# Patient Record
Sex: MALE | Race: WHITE | NOT HISPANIC OR LATINO | Employment: OTHER | URBAN - METROPOLITAN AREA
[De-identification: names, ages, dates, MRNs, and addresses within clinical notes are randomized per-mention and may not be internally consistent; named-entity substitution may affect disease eponyms.]

---

## 2017-04-21 ENCOUNTER — OFFICE VISIT (OUTPATIENT)
Dept: FAMILY MEDICINE | Facility: CLINIC | Age: 67
End: 2017-04-21
Payer: MEDICARE

## 2017-04-21 ENCOUNTER — HOSPITAL ENCOUNTER (OUTPATIENT)
Dept: RADIOLOGY | Facility: HOSPITAL | Age: 67
Discharge: HOME | End: 2017-04-21
Attending: INTERNAL MEDICINE
Payer: MEDICARE

## 2017-04-21 VITALS
BODY MASS INDEX: 34.82 KG/M2 | TEMPERATURE: 98 F | WEIGHT: 243.19 LBS | DIASTOLIC BLOOD PRESSURE: 70 MMHG | HEART RATE: 55 BPM | RESPIRATION RATE: 18 BRPM | OXYGEN SATURATION: 96 % | SYSTOLIC BLOOD PRESSURE: 130 MMHG | HEIGHT: 70 IN

## 2017-04-21 DIAGNOSIS — M25.552 HIP PAIN, BILATERAL: ICD-10-CM

## 2017-04-21 DIAGNOSIS — R73.9 HYPERGLYCEMIA: ICD-10-CM

## 2017-04-21 DIAGNOSIS — Z11.59 NEED FOR HEPATITIS C SCREENING TEST: ICD-10-CM

## 2017-04-21 DIAGNOSIS — I10 ESSENTIAL HYPERTENSION: ICD-10-CM

## 2017-04-21 DIAGNOSIS — E78.5 HYPERLIPIDEMIA, UNSPECIFIED HYPERLIPIDEMIA TYPE: Chronic | ICD-10-CM

## 2017-04-21 DIAGNOSIS — G25.81 RESTLESS LEG SYNDROME, CONTROLLED: ICD-10-CM

## 2017-04-21 DIAGNOSIS — M25.551 HIP PAIN, BILATERAL: ICD-10-CM

## 2017-04-21 DIAGNOSIS — J45.909 UNCOMPLICATED ASTHMA, UNSPECIFIED ASTHMA SEVERITY: Chronic | ICD-10-CM

## 2017-04-21 DIAGNOSIS — Z00.00 HEALTH CARE MAINTENANCE: ICD-10-CM

## 2017-04-21 PROBLEM — R45.1 RESTLESSNESS AND AGITATION: Status: RESOLVED | Noted: 2017-04-21 | Resolved: 2017-04-21

## 2017-04-21 PROBLEM — R45.1 RESTLESSNESS AND AGITATION: Status: ACTIVE | Noted: 2017-04-21

## 2017-04-21 PROCEDURE — 3078F DIAST BP <80 MM HG: CPT | Mod: S$GLB,,, | Performed by: INTERNAL MEDICINE

## 2017-04-21 PROCEDURE — 73521 X-RAY EXAM HIPS BI 2 VIEWS: CPT | Mod: 26,,, | Performed by: RADIOLOGY

## 2017-04-21 PROCEDURE — 99999 PR PBB SHADOW E&M-EST. PATIENT-LVL III: CPT | Mod: PBBFAC,,, | Performed by: INTERNAL MEDICINE

## 2017-04-21 PROCEDURE — 1160F RVW MEDS BY RX/DR IN RCRD: CPT | Mod: S$GLB,,, | Performed by: INTERNAL MEDICINE

## 2017-04-21 PROCEDURE — 73521 X-RAY EXAM HIPS BI 2 VIEWS: CPT | Mod: TC

## 2017-04-21 PROCEDURE — 3075F SYST BP GE 130 - 139MM HG: CPT | Mod: S$GLB,,, | Performed by: INTERNAL MEDICINE

## 2017-04-21 PROCEDURE — 99204 OFFICE O/P NEW MOD 45 MIN: CPT | Mod: S$GLB,,, | Performed by: INTERNAL MEDICINE

## 2017-04-21 RX ORDER — BISOPROLOL FUMARATE AND HYDROCHLOROTHIAZIDE 2.5; 6.25 MG/1; MG/1
1 TABLET ORAL EVERY MORNING
Qty: 90 TABLET | Refills: 1 | Status: SHIPPED | OUTPATIENT
Start: 2017-04-21 | End: 2018-01-29 | Stop reason: SDUPTHER

## 2017-04-21 RX ORDER — BUDESONIDE AND FORMOTEROL FUMARATE DIHYDRATE 160; 4.5 UG/1; UG/1
2 AEROSOL RESPIRATORY (INHALATION) EVERY 12 HOURS
Status: CANCELLED | OUTPATIENT
Start: 2017-04-21

## 2017-04-21 RX ORDER — GABAPENTIN 100 MG/1
100 CAPSULE ORAL 3 TIMES DAILY
COMMUNITY
End: 2017-04-21 | Stop reason: SDUPTHER

## 2017-04-21 RX ORDER — GABAPENTIN 100 MG/1
100 CAPSULE ORAL 3 TIMES DAILY
Qty: 90 CAPSULE | Refills: 1 | Status: SHIPPED | OUTPATIENT
Start: 2017-04-21 | End: 2017-07-20 | Stop reason: SDUPTHER

## 2017-04-21 RX ORDER — ALBUTEROL SULFATE 90 UG/1
2 AEROSOL, METERED RESPIRATORY (INHALATION) EVERY 6 HOURS PRN
Qty: 18 G | Refills: 0
Start: 2017-04-21 | End: 2018-01-18 | Stop reason: SDUPTHER

## 2017-04-21 RX ORDER — BUDESONIDE AND FORMOTEROL FUMARATE DIHYDRATE 160; 4.5 UG/1; UG/1
2 AEROSOL RESPIRATORY (INHALATION) EVERY 12 HOURS
Qty: 10.2 G | Refills: 3 | Status: SHIPPED | OUTPATIENT
Start: 2017-04-21 | End: 2018-07-08 | Stop reason: SDUPTHER

## 2017-04-21 RX ORDER — CITALOPRAM 40 MG/1
40 TABLET, FILM COATED ORAL EVERY MORNING
Qty: 90 TABLET | Refills: 1 | Status: SHIPPED | OUTPATIENT
Start: 2017-04-21 | End: 2018-01-29 | Stop reason: SDUPTHER

## 2017-04-21 NOTE — PROGRESS NOTES
SUBJECTIVE     No chief complaint on file.      HPI  Bonifacio Peoples Jr. is a 67 y.o. male with multiple medical diagnoses as listed in the medical history and problem list that presents for establish care. Pt is without any specific complaints today. He is fully compliant with his meds and denies any adverse side effects. 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     PAST MEDICAL HISTORY:  Past Medical History:   Diagnosis Date    Anticoagulant long-term use     Arthritis     Rt and Lt wrist    Asthma, mild intermittent, well-controlled     Depression     High cholesterol     Hypertension     Respiratory distress     Had to give a breathing treatment during last cysto procedure  2/10/12    Stroke     TIA  x3    TIA (transient ischemic attack)        PAST SURGICAL HISTORY:  Past Surgical History:   Procedure Laterality Date    CARDIAC SURGERY  2010, 2008    repair openings to heart    CYSTOSCOPY W/ LASER LITHOTRIPSY      2/2012 unsuccessful with stone removal. Ureteral stent placed    KNEE ARTHROSCOPY W/ LASER      TONSILLECTOMY      as a child       SOCIAL HISTORY:  Social History     Social History    Marital status:      Spouse name: N/A    Number of children: N/A    Years of education: N/A     Occupational History    Not on file.     Social History Main Topics    Smoking status: Never Smoker    Smokeless tobacco: Never Used    Alcohol use Yes      Comment: occassionally    Drug use: No    Sexual activity: Not on file     Other Topics Concern    Not on file     Social History Narrative       FAMILY HISTORY:  History reviewed. No pertinent family history.    ALLERGIES AND MEDICATIONS: updated and reviewed.  Review of patient's allergies indicates:   Allergen Reactions    Codeine      Childhood allergy, patient can't remember what happened.    Pcn [penicillins] Nausea And Vomiting    Iodine and iodide containing products Rash     IV dye contrast     Current  Outpatient Prescriptions   Medication Sig Dispense Refill    aspirin (ECOTRIN) 81 MG EC tablet Take 81 mg by mouth after dinner. .      bisoprolol-hydrochlorothiazide 2.5-6.25 mg (ZIAC) 2.5-6.25 mg Tab Take 1 tablet by mouth every morning. 90 tablet 1    budesonide-formoterol 160-4.5 mcg (SYMBICORT) 160-4.5 mcg/actuation HFAA Inhale 2 puffs into the lungs every 12 (twelve) hours. 10.2 g 3    citalopram (CELEXA) 40 MG tablet Take 1 tablet (40 mg total) by mouth every morning. Last dose 2-7-13 in preparation for procedure. 90 tablet 1    gabapentin (NEURONTIN) 100 MG capsule Take 1 capsule (100 mg total) by mouth 3 (three) times daily. 90 capsule 1    phenazopyridine (PYRIDIUM) 200 MG tablet Take 200 mg by mouth 3 (three) times daily. With breakfast, with supper & at bedtime.      albuterol 90 mcg/actuation inhaler Inhale 2 puffs into the lungs every 6 (six) hours as needed for Wheezing or Shortness of Breath. Rescue 18 g 0    pravastatin (PRAVACHOL) 40 MG tablet Take 40 mg by mouth after dinner.        No current facility-administered medications for this visit.        ROS  Review of Systems   Constitutional: Negative for chills and fever.   HENT: Negative for hearing loss and sore throat.    Eyes: Negative for visual disturbance.   Respiratory: Negative for cough and shortness of breath.    Cardiovascular: Negative for chest pain, palpitations and leg swelling.   Gastrointestinal: Negative for abdominal pain, constipation, diarrhea, nausea and vomiting.   Genitourinary: Negative for dysuria, frequency and urgency.   Musculoskeletal: Positive for arthralgias (B/L hip pain). Negative for joint swelling and myalgias.   Skin: Negative for rash.   Neurological: Negative for headaches.   Psychiatric/Behavioral: Negative for confusion.       OBJECTIVE     Physical Exam  Vitals:    04/21/17 1008   BP: 130/70   Pulse: (!) 55   Resp: 18   Temp: 97.5 °F (36.4 °C)    Body mass index is 34.89 kg/(m^2).  Weight: 110.3 kg  "(243 lb 2.7 oz)   Height: 5' 10" (177.8 cm)     Physical Exam   Constitutional: He is oriented to person, place, and time. He appears well-developed and well-nourished. No distress.   HENT:   Head: Normocephalic and atraumatic.   Right Ear: External ear normal.   Left Ear: External ear normal.   Nose: Nose normal.   Mouth/Throat: Oropharynx is clear and moist.   Eyes: Conjunctivae and EOM are normal. Right eye exhibits no discharge. Left eye exhibits no discharge. No scleral icterus.   Neck: Normal range of motion. Neck supple. No JVD present. No tracheal deviation present.   Cardiovascular: Normal rate, regular rhythm, normal heart sounds and intact distal pulses.  Exam reveals no gallop and no friction rub.    No murmur heard.  Pulmonary/Chest: Effort normal and breath sounds normal. No respiratory distress. He has no wheezes.   Abdominal: Soft. Bowel sounds are normal. He exhibits no distension and no mass. There is no tenderness. There is no rebound and no guarding.   Musculoskeletal: Normal range of motion. He exhibits no edema, tenderness or deformity.   Neurological: He is alert and oriented to person, place, and time. He exhibits normal muscle tone. Coordination normal.   Skin: Skin is warm and dry. No rash noted. No erythema.   Psychiatric: He has a normal mood and affect. His behavior is normal. Judgment and thought content normal.       Health Maintenance       Date Due Completion Date    Hepatitis C Screening 1950 ---    TETANUS VACCINE 4/1/1970 ---    Colonoscopy 4/1/2002 ---    Zoster Vaccine 4/1/2012 ---    Pneumococcal (65+) (1 of 2 - PCV13) 4/1/2017 ---    Lipid Panel 10/2/2019 10/2/2014            ASSESSMENT     67 y.o. male with     1. Mood disorder due to stroke    2. Essential hypertension    3. Restless leg syndrome, controlled    4. Uncomplicated asthma, unspecified asthma severity    5. Hyperlipidemia, unspecified hyperlipidemia type    6. Hip pain, bilateral    7. Need for hepatitis C " screening test    8. Health care maintenance    9. Hyperglycemia        PLAN:     1. Mood disorder due to stroke  - Stable; no acute issues  - The current medical regimen is effective;  continue present plan and medications.  - citalopram (CELEXA) 40 MG tablet; Take 1 tablet (40 mg total) by mouth every morning. Last dose 2-7-13 in preparation for procedure.  Dispense: 90 tablet; Refill: 1    2. Essential hypertension  - BP well controlled; at goal of <140/90  - The current medical regimen is effective;  continue present plan and medications.  - bisoprolol-hydrochlorothiazide 2.5-6.25 mg (ZIAC) 2.5-6.25 mg Tab; Take 1 tablet by mouth every morning.  Dispense: 90 tablet; Refill: 1  - CBC auto differential; Future  - Comprehensive metabolic panel; Future  - TSH; Future    3. Restless leg syndrome, controlled  - Stable; no acute issues  - The current medical regimen is effective;  continue present plan and medications.  - gabapentin (NEURONTIN) 100 MG capsule; Take 1 capsule (100 mg total) by mouth 3 (three) times daily.  Dispense: 90 capsule; Refill: 1    4. Uncomplicated asthma, unspecified asthma severity  - Stable; no acute issues  - The current medical regimen is effective;  continue present plan and medications.  - albuterol 90 mcg/actuation inhaler; Inhale 2 puffs into the lungs every 6 (six) hours as needed for Wheezing or Shortness of Breath. Rescue  Dispense: 18 g; Refill: 0  - budesonide-formoterol 160-4.5 mcg (SYMBICORT) 160-4.5 mcg/actuation HFAA; Inhale 2 puffs into the lungs every 12 (twelve) hours.  Dispense: 10.2 g; Refill: 3    5. Hyperlipidemia, unspecified hyperlipidemia type  - Lipid panel; Future    6. Hip pain, bilateral  - X-Ray Hips Bilateral 2 [employer] AP Pelvis; Future    7. Need for hepatitis C screening test  - Hepatitis C antibody; Future    8. Health care maintenance  - CBC auto differential; Future  - Comprehensive metabolic panel; Future  - TSH; Future  - Hemoglobin A1c; Future    9.  Hyperglycemia  - Hemoglobin A1c; Future        RTC in 3 months    Justine Win MD  04/22/2017 10:37 AM        No Follow-up on file.

## 2017-04-24 ENCOUNTER — LAB VISIT (OUTPATIENT)
Dept: LAB | Facility: HOSPITAL | Age: 67
End: 2017-04-24
Attending: INTERNAL MEDICINE
Payer: MEDICARE

## 2017-04-24 DIAGNOSIS — E78.5 HYPERLIPIDEMIA, UNSPECIFIED HYPERLIPIDEMIA TYPE: Chronic | ICD-10-CM

## 2017-04-24 DIAGNOSIS — R73.9 HYPERGLYCEMIA: ICD-10-CM

## 2017-04-24 DIAGNOSIS — Z00.00 HEALTH CARE MAINTENANCE: ICD-10-CM

## 2017-04-24 DIAGNOSIS — Z11.59 NEED FOR HEPATITIS C SCREENING TEST: ICD-10-CM

## 2017-04-24 DIAGNOSIS — I10 ESSENTIAL HYPERTENSION: ICD-10-CM

## 2017-04-24 LAB
ALBUMIN SERPL BCP-MCNC: 3.3 G/DL
ALP SERPL-CCNC: 75 U/L
ALT SERPL W/O P-5'-P-CCNC: 22 U/L
ANION GAP SERPL CALC-SCNC: 8 MMOL/L
AST SERPL-CCNC: 25 U/L
BASOPHILS # BLD AUTO: 0.03 K/UL
BASOPHILS NFR BLD: 0.4 %
BILIRUB SERPL-MCNC: 0.5 MG/DL
BUN SERPL-MCNC: 9 MG/DL
CALCIUM SERPL-MCNC: 8.6 MG/DL
CHLORIDE SERPL-SCNC: 106 MMOL/L
CHOLEST/HDLC SERPL: 8.1 {RATIO}
CO2 SERPL-SCNC: 27 MMOL/L
CREAT SERPL-MCNC: 1.1 MG/DL
DIFFERENTIAL METHOD: ABNORMAL
EOSINOPHIL # BLD AUTO: 0.2 K/UL
EOSINOPHIL NFR BLD: 2.1 %
ERYTHROCYTE [DISTWIDTH] IN BLOOD BY AUTOMATED COUNT: 12.8 %
EST. GFR  (AFRICAN AMERICAN): >60 ML/MIN/1.73 M^2
EST. GFR  (NON AFRICAN AMERICAN): >60 ML/MIN/1.73 M^2
GLUCOSE SERPL-MCNC: 146 MG/DL
HCT VFR BLD AUTO: 47.7 %
HDL/CHOLESTEROL RATIO: 12.4 %
HDLC SERPL-MCNC: 137 MG/DL
HDLC SERPL-MCNC: 17 MG/DL
HGB BLD-MCNC: 16.5 G/DL
LDLC SERPL CALC-MCNC: 100.2 MG/DL
LYMPHOCYTES # BLD AUTO: 1.9 K/UL
LYMPHOCYTES NFR BLD: 22.8 %
MCH RBC QN AUTO: 30 PG
MCHC RBC AUTO-ENTMCNC: 34.6 %
MCV RBC AUTO: 87 FL
MONOCYTES # BLD AUTO: 1.1 K/UL
MONOCYTES NFR BLD: 12.7 %
NEUTROPHILS # BLD AUTO: 5.2 K/UL
NEUTROPHILS NFR BLD: 61.6 %
NONHDLC SERPL-MCNC: 120 MG/DL
PLATELET # BLD AUTO: 183 K/UL
PMV BLD AUTO: 10.7 FL
POTASSIUM SERPL-SCNC: 4.1 MMOL/L
PROT SERPL-MCNC: 6.5 G/DL
RBC # BLD AUTO: 5.5 M/UL
SODIUM SERPL-SCNC: 141 MMOL/L
TRIGL SERPL-MCNC: 99 MG/DL
TSH SERPL DL<=0.005 MIU/L-ACNC: 0.98 UIU/ML
WBC # BLD AUTO: 8.45 K/UL

## 2017-04-24 PROCEDURE — 80053 COMPREHEN METABOLIC PANEL: CPT

## 2017-04-24 PROCEDURE — 83036 HEMOGLOBIN GLYCOSYLATED A1C: CPT

## 2017-04-24 PROCEDURE — 86803 HEPATITIS C AB TEST: CPT

## 2017-04-24 PROCEDURE — 84443 ASSAY THYROID STIM HORMONE: CPT

## 2017-04-24 PROCEDURE — 36415 COLL VENOUS BLD VENIPUNCTURE: CPT

## 2017-04-24 PROCEDURE — 85025 COMPLETE CBC W/AUTO DIFF WBC: CPT

## 2017-04-24 PROCEDURE — 80061 LIPID PANEL: CPT

## 2017-04-25 LAB
ESTIMATED AVG GLUCOSE: 160 MG/DL
HBA1C MFR BLD HPLC: 7.2 %
HCV AB SERPL QL IA: NEGATIVE

## 2017-04-26 ENCOUNTER — TELEPHONE (OUTPATIENT)
Dept: FAMILY MEDICINE | Facility: CLINIC | Age: 67
End: 2017-04-26

## 2017-04-26 NOTE — TELEPHONE ENCOUNTER
Called pt's wife and instructed her to call and make an appt for pt to discuss lab results. She will call for an appt next week.

## 2017-05-03 ENCOUNTER — OFFICE VISIT (OUTPATIENT)
Dept: FAMILY MEDICINE | Facility: CLINIC | Age: 67
End: 2017-05-03
Payer: MEDICARE

## 2017-05-03 VITALS
SYSTOLIC BLOOD PRESSURE: 120 MMHG | WEIGHT: 239.44 LBS | HEART RATE: 57 BPM | BODY MASS INDEX: 34.28 KG/M2 | TEMPERATURE: 98 F | DIASTOLIC BLOOD PRESSURE: 78 MMHG | HEIGHT: 70 IN | RESPIRATION RATE: 96 BRPM

## 2017-05-03 DIAGNOSIS — E11.9 TYPE 2 DIABETES MELLITUS WITHOUT COMPLICATION, WITHOUT LONG-TERM CURRENT USE OF INSULIN: Primary | ICD-10-CM

## 2017-05-03 DIAGNOSIS — Z23 NEED FOR PNEUMOCOCCAL VACCINATION: ICD-10-CM

## 2017-05-03 DIAGNOSIS — I10 ESSENTIAL HYPERTENSION: ICD-10-CM

## 2017-05-03 DIAGNOSIS — M16.0 BILATERAL HIP JOINT ARTHRITIS: ICD-10-CM

## 2017-05-03 PROCEDURE — 4010F ACE/ARB THERAPY RXD/TAKEN: CPT | Mod: S$GLB,,, | Performed by: INTERNAL MEDICINE

## 2017-05-03 PROCEDURE — 99214 OFFICE O/P EST MOD 30 MIN: CPT | Mod: S$GLB,,, | Performed by: INTERNAL MEDICINE

## 2017-05-03 PROCEDURE — 3078F DIAST BP <80 MM HG: CPT | Mod: S$GLB,,, | Performed by: INTERNAL MEDICINE

## 2017-05-03 PROCEDURE — 99999 PR PBB SHADOW E&M-EST. PATIENT-LVL III: CPT | Mod: PBBFAC,,, | Performed by: INTERNAL MEDICINE

## 2017-05-03 PROCEDURE — G0009 ADMIN PNEUMOCOCCAL VACCINE: HCPCS | Mod: S$GLB,,, | Performed by: INTERNAL MEDICINE

## 2017-05-03 PROCEDURE — 90732 PPSV23 VACC 2 YRS+ SUBQ/IM: CPT | Mod: S$GLB,,, | Performed by: INTERNAL MEDICINE

## 2017-05-03 PROCEDURE — 3074F SYST BP LT 130 MM HG: CPT | Mod: S$GLB,,, | Performed by: INTERNAL MEDICINE

## 2017-05-03 PROCEDURE — 3045F PR MOST RECENT HEMOGLOBIN A1C LEVEL 7.0-9.0%: CPT | Mod: S$GLB,,, | Performed by: INTERNAL MEDICINE

## 2017-05-03 PROCEDURE — 82570 ASSAY OF URINE CREATININE: CPT

## 2017-05-03 PROCEDURE — 1160F RVW MEDS BY RX/DR IN RCRD: CPT | Mod: S$GLB,,, | Performed by: INTERNAL MEDICINE

## 2017-05-03 RX ORDER — METFORMIN HYDROCHLORIDE 500 MG/1
TABLET, EXTENDED RELEASE ORAL
Qty: 60 TABLET | Refills: 3 | Status: SHIPPED | OUTPATIENT
Start: 2017-05-03 | End: 2018-02-10 | Stop reason: SDUPTHER

## 2017-05-03 RX ORDER — INSULIN PUMP SYRINGE, 3 ML
EACH MISCELLANEOUS
Qty: 1 EACH | Refills: 0 | Status: SHIPPED | OUTPATIENT
Start: 2017-05-03 | End: 2017-05-11 | Stop reason: SDUPTHER

## 2017-05-03 RX ORDER — LISINOPRIL 2.5 MG/1
2.5 TABLET ORAL DAILY
Qty: 90 TABLET | Refills: 3 | Status: SHIPPED | OUTPATIENT
Start: 2017-05-03 | End: 2018-04-18 | Stop reason: SDUPTHER

## 2017-05-03 RX ORDER — LANCETS
1 EACH MISCELLANEOUS DAILY
Qty: 100 EACH | Refills: 3 | Status: SHIPPED | OUTPATIENT
Start: 2017-05-03 | End: 2017-05-11 | Stop reason: SDUPTHER

## 2017-05-03 NOTE — PROGRESS NOTES
SUBJECTIVE     Chief Complaint   Patient presents with    Follow-up    Results     lab results       HPI  Bonifacio Peoples Jr. is a 67 y.o. male with multiple medical diagnoses as listed in the medical history and problem list that presents for lab results. Pt is without complaints today, but needs to get lab results. He reports feeling fine other than some occasional B/L hip pain. Reports full compliance with meds and denies adverse side effects.    PAST MEDICAL HISTORY:  Past Medical History:   Diagnosis Date    Anticoagulant long-term use     Arthritis     Rt and Lt wrist    Asthma, mild intermittent, well-controlled     Depression     High cholesterol     Hypertension     Respiratory distress     Had to give a breathing treatment during last cysto procedure  2/10/12    Stroke     TIA  x3    TIA (transient ischemic attack)        PAST SURGICAL HISTORY:  Past Surgical History:   Procedure Laterality Date    CARDIAC SURGERY  2010, 2008    repair openings to heart    CYSTOSCOPY W/ LASER LITHOTRIPSY      2/2012 unsuccessful with stone removal. Ureteral stent placed    KNEE ARTHROSCOPY W/ LASER      TONSILLECTOMY      as a child       SOCIAL HISTORY:  Social History     Social History    Marital status:      Spouse name: N/A    Number of children: N/A    Years of education: N/A     Occupational History    Not on file.     Social History Main Topics    Smoking status: Never Smoker    Smokeless tobacco: Never Used    Alcohol use Yes      Comment: occassionally    Drug use: No    Sexual activity: Not on file     Other Topics Concern    Not on file     Social History Narrative       FAMILY HISTORY:  History reviewed. No pertinent family history.    ALLERGIES AND MEDICATIONS: updated and reviewed.  Review of patient's allergies indicates:   Allergen Reactions    Codeine      Childhood allergy, patient can't remember what happened.    Pcn [penicillins] Nausea And Vomiting    Iodine and  iodide containing products Rash     IV dye contrast     Current Outpatient Prescriptions   Medication Sig Dispense Refill    albuterol 90 mcg/actuation inhaler Inhale 2 puffs into the lungs every 6 (six) hours as needed for Wheezing or Shortness of Breath. Rescue 18 g 0    aspirin (ECOTRIN) 81 MG EC tablet Take 81 mg by mouth after dinner. .      bisoprolol-hydrochlorothiazide 2.5-6.25 mg (ZIAC) 2.5-6.25 mg Tab Take 1 tablet by mouth every morning. 90 tablet 1    budesonide-formoterol 160-4.5 mcg (SYMBICORT) 160-4.5 mcg/actuation HFAA Inhale 2 puffs into the lungs every 12 (twelve) hours. 10.2 g 3    citalopram (CELEXA) 40 MG tablet Take 1 tablet (40 mg total) by mouth every morning. Last dose 2-7-13 in preparation for procedure. 90 tablet 1    gabapentin (NEURONTIN) 100 MG capsule Take 1 capsule (100 mg total) by mouth 3 (three) times daily. 90 capsule 1    phenazopyridine (PYRIDIUM) 200 MG tablet Take 200 mg by mouth 3 (three) times daily. With breakfast, with supper & at bedtime.      pravastatin (PRAVACHOL) 40 MG tablet Take 40 mg by mouth after dinner.       blood sugar diagnostic Strp 100 strips by Misc.(Non-Drug; Combo Route) route once daily. 100 strip 3    blood-glucose meter kit Use as instructed 1 each 0    lancets (ACCU-CHEK SOFTCLIX LANCETS) Misc 1 Box by Misc.(Non-Drug; Combo Route) route once daily. 100 each 3    lisinopril (PRINIVIL,ZESTRIL) 2.5 MG tablet Take 1 tablet (2.5 mg total) by mouth once daily. 90 tablet 3    metformin (GLUCOPHAGE-XR) 500 MG 24 hr tablet Take 1 tablet by mouth daily with breakfast for 1 week, then take 1 tablet by mouth daily with breakfast and dinner 60 tablet 3     No current facility-administered medications for this visit.        ROS  Review of Systems   Constitutional: Negative for chills and fever.   HENT: Negative for hearing loss and sore throat.    Eyes: Negative for visual disturbance.   Respiratory: Negative for cough and shortness of breath.   "  Cardiovascular: Negative for chest pain, palpitations and leg swelling.   Gastrointestinal: Negative for abdominal pain, constipation, diarrhea, nausea and vomiting.   Genitourinary: Negative for dysuria, frequency and urgency.   Musculoskeletal: Positive for arthralgias (B/L hip pain). Negative for joint swelling and myalgias.   Skin: Negative for rash and wound.   Neurological: Negative for headaches.   Psychiatric/Behavioral: Negative for agitation and confusion. The patient is not nervous/anxious.          OBJECTIVE     Physical Exam  Vitals:    05/03/17 1035   BP: 120/78   Pulse: (!) 57   Resp: (!) 96   Temp: 98.2 °F (36.8 °C)    Body mass index is 34.35 kg/(m^2).  Weight: 108.6 kg (239 lb 6.7 oz)   Height: 5' 10" (177.8 cm)     Physical Exam   Constitutional: He is oriented to person, place, and time. He appears well-developed and well-nourished. No distress.   HENT:   Head: Normocephalic and atraumatic.   Right Ear: External ear normal.   Left Ear: External ear normal.   Nose: Nose normal.   Mouth/Throat: Oropharynx is clear and moist.   Eyes: Conjunctivae and EOM are normal. Right eye exhibits no discharge. Left eye exhibits no discharge. No scleral icterus.   Neck: Normal range of motion. Neck supple. No JVD present. No tracheal deviation present.   Cardiovascular: Normal rate, regular rhythm, normal heart sounds and intact distal pulses.  Exam reveals no gallop and no friction rub.    No murmur heard.  Pulmonary/Chest: Effort normal and breath sounds normal. No respiratory distress. He has no wheezes.   Abdominal: Soft. Bowel sounds are normal. He exhibits no distension and no mass. There is no tenderness. There is no rebound and no guarding.   Musculoskeletal: Normal range of motion. He exhibits no edema, tenderness or deformity.        Right foot: There is normal range of motion.        Left foot: There is normal range of motion.   Feet:   Right Foot:   Protective Sensation: 10 sites tested. 10 sites " sensed.   Skin Integrity: Positive for warmth, callus and dry skin. Negative for skin breakdown.   Left Foot:   Protective Sensation: 10 sites tested. 10 sites sensed.   Skin Integrity: Positive for warmth, callus and dry skin. Negative for skin breakdown.   Neurological: He is alert and oriented to person, place, and time. He exhibits normal muscle tone. Coordination normal.   Skin: Skin is warm and dry. No rash noted. No erythema.   Psychiatric: He has a normal mood and affect. His behavior is normal. Judgment and thought content normal.         Health Maintenance       Date Due Completion Date    Eye Exam 4/1/1962 ---    TETANUS VACCINE 4/1/1970 ---    Colonoscopy 4/1/2002 ---    Zoster Vaccine 4/1/2012 ---    Pneumococcal (65+) (1 of 2 - PCV13) 4/1/2017 ---    Influenza Vaccine 8/1/2017 4/21/2017 (Declined)    Override on 4/21/2017: Declined    Hemoglobin A1c 10/24/2017 4/24/2017    Lipid Panel 4/24/2018 4/24/2017    Foot Exam 5/3/2018 5/3/2017 (Done)    Override on 5/3/2017: Done (See progress note for further details)            ASSESSMENT     67 y.o. male with     1. Type 2 diabetes mellitus without complication, without long-term current use of insulin    2. Bilateral hip joint arthritis    3. Essential hypertension    4. Need for pneumococcal vaccination        PLAN:     1. Type 2 diabetes mellitus without complication, without long-term current use of insulin  - Pt to keep log to present to next visit; ADA diet and advised to check feet daily  - metformin (GLUCOPHAGE-XR) 500 MG 24 hr tablet; Take 1 tablet by mouth daily with breakfast for 1 week, then take 1 tablet by mouth daily with breakfast and dinner  Dispense: 60 tablet; Refill: 3  - MICROALBUMIN / CREATININE RATIO URINE; Future  - Diabetic Eye Screening Photo; Future  - lisinopril (PRINIVIL,ZESTRIL) 2.5 MG tablet; Take 1 tablet (2.5 mg total) by mouth once daily.  Dispense: 90 tablet; Refill: 3  - blood-glucose meter kit; Use as instructed   Dispense: 1 each; Refill: 0  - blood sugar diagnostic Strp; 100 strips by Misc.(Non-Drug; Combo Route) route once daily.  Dispense: 100 strip; Refill: 3  - lancets (ACCU-CHEK SOFTCLIX LANCETS) Misc; 1 Box by Misc.(Non-Drug; Combo Route) route once daily.  Dispense: 100 each; Refill: 3    2. Bilateral hip joint arthritis  - Stable; no acute issues  - Take Tylenol prn pain    3. Essential hypertension  - BP well controlled; at goal of <140/90  - The current medical regimen is effective;  continue present plan and medications.  - lisinopril (PRINIVIL,ZESTRIL) 2.5 MG tablet; Take 1 tablet (2.5 mg total) by mouth once daily.  Dispense: 90 tablet; Refill: 3    4. Need for pneumococcal vaccination  - Pneumococcal Polysaccharide Vaccine (23 Valent) (SQ/IM)        RTC in 3 months     Justine Win MD  05/03/2017 11:18 AM        No Follow-up on file.

## 2017-05-04 LAB
CREAT UR-MCNC: 173 MG/DL
MICROALBUMIN UR DL<=1MG/L-MCNC: 20 UG/ML
MICROALBUMIN/CREATININE RATIO: 11.6 UG/MG

## 2017-05-05 ENCOUNTER — TELEPHONE (OUTPATIENT)
Dept: FAMILY MEDICINE | Facility: CLINIC | Age: 67
End: 2017-05-05

## 2017-05-05 NOTE — TELEPHONE ENCOUNTER
Patient's wife states that he was given lisinopril yesterday, but he can't remember the reason he has to take another blood pressure medication.  Please advise.

## 2017-05-05 NOTE — TELEPHONE ENCOUNTER
----- Message from Taylor Hidalgo sent at 5/5/2017 10:12 AM CDT -----  Patient's wife is calling regarding a blood pressure medication he was given. He did not remember why he needed an additional one. Please call at [phone] Thank you!

## 2017-05-05 NOTE — TELEPHONE ENCOUNTER
Pt's wife called asking why he was started on a 2nd HTN med. I explained that the Lisinopril is there for renal protection. She remembered me stating that during the visit and voiced understanding.

## 2017-05-11 DIAGNOSIS — E11.9 TYPE 2 DIABETES MELLITUS WITHOUT COMPLICATION, WITHOUT LONG-TERM CURRENT USE OF INSULIN: ICD-10-CM

## 2017-05-11 NOTE — TELEPHONE ENCOUNTER
----- Message from Sandy DEE Campbell sent at 5/11/2017  4:27 PM CDT -----  Contact: self   Pt said that [facility] never received his medication for diabetic. Please contact [phone] or [phone]. Thanks!

## 2017-05-11 NOTE — TELEPHONE ENCOUNTER
Patient's glucometer was sent to [facility], he need it to go to [facility].

## 2017-05-12 RX ORDER — INSULIN PUMP SYRINGE, 3 ML
EACH MISCELLANEOUS
Qty: 1 EACH | Refills: 0 | Status: SHIPPED | OUTPATIENT
Start: 2017-05-12 | End: 2018-05-11

## 2017-05-12 RX ORDER — LANCETS
1 EACH MISCELLANEOUS DAILY
Qty: 100 EACH | Refills: 3 | Status: SHIPPED | OUTPATIENT
Start: 2017-05-12 | End: 2019-01-01 | Stop reason: SDUPTHER

## 2017-07-20 DIAGNOSIS — G25.81 RESTLESS LEG SYNDROME, CONTROLLED: ICD-10-CM

## 2017-07-20 RX ORDER — GABAPENTIN 100 MG/1
CAPSULE ORAL
Qty: 180 CAPSULE | Refills: 1 | Status: SHIPPED | OUTPATIENT
Start: 2017-07-20 | End: 2019-01-01

## 2017-08-02 ENCOUNTER — LAB VISIT (OUTPATIENT)
Dept: LAB | Facility: HOSPITAL | Age: 67
End: 2017-08-02
Attending: INTERNAL MEDICINE
Payer: MEDICARE

## 2017-08-02 ENCOUNTER — OFFICE VISIT (OUTPATIENT)
Dept: FAMILY MEDICINE | Facility: CLINIC | Age: 67
End: 2017-08-02
Payer: MEDICARE

## 2017-08-02 VITALS
TEMPERATURE: 98 F | DIASTOLIC BLOOD PRESSURE: 70 MMHG | SYSTOLIC BLOOD PRESSURE: 104 MMHG | BODY MASS INDEX: 32.16 KG/M2 | HEART RATE: 60 BPM | WEIGHT: 224.63 LBS | HEIGHT: 70 IN | OXYGEN SATURATION: 96 %

## 2017-08-02 DIAGNOSIS — E11.9 TYPE 2 DIABETES MELLITUS WITHOUT COMPLICATION, WITHOUT LONG-TERM CURRENT USE OF INSULIN: Primary | ICD-10-CM

## 2017-08-02 DIAGNOSIS — K21.9 GASTROESOPHAGEAL REFLUX DISEASE, ESOPHAGITIS PRESENCE NOT SPECIFIED: ICD-10-CM

## 2017-08-02 DIAGNOSIS — E11.9 TYPE 2 DIABETES MELLITUS WITHOUT COMPLICATION, WITHOUT LONG-TERM CURRENT USE OF INSULIN: ICD-10-CM

## 2017-08-02 DIAGNOSIS — I10 ESSENTIAL HYPERTENSION: ICD-10-CM

## 2017-08-02 PROCEDURE — 36415 COLL VENOUS BLD VENIPUNCTURE: CPT

## 2017-08-02 PROCEDURE — 99499 UNLISTED E&M SERVICE: CPT | Mod: S$GLB,,, | Performed by: INTERNAL MEDICINE

## 2017-08-02 PROCEDURE — 99214 OFFICE O/P EST MOD 30 MIN: CPT | Mod: S$GLB,,, | Performed by: INTERNAL MEDICINE

## 2017-08-02 PROCEDURE — 83036 HEMOGLOBIN GLYCOSYLATED A1C: CPT

## 2017-08-02 PROCEDURE — 3045F PR MOST RECENT HEMOGLOBIN A1C LEVEL 7.0-9.0%: CPT | Mod: S$GLB,,, | Performed by: INTERNAL MEDICINE

## 2017-08-02 PROCEDURE — 4010F ACE/ARB THERAPY RXD/TAKEN: CPT | Mod: S$GLB,,, | Performed by: INTERNAL MEDICINE

## 2017-08-02 PROCEDURE — 99999 PR PBB SHADOW E&M-EST. PATIENT-LVL III: CPT | Mod: PBBFAC,,, | Performed by: INTERNAL MEDICINE

## 2017-08-02 NOTE — PATIENT INSTRUCTIONS
Tips to Control Acid Reflux    To control acid reflux, youll need to make some basic diet and lifestyle changes. The simple steps outlined below may be all youll need to ease discomfort.  Watch what you eat  · Avoid fatty foods and spicy foods.  · Eat fewer acidic foods, such as citrus and tomato-based foods. These can increase symptoms.  · Limit drinking alcohol, caffeine, and fizzy beverages. All increase acid reflux.  · Try limiting chocolate, peppermint, and spearmint. These can worsen acid reflux in some people.  Watch when you eat  · Avoid lying down for 3 hours after eating.  · Do not snack before going to bed.  Raise your head  Raising your head and upper body by 4 to 6 inches helps limit reflux when youre lying down. Put blocks under the head of your bed frame to raise it.  Other changes  · Lose weight, if you need to  · Dont exercise near bedtime  · Avoid tight-fitting clothes  · Limit aspirin and ibuprofen  · Stop smoking   Date Last Reviewed: 7/1/2016  © 6467-7092 [employer]. [address] [place] [address], [place] [address]. All rights reserved. This information is not intended as a substitute for professional medical care. Always follow your healthcare professional's instructions.

## 2017-08-02 NOTE — PROGRESS NOTES
SUBJECTIVE     Chief Complaint   Patient presents with    Follow-up    Other     pain/ cramp in middle of stomach under rib cage.       HPI  Bonifacio Peoples Jr. is a 67 y.o. male with multiple medical diagnoses as listed in the medical history and problem list that presents for follow-up for DM2. Pt has been taking his meds and denies any adverse side effects. He has changed his diet tremendously and even lost weight. Pt checks his fasting blood glucose levels with ranges from .     Abdominal pain- x 2 weeks. Pt reports having a burning sensation intermittently. He has not found the discomfort to be related to any foods. He did have this problem ~1 month ago. He did take Tums ~2 weeks ago and pain resolved.    PAST MEDICAL HISTORY:  Past Medical History:   Diagnosis Date    Anticoagulant long-term use     Arthritis     Rt and Lt wrist    Asthma, mild intermittent, well-controlled     Depression     High cholesterol     Hypertension     Respiratory distress     Had to give a breathing treatment during last cysto procedure  2/10/12    Stroke     TIA  x3    TIA (transient ischemic attack)        PAST SURGICAL HISTORY:  Past Surgical History:   Procedure Laterality Date    CARDIAC SURGERY  2010, 2008    repair openings to heart    CYSTOSCOPY W/ LASER LITHOTRIPSY      2/2012 unsuccessful with stone removal. Ureteral stent placed    KNEE ARTHROSCOPY W/ LASER      TONSILLECTOMY      as a child       SOCIAL HISTORY:  Social History     Social History    Marital status:      Spouse name: N/A    Number of children: N/A    Years of education: N/A     Occupational History    Not on file.     Social History Main Topics    Smoking status: Never Smoker    Smokeless tobacco: Never Used    Alcohol use Yes      Comment: occassionally    Drug use: No    Sexual activity: Not on file     Other Topics Concern    Not on file     Social History Narrative    No narrative on file       FAMILY  HISTORY:  History reviewed. No pertinent family history.    ALLERGIES AND MEDICATIONS: updated and reviewed.  Review of patient's allergies indicates:   Allergen Reactions    Codeine      Childhood allergy, patient can't remember what happened.    Pcn [penicillins] Nausea And Vomiting    Iodine and iodide containing products Rash     IV dye contrast     Current Outpatient Prescriptions   Medication Sig Dispense Refill    albuterol 90 mcg/actuation inhaler Inhale 2 puffs into the lungs every 6 (six) hours as needed for Wheezing or Shortness of Breath. Rescue 18 g 0    aspirin (ECOTRIN) 81 MG EC tablet Take 81 mg by mouth after dinner. .      bisoprolol-hydrochlorothiazide 2.5-6.25 mg (ZIAC) 2.5-6.25 mg Tab Take 1 tablet by mouth every morning. 90 tablet 1    blood sugar diagnostic Strp 100 strips by Misc.(Non-Drug; Combo Route) route once daily. 100 strip 3    blood-glucose meter kit Use as instructed 1 each 0    budesonide-formoterol 160-4.5 mcg (SYMBICORT) 160-4.5 mcg/actuation HFAA Inhale 2 puffs into the lungs every 12 (twelve) hours. 10.2 g 3    citalopram (CELEXA) 40 MG tablet Take 1 tablet (40 mg total) by mouth every morning. Last dose 2-7-13 in preparation for procedure. 90 tablet 1    gabapentin (NEURONTIN) 100 MG capsule TAKE 1 CAPSULE THREE TIMES DAILY 180 capsule 1    lancets (ACCU-CHEK SOFTCLIX LANCETS) Misc 1 Box by Misc.(Non-Drug; Combo Route) route once daily. 100 each 3    lisinopril (PRINIVIL,ZESTRIL) 2.5 MG tablet Take 1 tablet (2.5 mg total) by mouth once daily. 90 tablet 3    metformin (GLUCOPHAGE-XR) 500 MG 24 hr tablet Take 1 tablet by mouth daily with breakfast for 1 week, then take 1 tablet by mouth daily with breakfast and dinner 60 tablet 3    phenazopyridine (PYRIDIUM) 200 MG tablet Take 200 mg by mouth 3 (three) times daily. With breakfast, with supper & at bedtime.      pravastatin (PRAVACHOL) 40 MG tablet Take 40 mg by mouth after dinner.        No current  "facility-administered medications for this visit.        ROS  Review of Systems   Constitutional: Negative for chills and fever.   HENT: Negative for hearing loss and sore throat.    Eyes: Negative for visual disturbance.   Respiratory: Negative for cough and shortness of breath.    Cardiovascular: Negative for chest pain, palpitations and leg swelling.   Gastrointestinal: Positive for abdominal pain. Negative for constipation, diarrhea, nausea and vomiting.   Genitourinary: Negative for dysuria, frequency and urgency.   Musculoskeletal: Negative for arthralgias, joint swelling and myalgias.   Skin: Negative for rash and wound.   Neurological: Negative for headaches.   Psychiatric/Behavioral: Negative for agitation and confusion. The patient is not nervous/anxious.          OBJECTIVE     Physical Exam  Vitals:    08/02/17 1100   BP: 104/70   Pulse: 60   Temp: 98.4 °F (36.9 °C)    Body mass index is 32.23 kg/m².  Weight: 101.9 kg (224 lb 10.4 oz)   Height: 5' 10" (177.8 cm)     Physical Exam   Constitutional: He is oriented to person, place, and time. He appears well-developed and well-nourished. No distress.   HENT:   Head: Normocephalic and atraumatic.   Right Ear: External ear normal.   Left Ear: External ear normal.   Nose: Nose normal.   Mouth/Throat: Oropharynx is clear and moist.   Eyes: Conjunctivae and EOM are normal. Right eye exhibits no discharge. Left eye exhibits no discharge. No scleral icterus.   Neck: Normal range of motion. Neck supple. No JVD present. No tracheal deviation present.   Cardiovascular: Normal rate, regular rhythm, normal heart sounds and intact distal pulses.  Exam reveals no gallop and no friction rub.    No murmur heard.  Pulmonary/Chest: Effort normal and breath sounds normal. No respiratory distress. He has no wheezes.   Abdominal: Soft. Bowel sounds are normal. He exhibits no distension and no mass. There is no tenderness. There is no rebound and no guarding. No hernia. "   Musculoskeletal: Normal range of motion. He exhibits no edema, tenderness or deformity.   Neurological: He is alert and oriented to person, place, and time. He exhibits normal muscle tone. Coordination normal.   Skin: Skin is warm and dry. No rash noted. No erythema.   Psychiatric: He has a normal mood and affect. His behavior is normal. Judgment and thought content normal.         Health Maintenance       Date Due Completion Date    Eye Exam 04/01/1962 ---    TETANUS VACCINE 04/01/1970 ---    Colonoscopy 04/01/2002 ---    Zoster Vaccine 04/01/2012 ---    Influenza Vaccine 10/01/2017 (Originally 8/1/2017) 4/21/2017 (Declined)    Override on 4/21/2017: Declined    Hemoglobin A1c 10/24/2017 4/24/2017    Lipid Panel 04/24/2018 4/24/2017    Pneumococcal (65+) (2 of 2 - PCV13) 05/03/2018 5/3/2017    Foot Exam 05/03/2018 5/3/2017    Override on 5/3/2017: Done (See progress note for further details)            ASSESSMENT     67 y.o. male with     1. Type 2 diabetes mellitus without complication, without long-term current use of insulin    2. Essential hypertension    3. Gastroesophageal reflux disease, esophagitis presence not specified        PLAN:     1. Type 2 diabetes mellitus without complication, without long-term current use of insulin  - Stable; no acute issues  - The current medical regimen is effective;  continue present plan and medications.  - Hemoglobin A1c; Future  - Diabetic Eye Screening Photo; Future    2. Essential hypertension  - Stable  - BP well controlled; at goal of <140/90  - The current medical regimen is effective;  continue present plan and medications.    3. Gastroesophageal reflux disease, esophagitis presence not specified  - Stable; no acute issues  - Pt okay to continue Tums prn, but pt to call back for Rx meds if having to take Tums frequently         RTC in 6 months     Justine Win MD  08/02/2017 11:11 AM        No Follow-up on file.

## 2017-08-03 LAB
ESTIMATED AVG GLUCOSE: 117 MG/DL
HBA1C MFR BLD HPLC: 5.7 %

## 2017-08-04 ENCOUNTER — CLINICAL SUPPORT (OUTPATIENT)
Dept: OPHTHALMOLOGY | Facility: CLINIC | Age: 67
End: 2017-08-04
Payer: MEDICARE

## 2017-08-04 DIAGNOSIS — E11.9 TYPE 2 DIABETES MELLITUS WITHOUT COMPLICATION, WITHOUT LONG-TERM CURRENT USE OF INSULIN: ICD-10-CM

## 2017-08-04 PROCEDURE — 99999 PR PBB SHADOW E&M-EST. PATIENT-LVL II: CPT | Mod: PBBFAC,,,

## 2017-08-04 PROCEDURE — 92250 FUNDUS PHOTOGRAPHY W/I&R: CPT | Mod: S$GLB,,, | Performed by: OPHTHALMOLOGY

## 2017-08-04 NOTE — Clinical Note
66 y/o here for screening for diabetic retinopathy with non-dilated fundus photos per Dr. Justine Win. -- Dx: type 2 diabetes mellitus w/o complication w/o long-term use of insulin. Pt has small pupils and tolerated test well. Pt wears bifocals, no contact lenses. Pt denies eye pain, eye surgeries, glaucoma. Pt stated that was dx with cataracts (diagnosing MD unknown).

## 2017-08-07 ENCOUNTER — TELEPHONE (OUTPATIENT)
Dept: OPHTHALMOLOGY | Facility: CLINIC | Age: 67
End: 2017-08-07

## 2017-08-07 NOTE — PROGRESS NOTES
HPI     66 y/o here for screening for diabetic retinopathy with non-dilated   fundus photos per Dr. Justine Win.  --  Dx: type 2 diabetes mellitus w/o complication w/o long-term use of   insulin.  Pt has small pupils and tolerated test well.  Pt wears bifocals, no contact lenses.  Pt denies eye pain, eye surgeries, glaucoma.  Pt stated that was dx with cataracts (diagnosing MD unknown).    Last edited by Lluvia Govea on 8/4/2017  2:06 PM. (History)            Assessment /Plan     For exam results, see Encounter Report.    Type 2 diabetes mellitus without complication, without long-term current use of insulin  -     Diabetic Eye Screening Photo      Please see Dr. Mackay's progress notes for interpretation.

## 2017-08-09 ENCOUNTER — TELEPHONE (OUTPATIENT)
Dept: OPHTHALMOLOGY | Facility: CLINIC | Age: 67
End: 2017-08-09

## 2017-09-22 ENCOUNTER — OFFICE VISIT (OUTPATIENT)
Dept: FAMILY MEDICINE | Facility: CLINIC | Age: 67
End: 2017-09-22
Payer: MEDICARE

## 2017-09-22 VITALS
SYSTOLIC BLOOD PRESSURE: 116 MMHG | TEMPERATURE: 98 F | WEIGHT: 220.44 LBS | HEART RATE: 60 BPM | RESPIRATION RATE: 16 BRPM | OXYGEN SATURATION: 96 % | HEIGHT: 71 IN | DIASTOLIC BLOOD PRESSURE: 70 MMHG | BODY MASS INDEX: 30.86 KG/M2

## 2017-09-22 DIAGNOSIS — J41.1 MUCOPURULENT CHRONIC BRONCHITIS: Primary | ICD-10-CM

## 2017-09-22 PROCEDURE — 3074F SYST BP LT 130 MM HG: CPT | Mod: S$GLB,,, | Performed by: INTERNAL MEDICINE

## 2017-09-22 PROCEDURE — 99999 PR PBB SHADOW E&M-EST. PATIENT-LVL III: CPT | Mod: PBBFAC,,, | Performed by: INTERNAL MEDICINE

## 2017-09-22 PROCEDURE — 3008F BODY MASS INDEX DOCD: CPT | Mod: S$GLB,,, | Performed by: INTERNAL MEDICINE

## 2017-09-22 PROCEDURE — 99499 UNLISTED E&M SERVICE: CPT | Mod: S$GLB,,, | Performed by: INTERNAL MEDICINE

## 2017-09-22 PROCEDURE — 99214 OFFICE O/P EST MOD 30 MIN: CPT | Mod: 25,S$GLB,, | Performed by: INTERNAL MEDICINE

## 2017-09-22 PROCEDURE — 3078F DIAST BP <80 MM HG: CPT | Mod: S$GLB,,, | Performed by: INTERNAL MEDICINE

## 2017-09-22 PROCEDURE — 96372 THER/PROPH/DIAG INJ SC/IM: CPT | Mod: S$GLB,,, | Performed by: INTERNAL MEDICINE

## 2017-09-22 RX ORDER — PROMETHAZINE HYDROCHLORIDE AND DEXTROMETHORPHAN HYDROBROMIDE 6.25; 15 MG/5ML; MG/5ML
5 SYRUP ORAL EVERY 12 HOURS PRN
Qty: 180 ML | Refills: 0 | Status: SHIPPED | OUTPATIENT
Start: 2017-09-22 | End: 2017-10-02

## 2017-09-22 RX ORDER — DOXYCYCLINE 100 MG/1
100 CAPSULE ORAL 2 TIMES DAILY
Qty: 14 CAPSULE | Refills: 0 | Status: SHIPPED | OUTPATIENT
Start: 2017-09-22 | End: 2017-09-29

## 2017-09-22 RX ORDER — BENZONATATE 100 MG/1
100 CAPSULE ORAL 3 TIMES DAILY PRN
Qty: 30 CAPSULE | Refills: 0 | Status: SHIPPED | OUTPATIENT
Start: 2017-09-22 | End: 2017-10-02

## 2017-09-22 RX ORDER — METHYLPREDNISOLONE ACETATE 40 MG/ML
40 INJECTION, SUSPENSION INTRA-ARTICULAR; INTRALESIONAL; INTRAMUSCULAR; SOFT TISSUE
Status: COMPLETED | OUTPATIENT
Start: 2017-09-22 | End: 2017-09-22

## 2017-09-22 RX ADMIN — METHYLPREDNISOLONE ACETATE 40 MG: 40 INJECTION, SUSPENSION INTRA-ARTICULAR; INTRALESIONAL; INTRAMUSCULAR; SOFT TISSUE at 03:09

## 2017-09-22 NOTE — PROGRESS NOTES
SUBJECTIVE     Chief Complaint   Patient presents with    URI     since monday       HPI  Bonifacio Peoples Jr. is a 67 y.o. male with multiple medical diagnoses as listed in the medical history and problem list that presents for evaluation of URI since Monday. Pt reports a headache, productive cough, runny nose, and night sweats. Pt denies any fever or chills. He has been taking Licha Houlton Cold and Sinus and Coricidin without any improvement. Pt reports cough is worse at night and keeps him up. 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 Denies any recent travel.    PAST MEDICAL HISTORY:  Past Medical History:   Diagnosis Date    Anticoagulant long-term use     Arthritis     Rt and Lt wrist    Asthma, mild intermittent, well-controlled     Cataract     Depression     Diabetes mellitus     High cholesterol     Hypertension     Respiratory distress     Had to give a breathing treatment during last cysto procedure  2/10/12    Stroke     TIA  x3    TIA (transient ischemic attack)        PAST SURGICAL HISTORY:  Past Surgical History:   Procedure Laterality Date    CARDIAC SURGERY  2010, 2008    repair openings to heart    CYSTOSCOPY W/ LASER LITHOTRIPSY      2/2012 unsuccessful with stone removal. Ureteral stent placed    KNEE ARTHROSCOPY W/ LASER      TONSILLECTOMY      as a child       SOCIAL HISTORY:  Social History     Social History    Marital status:      Spouse name: N/A    Number of children: N/A    Years of education: N/A     Occupational History    Not on file.     Social History Main Topics    Smoking status: Never Smoker    Smokeless tobacco: Never Used    Alcohol use Yes      Comment: occassionally    Drug use: No    Sexual activity: Not on file     Other Topics Concern    Not on file     Social History Narrative    No narrative on file       FAMILY HISTORY:  Family History   Problem Relation Age of Onset    Diabetes Mother     Diabetes Sister        ALLERGIES AND  MEDICATIONS: updated and reviewed.  Review of patient's allergies indicates:   Allergen Reactions    Codeine      Childhood allergy, patient can't remember what happened.    Pcn [penicillins] Nausea And Vomiting    Iodine and iodide containing products Rash     IV dye contrast     Current Outpatient Prescriptions   Medication Sig Dispense Refill    ACCU-CHEK JASON PLUS METER [facility] USE AS INSTRUCTED 1 each 0    albuterol 90 mcg/actuation inhaler Inhale 2 puffs into the lungs every 6 (six) hours as needed for Wheezing or Shortness of Breath. Rescue 18 g 0    aspirin (ECOTRIN) 81 MG EC tablet Take 81 mg by mouth after dinner. .      bisoprolol-hydrochlorothiazide 2.5-6.25 mg (ZIAC) 2.5-6.25 mg Tab Take 1 tablet by mouth every morning. 90 tablet 1    blood sugar diagnostic Strp 100 strips by Misc.(Non-Drug; Combo Route) route once daily. 100 strip 3    blood-glucose meter kit Use as instructed 1 each 0    budesonide-formoterol 160-4.5 mcg (SYMBICORT) 160-4.5 mcg/actuation HFAA Inhale 2 puffs into the lungs every 12 (twelve) hours. 10.2 g 3    citalopram (CELEXA) 40 MG tablet Take 1 tablet (40 mg total) by mouth every morning. Last dose 2-7-13 in preparation for procedure. 90 tablet 1    gabapentin (NEURONTIN) 100 MG capsule TAKE 1 CAPSULE THREE TIMES DAILY 180 capsule 1    lancets (ACCU-CHEK SOFTCLIX LANCETS) Misc 1 Box by Misc.(Non-Drug; Combo Route) route once daily. 100 each 3    lisinopril (PRINIVIL,ZESTRIL) 2.5 MG tablet Take 1 tablet (2.5 mg total) by mouth once daily. 90 tablet 3    metformin (GLUCOPHAGE-XR) 500 MG 24 hr tablet Take 1 tablet by mouth daily with breakfast for 1 week, then take 1 tablet by mouth daily with breakfast and dinner (Patient taking differently: Take 1 tablet by mouth daily with breakfast) 60 tablet 3    phenazopyridine (PYRIDIUM) 200 MG tablet Take 200 mg by mouth 3 (three) times daily. With breakfast, with supper & at bedtime.      benzonatate (TESSALON) 100 MG capsule  "Take 1 capsule (100 mg total) by mouth 3 (three) times daily as needed. 30 capsule 0    doxycycline (MONODOX) 100 MG capsule Take 1 capsule (100 mg total) by mouth 2 (two) times daily. 14 capsule 0    promethazine-dextromethorphan (PROMETHAZINE-DM) 6.25-15 mg/5 mL Syrp Take 5 mLs by mouth every 12 (twelve) hours as needed. 180 mL 0     Current Facility-Administered Medications   Medication Dose Route Frequency Provider Last Rate Last Dose    methylPREDNISolone acetate injection 40 mg  40 mg Intramuscular 1 time in Clinic/HOD Justine Win MD           ROS  Review of Systems   Constitutional: Negative for chills and fever.   HENT: Positive for rhinorrhea. Negative for hearing loss and sore throat.    Eyes: Negative for visual disturbance.   Respiratory: Positive for cough. Negative for shortness of breath.    Cardiovascular: Negative for chest pain, palpitations and leg swelling.   Gastrointestinal: Negative for abdominal pain, constipation, diarrhea, nausea and vomiting.   Genitourinary: Negative for dysuria, frequency and urgency.   Musculoskeletal: Negative for arthralgias, joint swelling and myalgias.   Skin: Negative for rash and wound.   Neurological: Positive for headaches.   Psychiatric/Behavioral: Negative for agitation and confusion. The patient is not nervous/anxious.          OBJECTIVE     Physical Exam  Vitals:    09/22/17 1556   BP: 116/70   Pulse:    Resp:    Temp:     Body mass index is 30.75 kg/m².  Weight: 100 kg (220 lb 7.4 oz)   Height: 5' 11" (180.3 cm)     Physical Exam   Constitutional: He is oriented to person, place, and time. He appears well-developed and well-nourished. No distress.   HENT:   Head: Normocephalic and atraumatic.   Right Ear: Hearing, tympanic membrane and external ear normal.   Left Ear: Hearing, tympanic membrane and external ear normal.   Nose: Nose normal. No rhinorrhea.   Mouth/Throat: No uvula swelling. Posterior oropharyngeal erythema present. No posterior " oropharyngeal edema.   Eyes: Conjunctivae and EOM are normal. Right eye exhibits no discharge. Left eye exhibits no discharge. No scleral icterus.   Neck: Normal range of motion. Neck supple. No JVD present. No tracheal deviation present.   Cardiovascular: Normal rate, regular rhythm, normal heart sounds and intact distal pulses.  Exam reveals no gallop and no friction rub.    No murmur heard.  Pulmonary/Chest: Effort normal. No respiratory distress. He has no wheezes. He has rhonchi in the right upper field, the right lower field, the left upper field and the left lower field.   Abdominal: Soft. Bowel sounds are normal. He exhibits no distension and no mass. There is no tenderness. There is no rebound and no guarding.   Musculoskeletal: Normal range of motion. He exhibits no edema, tenderness or deformity.   Neurological: He is alert and oriented to person, place, and time. He exhibits normal muscle tone. Coordination normal.   Skin: Skin is warm and dry. No rash noted. No erythema.   Psychiatric: He has a normal mood and affect. His behavior is normal. Judgment and thought content normal.         Health Maintenance       Date Due Completion Date    TETANUS VACCINE 04/01/1970 ---    Colonoscopy 04/01/2002 ---    Zoster Vaccine 04/01/2012 ---    Influenza Vaccine 10/01/2017 (Originally 8/1/2017) 4/21/2017 (Declined)    Override on 4/21/2017: Declined    Hemoglobin A1c 02/02/2018 8/2/2017    Lipid Panel 04/24/2018 4/24/2017    Pneumococcal (65+) (2 of 2 - PCV13) 05/03/2018 5/3/2017    Foot Exam 05/03/2018 5/3/2017    Override on 5/3/2017: Done (See progress note for further details)    Eye Exam 08/04/2018 8/4/2017            ASSESSMENT     67 y.o. male with     1. Mucopurulent chronic bronchitis        PLAN:     1. Mucopurulent chronic bronchitis  - Continue symptomatic treatment with rest, increase fluid intake, tylenol or ibuprofen PRN fever(temp >/= 100.4) or body aches. Okay to take OTC antihistamines, i.e.  Bendaryl, Claritin, Allegra, etc. as needed.  - Okay to gargle with warm, salt water or use throat lozenges as needed  - Pt advised to take Abx to completion  - methylPREDNISolone acetate injection 40 mg; Inject 1 mL (40 mg total) into the muscle one time.  - doxycycline (MONODOX) 100 MG capsule; Take 1 capsule (100 mg total) by mouth 2 (two) times daily.  Dispense: 14 capsule; Refill: 0  - promethazine-dextromethorphan (PROMETHAZINE-DM) 6.25-15 mg/5 mL Syrp; Take 5 mLs by mouth every 12 (twelve) hours as needed.  Dispense: 180 mL; Refill: 0  - benzonatate (TESSALON) 100 MG capsule; Take 1 capsule (100 mg total) by mouth 3 (three) times daily as needed.  Dispense: 30 capsule; Refill: 0        RTC in 1-2 weeks as needed for any acute worsening of current condition or failure to improve     Justine Win MD  09/22/2017 3:35 PM        No Follow-up on file.

## 2017-11-16 ENCOUNTER — OFFICE VISIT (OUTPATIENT)
Dept: FAMILY MEDICINE | Facility: CLINIC | Age: 67
End: 2017-11-16
Payer: MEDICARE

## 2017-11-16 VITALS
RESPIRATION RATE: 16 BRPM | WEIGHT: 218.5 LBS | HEART RATE: 56 BPM | SYSTOLIC BLOOD PRESSURE: 120 MMHG | HEIGHT: 71 IN | DIASTOLIC BLOOD PRESSURE: 84 MMHG | OXYGEN SATURATION: 96 % | TEMPERATURE: 98 F | BODY MASS INDEX: 30.59 KG/M2

## 2017-11-16 DIAGNOSIS — H54.7 VISION PROBLEMS: ICD-10-CM

## 2017-11-16 DIAGNOSIS — I65.23 BILATERAL CAROTID ARTERY STENOSIS: ICD-10-CM

## 2017-11-16 DIAGNOSIS — Z87.442 HISTORY OF KIDNEY STONES: ICD-10-CM

## 2017-11-16 DIAGNOSIS — J45.909 PERSISTENT ASTHMA WITHOUT COMPLICATION, UNSPECIFIED ASTHMA SEVERITY: Chronic | ICD-10-CM

## 2017-11-16 DIAGNOSIS — H91.92 HEARING DEFICIT, LEFT: ICD-10-CM

## 2017-11-16 DIAGNOSIS — E78.5 HYPERLIPIDEMIA, UNSPECIFIED HYPERLIPIDEMIA TYPE: Chronic | ICD-10-CM

## 2017-11-16 DIAGNOSIS — I10 ESSENTIAL HYPERTENSION: ICD-10-CM

## 2017-11-16 DIAGNOSIS — E11.9 TYPE 2 DIABETES MELLITUS WITHOUT COMPLICATION, WITHOUT LONG-TERM CURRENT USE OF INSULIN: ICD-10-CM

## 2017-11-16 DIAGNOSIS — K21.9 GASTROESOPHAGEAL REFLUX DISEASE, ESOPHAGITIS PRESENCE NOT SPECIFIED: ICD-10-CM

## 2017-11-16 DIAGNOSIS — M16.0 BILATERAL HIP JOINT ARTHRITIS: ICD-10-CM

## 2017-11-16 DIAGNOSIS — I69.30 HISTORY OF CEREBROVASCULAR ACCIDENT (CVA) WITH RESIDUAL DEFICIT: ICD-10-CM

## 2017-11-16 DIAGNOSIS — G25.81 RESTLESS LEG SYNDROME, CONTROLLED: ICD-10-CM

## 2017-11-16 DIAGNOSIS — Z00.00 ENCOUNTER FOR PREVENTIVE HEALTH EXAMINATION: Primary | ICD-10-CM

## 2017-11-16 PROCEDURE — 99999 PR PBB SHADOW E&M-EST. PATIENT-LVL V: CPT | Mod: PBBFAC,,, | Performed by: NURSE PRACTITIONER

## 2017-11-16 PROCEDURE — 99499 UNLISTED E&M SERVICE: CPT | Mod: S$GLB,,, | Performed by: NURSE PRACTITIONER

## 2017-11-16 PROCEDURE — G0402 INITIAL PREVENTIVE EXAM: HCPCS | Mod: S$GLB,,, | Performed by: NURSE PRACTITIONER

## 2017-11-16 NOTE — PATIENT INSTRUCTIONS
Counseling and Referral of Other Preventative  (Italic type indicates deductible and co-insurance are waived)    Patient Name: Bonifacio Peoples  Today's Date: 11/16/2017      SERVICE LIMITATIONS RECOMMENDATION    Vaccines    · Pneumococcal (once after 65)    · Influenza (annually)    · Hepatitis B (if medium/high risk)    · Prevnar 13      Hepatitis B medium/high risk factors:       - End-stage renal disease       - Hemophiliacs who received Factor VII or         IX concentrates       - Clients of institutions for the mentally             retarded       - Persons who live in the same house as          a HepB carrier       - Homosexual men       - Illicit injectable drug abusers     Pneumococcal: Done, no repeat necessary     Influenza: Recommended to patient, will get at [facility]     Hepatitis B: N/A     Prevnar 13: Recommended to patient, 5/2018    Prostate cancer screening (annually to age 75)     Prostate specific antigen (PSA) Shared decision making with Provider. Sometimes a co-pay may be required if the patient decides to have this test. The USPSTF no longer recommends prostate cancer screening routinely in medicine: will discuss with pcp    Colorectal cancer screening (to age 75)    · Fecal occult blood test (annual)  · Flexible sigmoidoscopy (5y)  · Screening colonoscopy (10y)  · Barium enema   Recommended to patient, declined    Diabetes self-management training (no USPSTF recommendations)  Requires referral by treating physician for patient with diabetes or renal disease. 10 hours of initial DSMT sessions of no less than 30 minutes each in a continuous 12-month period. 2 hours of follow-up DSMT in subsequent years.  Recommended to patient, declined    Glaucoma screening (no USPSTF recommendation)  Diabetes mellitus, family history   , age 50 or over    American, age 65 or over  Recommend follow up with eye care professional regularly    Medical nutrition therapy for diabetes or renal  disease (no recommended schedule)  Requires referral by treating physician for patient with diabetes or renal disease or kidney transplant within the past 3 years.  Can be provided in same year as diabetes self-management training (DSMT), and CMS recommends medical nutrition therapy take place after DSMT. Up to 3 hours for initial year and 2 hours in subsequent years.  Recommended to patient, declined    Cardiovascular screening blood tests (every 5 years)  · Fasting lipid panel  Order as a panel if possible  Done this year, repeat every year    Diabetes screening tests (at least every 3 years, Medicare covers annually or at 6-month intervals for prediabetic patients)  · Fasting blood sugar (FBS) or glucose tolerance test (GTT)  Patient must be diagnosed with one of the following:       - Hypertension       - Dyslipidemia       - Obesity (BMI 30kg/m2)       - Previous elevated impaired FBS or GTT       ... or any two of the following:       - Overweight (BMI 25 but <30)       - Family history of diabetes       - Age 65 or older       - History of gestational diabetes or birth of baby weighing more than 9 pounds  Last done 8/2017, recommend to repeat every 6  months-due 2/2017    Abdominal aortic aneurysm screening (once)  · Sonogram   Limited to patients who meet one of the following criteria:       - Men who are 65-75 years old and have smoked more than 100 cigarette in their lifetime       - Anyone with a family history of abdominal aortic aneurysm       - Anyone recommended for screening by the USPSTF  N/A    HIV screening (annually for increased risk patients)  · HIV-1 and HIV-2 by EIA, or MANFRED, rapid antibody test or oral mucosa transudate  Patients must be at increased risk for HIV infection per USPSTF guidelines or pregnant. Tests covered annually for patient at increased risk or as requested by the patient. Pregnant patients may receive up to 3 tests during pregnancy.  Risks discussed, screening is not  recommended    Smoking cessation counseling (up to 8 sessions per year)  Patients must be asymptomatic of tobacco-related conditions to receive as a preventative service.  Non-smoker    Subsequent annual wellness visit  At least 12 months since last AWV  Return in one year     The following information is provided to all patients.  This information is to help you find resources for any of the problems found today that may be affecting your health:                Living healthy guide: www.[facility].[place].[facility]      Understanding Diabetes: www.diabetes.org      Eating healthy: www.cdc.gov/healthyweight      [facility] home safety checklist: www.cdc.gov/steadi/patient.html      Agency on Aging: www.goea.[place].[facility]      Alcoholics anonymous (AA): www.aa.org      Physical Activity: www.jo-ann.nih.gov/px9crhn      Tobacco use: www.quitwithusla.org

## 2017-11-17 PROBLEM — H54.7 VISION PROBLEMS: Status: ACTIVE | Noted: 2017-11-17

## 2017-11-18 NOTE — PROGRESS NOTES
"Bonifacio Peoples presented for a  Medicare AWV and comprehensive Health Risk Assessment today. The following components were reviewed and updated:    · Medical history  · Family History  · Social history  · Allergies and Current Medications  · Health Risk Assessment  · Health Maintenance  · Care Team     ** See Completed Assessments for Annual Wellness Visit within the encounter summary.**       The following assessments were completed:  · Living Situation  · CAGE  · Depression Screening  · Timed Get Up and Go  · Whisper Test  · Cognitive Function Screening  · Nutrition Screening  · ADL Screening  · PAQ Screening        Vitals:    11/16/17 1349   BP: 120/84   Pulse: (!) 56   Resp: 16   Temp: 98.4 °F (36.9 °C)   TempSrc: Oral   SpO2: 96%   Weight: 99.1 kg (218 lb 7.6 oz)   Height: 5' 10.5" (1.791 m)     Body mass index is 30.9 kg/m².  Physical Exam   Constitutional: He is oriented to person, place, and time. He appears well-developed. No distress.   HENT:   Head: Normocephalic.   Right Ear: External ear normal.   Left Ear: External ear normal. Tympanic membrane is injected.   Cardiovascular: Normal rate, regular rhythm and normal heart sounds.    Pulmonary/Chest: Effort normal and breath sounds normal.   Musculoskeletal: He exhibits no edema.   Neurological: He is alert and oriented to person, place, and time.   Skin: Skin is warm. He is not diaphoretic.   Psychiatric: He has a normal mood and affect. His behavior is normal. Judgment and thought content normal.         Diagnoses and health risks identified today and associated recommendations/orders:    1. Mood disorder due to stroke  Feeling  well on celexa    2. Type 2 diabetes mellitus without complication, without long-term current use of insulin  Controlled last A!C 8/2017- 5.7 on metformin and 20 pound weight loss since 4/2017    3. Bilateral carotid artery stenosis  less 50% US carotid 7/2011    4. Hyperlipidemia, unspecified hyperlipidemia type  LDL " 100-4/24/2017 on lifestyle modification    5. Persistent asthma without complication, unspecified asthma severity  Control on symbicort, albuterol prn hasn't needed last couple months    6. Essential hypertension  Control on ziac and lisinopril    7. Restless leg syndrome, controlled  Gabapentin at night prn    8. Bilateral hip joint arthritis  stable    9. Gastroesophageal reflux disease, esophagitis presence not specified  Controlled with diet, no problem since last visit    10. History of cerebrovascular accident (CVA) with residual deficit  Right eye peripheral distortion, follow by [facility], s/p ASD closure with resolution of recurrent strokes and TIAs    11. History of kidney stones  No problems x 3 years    12. Hearing deficit, left  Refer for evaluation  - Ambulatory Referral to Audiology    13. Encounter for preventive health examination  Due for colonoscopy but patient prefer to hold, flu not available, recommended to obtain at pharmacy      Provided Bonifacio with a 5-10 year written screening schedule and personal prevention plan. Recommendations were developed using the USPSTF age appropriate recommendations. Education, counseling, and referrals were provided as needed. After Visit Summary printed and given to patient which includes a list of additional screenings\tests needed.    Return follow up with Dr. Win in 3 months- around Feb and annually for HRA.    Tracy Alfonso NP

## 2017-11-29 ENCOUNTER — TELEPHONE (OUTPATIENT)
Dept: FAMILY MEDICINE | Facility: CLINIC | Age: 67
End: 2017-11-29

## 2018-01-03 DIAGNOSIS — Z77.090 ASBESTOS EXPOSURE: ICD-10-CM

## 2018-01-03 DIAGNOSIS — R06.02 SHORTNESS OF BREATH: ICD-10-CM

## 2018-01-03 DIAGNOSIS — H91.92 HEARING DEFICIT, LEFT: Primary | ICD-10-CM

## 2018-01-05 ENCOUNTER — HOSPITAL ENCOUNTER (OUTPATIENT)
Dept: RADIOLOGY | Facility: HOSPITAL | Age: 68
Discharge: HOME | End: 2018-01-05
Attending: INTERNAL MEDICINE
Payer: MEDICARE

## 2018-01-05 DIAGNOSIS — Z77.090 ASBESTOS EXPOSURE: ICD-10-CM

## 2018-01-05 DIAGNOSIS — R06.02 SHORTNESS OF BREATH: ICD-10-CM

## 2018-01-05 PROCEDURE — 71046 X-RAY EXAM CHEST 2 VIEWS: CPT | Mod: TC,FY

## 2018-01-05 PROCEDURE — 71046 X-RAY EXAM CHEST 2 VIEWS: CPT | Mod: 26,,, | Performed by: RADIOLOGY

## 2018-01-12 ENCOUNTER — OFFICE VISIT (OUTPATIENT)
Dept: FAMILY MEDICINE | Facility: CLINIC | Age: 68
End: 2018-01-12
Payer: MEDICARE

## 2018-01-12 VITALS
DIASTOLIC BLOOD PRESSURE: 66 MMHG | WEIGHT: 220 LBS | BODY MASS INDEX: 30.8 KG/M2 | RESPIRATION RATE: 16 BRPM | OXYGEN SATURATION: 98 % | TEMPERATURE: 98 F | SYSTOLIC BLOOD PRESSURE: 124 MMHG | HEART RATE: 68 BPM | HEIGHT: 71 IN

## 2018-01-12 DIAGNOSIS — E11.9 TYPE 2 DIABETES MELLITUS WITHOUT COMPLICATION, WITHOUT LONG-TERM CURRENT USE OF INSULIN: ICD-10-CM

## 2018-01-12 DIAGNOSIS — J45.21 MILD INTERMITTENT REACTIVE AIRWAY DISEASE WITH WHEEZING WITH ACUTE EXACERBATION: Primary | ICD-10-CM

## 2018-01-12 PROCEDURE — 99499 UNLISTED E&M SERVICE: CPT | Mod: S$GLB,,, | Performed by: INTERNAL MEDICINE

## 2018-01-12 PROCEDURE — 99214 OFFICE O/P EST MOD 30 MIN: CPT | Mod: 25,S$GLB,, | Performed by: INTERNAL MEDICINE

## 2018-01-12 PROCEDURE — 96372 THER/PROPH/DIAG INJ SC/IM: CPT | Mod: S$GLB,,, | Performed by: INTERNAL MEDICINE

## 2018-01-12 PROCEDURE — 99999 PR PBB SHADOW E&M-EST. PATIENT-LVL III: CPT | Mod: PBBFAC,,, | Performed by: INTERNAL MEDICINE

## 2018-01-12 RX ORDER — METHYLPREDNISOLONE SOD SUCC 125 MG
125 VIAL (EA) INJECTION ONCE
Status: COMPLETED | OUTPATIENT
Start: 2018-01-12 | End: 2018-01-12

## 2018-01-12 RX ORDER — PROMETHAZINE HYDROCHLORIDE AND DEXTROMETHORPHAN HYDROBROMIDE 6.25; 15 MG/5ML; MG/5ML
5 SYRUP ORAL EVERY 12 HOURS PRN
Qty: 180 ML | Refills: 0 | Status: SHIPPED | OUTPATIENT
Start: 2018-01-12 | End: 2018-01-18 | Stop reason: ALTCHOICE

## 2018-01-12 RX ORDER — PREDNISONE 50 MG/1
50 TABLET ORAL DAILY
Qty: 5 TABLET | Refills: 0 | Status: SHIPPED | OUTPATIENT
Start: 2018-01-13 | End: 2018-01-18

## 2018-01-12 RX ADMIN — Medication 125 MG: at 03:01

## 2018-01-12 NOTE — PROGRESS NOTES
SUBJECTIVE     Chief Complaint   Patient presents with    URI     2 days       HPI  Bonifacio Peoples Jr. is a 67 y.o. male with multiple medical diagnoses as listed in the medical history and problem list that presents for evaluation of URI x 2 days. Pt reports body aches, productive cough, sore throat, L otalgia, and nasal congestion. +night sweats, but pt denies any fever or chills. Pt has been taking Coricidin and OTC cough meds without much relief of pain. Denies any sick contacts or recent travel.     PAST MEDICAL HISTORY:  Past Medical History:   Diagnosis Date    Anticoagulant long-term use     Arthritis     Rt and Lt wrist    Asthma, mild intermittent, well-controlled     Cataract     Depression     Diabetes mellitus     High cholesterol     Hypertension     Respiratory distress     Had to give a breathing treatment during last cysto procedure  2/10/12    Stroke     TIA  x3    TIA (transient ischemic attack)        PAST SURGICAL HISTORY:  Past Surgical History:   Procedure Laterality Date    CARDIAC SURGERY  2010, 2008    repair openings to heart    CYSTOSCOPY W/ LASER LITHOTRIPSY      2/2012 unsuccessful with stone removal. Ureteral stent placed    KNEE ARTHROSCOPY W/ LASER      TONSILLECTOMY      as a child       SOCIAL HISTORY:  Social History     Social History    Marital status:      Spouse name: N/A    Number of children: N/A    Years of education: N/A     Occupational History    Not on file.     Social History Main Topics    Smoking status: Never Smoker    Smokeless tobacco: Never Used    Alcohol use Yes      Comment: occassionally    Drug use: No    Sexual activity: Not on file     Other Topics Concern    Not on file     Social History Narrative    Son and 3 stepchildren lives close by       FAMILY HISTORY:  Family History   Problem Relation Age of Onset    Diabetes Mother     Diabetes Sister        ALLERGIES AND MEDICATIONS: updated and reviewed.  Review of  patient's allergies indicates:   Allergen Reactions    Codeine      Childhood allergy, patient can't remember what happened.    Pcn [penicillins] Nausea And Vomiting    Iodine and iodide containing products Rash     IV dye contrast     Current Outpatient Prescriptions   Medication Sig Dispense Refill    ACCU-CHEK JASON PLUS METER [facility] USE AS INSTRUCTED 1 each 0    albuterol 90 mcg/actuation inhaler Inhale 2 puffs into the lungs every 6 (six) hours as needed for Wheezing or Shortness of Breath. Rescue 18 g 0    aspirin (ECOTRIN) 81 MG EC tablet Take 81 mg by mouth after dinner. .      bisoprolol-hydrochlorothiazide 2.5-6.25 mg (ZIAC) 2.5-6.25 mg Tab Take 1 tablet by mouth every morning. 90 tablet 1    blood sugar diagnostic Strp 100 strips by Misc.(Non-Drug; Combo Route) route once daily. 100 strip 3    blood-glucose meter kit Use as instructed 1 each 0    budesonide-formoterol 160-4.5 mcg (SYMBICORT) 160-4.5 mcg/actuation HFAA Inhale 2 puffs into the lungs every 12 (twelve) hours. 10.2 g 3    citalopram (CELEXA) 40 MG tablet Take 1 tablet (40 mg total) by mouth every morning. Last dose 2-7-13 in preparation for procedure. 90 tablet 1    gabapentin (NEURONTIN) 100 MG capsule TAKE 1 CAPSULE THREE TIMES DAILY 180 capsule 1    lancets (ACCU-CHEK SOFTCLIX LANCETS) Misc 1 Box by Misc.(Non-Drug; Combo Route) route once daily. 100 each 3    lisinopril (PRINIVIL,ZESTRIL) 2.5 MG tablet Take 1 tablet (2.5 mg total) by mouth once daily. 90 tablet 3    metformin (GLUCOPHAGE-XR) 500 MG 24 hr tablet Take 1 tablet by mouth daily with breakfast for 1 week, then take 1 tablet by mouth daily with breakfast and dinner (Patient taking differently: Take 1 tablet by mouth daily with breakfast) 60 tablet 3    [START ON 1/13/2018] predniSONE (DELTASONE) 50 MG Tab Take 1 tablet (50 mg total) by mouth once daily. 5 tablet 0    promethazine-dextromethorphan (PROMETHAZINE-DM) 6.25-15 mg/5 mL Syrp Take 5 mLs by mouth every 12  "(twelve) hours as needed. 180 mL 0     Current Facility-Administered Medications   Medication Dose Route Frequency Provider Last Rate Last Dose    methylPREDNISolone sodium succinate injection 125 mg  125 mg Intramuscular Once Justine Win MD           ROS  Review of Systems   Constitutional: Negative for chills and fever.   HENT: Positive for congestion and sore throat. Negative for hearing loss.    Eyes: Negative for visual disturbance.   Respiratory: Positive for cough. Negative for shortness of breath.    Cardiovascular: Negative for chest pain, palpitations and leg swelling.   Gastrointestinal: Negative for abdominal pain, constipation, diarrhea, nausea and vomiting.   Genitourinary: Negative for dysuria, frequency and urgency.   Musculoskeletal: Negative for arthralgias, joint swelling and myalgias.   Skin: Negative for rash and wound.   Neurological: Negative for headaches.   Psychiatric/Behavioral: Negative for agitation and confusion. The patient is not nervous/anxious.          OBJECTIVE     Physical Exam  Vitals:    01/12/18 1332   BP: 124/66   Pulse: 68   Resp: 16   Temp: 98.4 °F (36.9 °C)    Body mass index is 31.12 kg/m².  Weight: 99.8 kg (220 lb 0.3 oz)   Height: 5' 10.5" (179.1 cm)     Physical Exam   Constitutional: He is oriented to person, place, and time. He appears well-developed and well-nourished. No distress.   HENT:   Head: Normocephalic and atraumatic.   Right Ear: Hearing, tympanic membrane and external ear normal.   Left Ear: Hearing, tympanic membrane and external ear normal.   Nose: Nose normal.   Mouth/Throat: No uvula swelling. No posterior oropharyngeal edema or posterior oropharyngeal erythema.   Eyes: Conjunctivae and EOM are normal. Right eye exhibits no discharge. Left eye exhibits no discharge. No scleral icterus.   Neck: Normal range of motion. Neck supple. No JVD present. No tracheal deviation present.   Cardiovascular: Normal rate, regular rhythm, normal heart sounds and " intact distal pulses.  Exam reveals no gallop and no friction rub.    No murmur heard.  Pulmonary/Chest: Effort normal. No respiratory distress. He has wheezes in the left upper field and the left lower field.   Normal fremitus, no dullness to percussion, no egophany   Abdominal: Soft. Bowel sounds are normal. He exhibits no distension and no mass. There is no tenderness. There is no rebound and no guarding.   Musculoskeletal: Normal range of motion. He exhibits no edema, tenderness or deformity.   Neurological: He is alert and oriented to person, place, and time. He exhibits normal muscle tone. Coordination normal.   Skin: Skin is warm and dry. No rash noted. No erythema.   Psychiatric: He has a normal mood and affect. His behavior is normal. Judgment and thought content normal.         Health Maintenance       Date Due Completion Date    TETANUS VACCINE 04/01/1970 ---    Colonoscopy 04/01/2002 ---    Zoster Vaccine 04/01/2012 ---    Influenza Vaccine 08/01/2017 4/21/2017 (Declined)    Override on 4/21/2017: Declined    Hemoglobin A1c 02/02/2018 8/2/2017    Lipid Panel 04/24/2018 4/24/2017    Pneumococcal (65+) (2 of 2 - PCV13) 05/03/2018 5/3/2017    Foot Exam 05/03/2018 5/3/2017    Override on 5/3/2017: Done (See progress note for further details)    Eye Exam 08/04/2018 8/4/2017            ASSESSMENT     67 y.o. male with     1. Mild intermittent reactive airway disease with wheezing with acute exacerbation    2. Type 2 diabetes mellitus without complication, without long-term current use of insulin        PLAN:     1. Mild intermittent reactive airway disease with wheezing with acute exacerbation  - Continue symptomatic treatment with rest, increase fluid intake, tylenol or ibuprofen PRN fever(temp >/= 100.4) or body aches. Okay to take OTC antihistamines, i.e. Bendaryl, Claritin, Allegra, etc. as needed.  - Okay to gargle with warm, salt water or use throat lozenges as needed  - predniSONE (DELTASONE) 50 MG Tab;  Take 1 tablet (50 mg total) by mouth once daily.  Dispense: 5 tablet; Refill: 0  - methylPREDNISolone sodium succinate injection 125 mg; Inject 125 mg into the muscle once.  - promethazine-dextromethorphan (PROMETHAZINE-DM) 6.25-15 mg/5 mL Syrp; Take 5 mLs by mouth every 12 (twelve) hours as needed.  Dispense: 180 mL; Refill: 0    2. Type 2 diabetes mellitus without complication, without long-term current use of insulin  - Stable; no acute issues  - Pt made aware that blood glucose levels may be mildly elevated over next few days while on steroid therapy; he voiced understanding        RTC in 1-2 weeks as needed for any acute worsening of current condition or failure to improve    Justine Win MD  01/12/2018 1:38 PM        No Follow-up on file.

## 2018-01-15 ENCOUNTER — TELEPHONE (OUTPATIENT)
Dept: FAMILY MEDICINE | Facility: CLINIC | Age: 68
End: 2018-01-15

## 2018-01-15 NOTE — TELEPHONE ENCOUNTER
----- Message from Eli May sent at 1/15/2018  1:28 PM CST -----  Contact: self  Wife called stating that patient needs a stronger cough syrup. Please contact Emily Peoples at [phone].    Thanks!

## 2018-01-15 NOTE — TELEPHONE ENCOUNTER
Called pt and his wife answered reporting he still has a cough, but she gave him Coricidin Cough and Cold and he has not coughed since. Advised her that pt is okay to continue OTC meds. She voiced understanding.

## 2018-01-18 ENCOUNTER — OFFICE VISIT (OUTPATIENT)
Dept: FAMILY MEDICINE | Facility: CLINIC | Age: 68
End: 2018-01-18
Payer: MEDICARE

## 2018-01-18 VITALS
WEIGHT: 220.88 LBS | TEMPERATURE: 98 F | DIASTOLIC BLOOD PRESSURE: 74 MMHG | BODY MASS INDEX: 31.25 KG/M2 | HEART RATE: 65 BPM | SYSTOLIC BLOOD PRESSURE: 120 MMHG | OXYGEN SATURATION: 97 %

## 2018-01-18 DIAGNOSIS — J45.21 MILD INTERMITTENT REACTIVE AIRWAY DISEASE WITH WHEEZING WITH ACUTE EXACERBATION: Primary | ICD-10-CM

## 2018-01-18 DIAGNOSIS — J98.01 COUGH DUE TO BRONCHOSPASM: ICD-10-CM

## 2018-01-18 DIAGNOSIS — J04.0 LARYNGITIS: ICD-10-CM

## 2018-01-18 PROCEDURE — 99499 UNLISTED E&M SERVICE: CPT | Mod: S$GLB,,, | Performed by: INTERNAL MEDICINE

## 2018-01-18 PROCEDURE — 94640 AIRWAY INHALATION TREATMENT: CPT | Mod: S$GLB,,, | Performed by: INTERNAL MEDICINE

## 2018-01-18 PROCEDURE — 99999 PR PBB SHADOW E&M-EST. PATIENT-LVL III: CPT | Mod: PBBFAC,,, | Performed by: INTERNAL MEDICINE

## 2018-01-18 PROCEDURE — 99214 OFFICE O/P EST MOD 30 MIN: CPT | Mod: 25,S$GLB,, | Performed by: INTERNAL MEDICINE

## 2018-01-18 RX ORDER — BENZONATATE 200 MG/1
200 CAPSULE ORAL 3 TIMES DAILY PRN
Qty: 30 CAPSULE | Refills: 0 | Status: SHIPPED | OUTPATIENT
Start: 2018-01-18 | End: 2018-01-28

## 2018-01-18 RX ORDER — ALBUTEROL SULFATE 90 UG/1
2 AEROSOL, METERED RESPIRATORY (INHALATION) EVERY 6 HOURS PRN
Qty: 18 G | Refills: 3 | Status: SHIPPED | OUTPATIENT
Start: 2018-01-18 | End: 2019-01-01 | Stop reason: SDUPTHER

## 2018-01-18 RX ORDER — ALBUTEROL SULFATE 0.83 MG/ML
2.5 SOLUTION RESPIRATORY (INHALATION)
Status: COMPLETED | OUTPATIENT
Start: 2018-01-18 | End: 2018-01-18

## 2018-01-18 RX ADMIN — ALBUTEROL SULFATE 2.5 MG: 0.83 SOLUTION RESPIRATORY (INHALATION) at 11:01

## 2018-01-18 NOTE — PROGRESS NOTES
SUBJECTIVE     Chief Complaint   Patient presents with    Cough     sx started about 2 weeks ago    Hoarse     Corcidin. Musinex, prescribed medications    Back Pain    Wheezing       HPI  Bonifacio Peoples Jr. is a 67 y.o. male with multiple medical diagnoses as listed in the medical history and problem list that presents for follow-up of for cough x 2 weeks. Pt has a persistent dry cough along with laryngitis. He has completed the course of steroids and was taking cough meds as prescribed without any relief. He has also been taking Coricidin Cough and Cold along with Mucinex, but has not had any improvement. +night sweats, but denies any fever or chills.     PAST MEDICAL HISTORY:  Past Medical History:   Diagnosis Date    Anticoagulant long-term use     Arthritis     Rt and Lt wrist    Asthma, mild intermittent, well-controlled     Cataract     Depression     Diabetes mellitus     High cholesterol     Hypertension     Respiratory distress     Had to give a breathing treatment during last cysto procedure  2/10/12    Stroke     TIA  x3    TIA (transient ischemic attack)        PAST SURGICAL HISTORY:  Past Surgical History:   Procedure Laterality Date    CARDIAC SURGERY  2010, 2008    repair openings to heart    CYSTOSCOPY W/ LASER LITHOTRIPSY      2/2012 unsuccessful with stone removal. Ureteral stent placed    KNEE ARTHROSCOPY W/ LASER      TONSILLECTOMY      as a child       SOCIAL HISTORY:  Social History     Social History    Marital status:      Spouse name: N/A    Number of children: N/A    Years of education: N/A     Occupational History    Not on file.     Social History Main Topics    Smoking status: Never Smoker    Smokeless tobacco: Never Used    Alcohol use Yes      Comment: occassionally    Drug use: No    Sexual activity: Not on file     Other Topics Concern    Not on file     Social History Narrative    Son and 3 stepchildren lives close by       FAMILY  HISTORY:  Family History   Problem Relation Age of Onset    Diabetes Mother     Diabetes Sister        ALLERGIES AND MEDICATIONS: updated and reviewed.  Review of patient's allergies indicates:   Allergen Reactions    Codeine      Childhood allergy, patient can't remember what happened.    Pcn [penicillins] Nausea And Vomiting    Iodine and iodide containing products Rash     IV dye contrast     Current Outpatient Prescriptions   Medication Sig Dispense Refill    ACCU-CHEK JASON PLUS METER [facility] [place] INSTRUCTED 1 each 0    albuterol 90 mcg/actuation inhaler Inhale 2 puffs into the lungs every 6 (six) hours as needed for Wheezing or Shortness of Breath. Rescue 18 g 3    aspirin (ECOTRIN) 81 MG EC tablet Take 81 mg by mouth after dinner. .      bisoprolol-hydrochlorothiazide 2.5-6.25 mg (ZIAC) 2.5-6.25 mg Tab Take 1 tablet by mouth every morning. 90 tablet 1    blood sugar diagnostic Strp 100 strips by Misc.(Non-Drug; Combo Route) route once daily. 100 strip 3    blood-glucose meter kit Use as instructed 1 each 0    budesonide-formoterol 160-4.5 mcg (SYMBICORT) 160-4.5 mcg/actuation HFAA Inhale 2 puffs into the lungs every 12 (twelve) hours. 10.2 g 3    citalopram (CELEXA) 40 MG tablet Take 1 tablet (40 mg total) by mouth every morning. Last dose 2-7-13 in preparation for procedure. 90 tablet 1    gabapentin (NEURONTIN) 100 MG capsule TAKE 1 CAPSULE THREE TIMES DAILY 180 capsule 1    lancets (ACCU-CHEK SOFTCLIX LANCETS) Misc 1 Box by Misc.(Non-Drug; Combo Route) route once daily. 100 each 3    lisinopril (PRINIVIL,ZESTRIL) 2.5 MG tablet Take 1 tablet (2.5 mg total) by mouth once daily. 90 tablet 3    metformin (GLUCOPHAGE-XR) 500 MG 24 hr tablet Take 1 tablet by mouth daily with breakfast for 1 week, then take 1 tablet by mouth daily with breakfast and dinner (Patient taking differently: Take 1 tablet by mouth daily with breakfast) 60 tablet 3    predniSONE (DELTASONE) 50 MG Tab Take 1 tablet (50 mg  total) by mouth once daily. 5 tablet 0    benzonatate (TESSALON) 200 MG capsule Take 1 capsule (200 mg total) by mouth 3 (three) times daily as needed for Cough. 30 capsule 0     No current facility-administered medications for this visit.        ROS  Review of Systems   Constitutional: Negative for chills and fever.   HENT: Positive for congestion and voice change. Negative for hearing loss and sore throat.    Eyes: Negative for visual disturbance.   Respiratory: Positive for cough. Negative for shortness of breath.    Cardiovascular: Negative for chest pain, palpitations and leg swelling.   Gastrointestinal: Negative for abdominal pain, constipation, diarrhea, nausea and vomiting.   Genitourinary: Negative for dysuria, frequency and urgency.   Musculoskeletal: Negative for arthralgias, joint swelling and myalgias.   Skin: Negative for rash and wound.   Neurological: Negative for headaches.   Psychiatric/Behavioral: Negative for agitation and confusion. The patient is not nervous/anxious.          OBJECTIVE     Physical Exam  Vitals:    01/18/18 1109   BP: 120/74   Pulse: 65   Temp: 98.2 °F (36.8 °C)    Body mass index is 31.25 kg/m².  Weight: 100.2 kg (220 lb 14.4 oz)         Physical Exam   Constitutional: He is oriented to person, place, and time. He appears well-developed and well-nourished. No distress.   HENT:   Head: Normocephalic and atraumatic.   Right Ear: External ear normal.   Left Ear: External ear normal.   Nose: Nose normal.   Mouth/Throat: Oropharynx is clear and moist.   Eyes: Conjunctivae and EOM are normal. Right eye exhibits no discharge. Left eye exhibits no discharge. No scleral icterus.   Neck: Normal range of motion. Neck supple. No JVD present. No tracheal deviation present.   Cardiovascular: Normal rate, regular rhythm, normal heart sounds and intact distal pulses.  Exam reveals no gallop and no friction rub.    No murmur heard.  Pulmonary/Chest: Effort normal. No respiratory distress. He  has wheezes in the right upper field, the right lower field, the left upper field and the left lower field.   Abdominal: Soft. Bowel sounds are normal. He exhibits no distension and no mass. There is no tenderness. There is no rebound and no guarding.   Musculoskeletal: Normal range of motion. He exhibits no edema, tenderness or deformity.   Neurological: He is alert and oriented to person, place, and time. He exhibits normal muscle tone. Coordination normal.   Skin: Skin is warm and dry. No rash noted. No erythema.   Psychiatric: He has a normal mood and affect. His behavior is normal. Judgment and thought content normal.         Health Maintenance       Date Due Completion Date    TETANUS VACCINE 04/01/1970 ---    Colonoscopy 04/01/2002 ---    Zoster Vaccine 04/01/2012 ---    Hemoglobin A1c 02/02/2018 8/2/2017    Lipid Panel 04/24/2018 4/24/2017    Pneumococcal (65+) (2 of 2 - PCV13) 05/03/2018 5/3/2017    Foot Exam 05/03/2018 5/3/2017    Override on 5/3/2017: Done (See progress note for further details)    Eye Exam 08/04/2018 8/4/2017            ASSESSMENT     67 y.o. male with     1. Mild intermittent reactive airway disease with wheezing with acute exacerbation    2. Cough due to bronchospasm    3. Laryngitis        PLAN:     1. Mild intermittent reactive airway disease with wheezing with acute exacerbation  - Pt has completed steroid therapy without improvement   - Nebulizer treatment given in clinic today with wheezing markedly improved  - Will have pt continue albuterol inhaler prn   - albuterol 90 mcg/actuation inhaler; Inhale 2 puffs into the lungs every 6 (six) hours as needed for Wheezing or Shortness of Breath. Rescue  Dispense: 18 g; Refill: 3  - albuterol nebulizer solution 2.5 mg; Take 3 mLs (2.5 mg total) by nebulization one time.    2. Cough due to bronchospasm  - No improvement with Promethazine-DM, so will discontinue and start Tessalon as below  - benzonatate (TESSALON) 200 MG capsule; Take 1  capsule (200 mg total) by mouth 3 (three) times daily as needed for Cough.  Dispense: 30 capsule; Refill: 0    3. Laryngitis  - Self-limited  - Recommended vocal rest  - Okay to gargle with warm, salt water or use throat lozenges as needed  - Pt to drink warm teas with honey and lemon        RTC in 1-2 weeks as needed for any acute worsening of current condition or failure to improve     Justine Win MD  01/18/2018 11:14 AM        No Follow-up on file.

## 2018-01-29 DIAGNOSIS — I10 ESSENTIAL HYPERTENSION: ICD-10-CM

## 2018-01-29 RX ORDER — BISOPROLOL FUMARATE AND HYDROCHLOROTHIAZIDE 2.5; 6.25 MG/1; MG/1
TABLET ORAL
Qty: 90 TABLET | Refills: 1 | Status: SHIPPED | OUTPATIENT
Start: 2018-01-29 | End: 2018-07-28 | Stop reason: SDUPTHER

## 2018-01-29 RX ORDER — CITALOPRAM 40 MG/1
TABLET, FILM COATED ORAL
Qty: 90 TABLET | Refills: 1 | Status: SHIPPED | OUTPATIENT
Start: 2018-01-29 | End: 2018-07-28 | Stop reason: SDUPTHER

## 2018-01-30 ENCOUNTER — TELEPHONE (OUTPATIENT)
Dept: FAMILY MEDICINE | Facility: CLINIC | Age: 68
End: 2018-01-30

## 2018-01-30 DIAGNOSIS — J20.9 BRONCHITIS WITH BRONCHOSPASM: Primary | ICD-10-CM

## 2018-01-30 RX ORDER — ALBUTEROL SULFATE 0.83 MG/ML
2.5 SOLUTION RESPIRATORY (INHALATION) EVERY 6 HOURS PRN
Qty: 1 BOX | Refills: 5 | Status: SHIPPED | OUTPATIENT
Start: 2018-01-30 | End: 2019-01-30

## 2018-01-30 NOTE — TELEPHONE ENCOUNTER
----- Message from Nyla Pablo sent at 1/30/2018  2:25 PM CST -----  Contact: Self/[phone]  Patient would like to speak to the staff about his breathing treatment. Thank you.

## 2018-01-30 NOTE — TELEPHONE ENCOUNTER
Patient is requesting a script for a nebulizer, states that he spoke to you about this at his last visit and was told to call back when he was ready to get it.  Please advise.

## 2018-02-10 DIAGNOSIS — E11.9 TYPE 2 DIABETES MELLITUS WITHOUT COMPLICATION, WITHOUT LONG-TERM CURRENT USE OF INSULIN: ICD-10-CM

## 2018-02-10 NOTE — TELEPHONE ENCOUNTER
----- Message from Corby Ball sent at 2/10/2018 10:07 AM CST -----  Contact: self  Refill: metformin (GLUCOPHAGE-XR) 500 MG 24 hr tablet. Send to [facility] [id number] [id number] [phone]    Contact pt at [phone].    Thanks-

## 2018-02-12 DIAGNOSIS — E11.9 TYPE 2 DIABETES MELLITUS WITHOUT COMPLICATION, WITHOUT LONG-TERM CURRENT USE OF INSULIN: ICD-10-CM

## 2018-02-12 RX ORDER — METFORMIN HYDROCHLORIDE 500 MG/1
TABLET, EXTENDED RELEASE ORAL
Qty: 60 TABLET | Refills: 0 | Status: SHIPPED | OUTPATIENT
Start: 2018-02-12 | End: 2018-03-12 | Stop reason: SDUPTHER

## 2018-02-12 RX ORDER — METFORMIN HYDROCHLORIDE 500 MG/1
TABLET, EXTENDED RELEASE ORAL
Qty: 60 TABLET | Refills: 2 | OUTPATIENT
Start: 2018-02-12

## 2018-02-20 DIAGNOSIS — E11.9 TYPE 2 DIABETES MELLITUS WITHOUT COMPLICATION: ICD-10-CM

## 2018-03-12 DIAGNOSIS — E11.9 TYPE 2 DIABETES MELLITUS WITHOUT COMPLICATION, WITHOUT LONG-TERM CURRENT USE OF INSULIN: ICD-10-CM

## 2018-03-12 RX ORDER — METFORMIN HYDROCHLORIDE 500 MG/1
TABLET, EXTENDED RELEASE ORAL
Qty: 180 TABLET | Refills: 0 | Status: SHIPPED | OUTPATIENT
Start: 2018-03-12 | End: 2018-04-18 | Stop reason: SDUPTHER

## 2018-04-03 ENCOUNTER — OFFICE VISIT (OUTPATIENT)
Dept: FAMILY MEDICINE | Facility: CLINIC | Age: 68
End: 2018-04-03
Payer: MEDICARE

## 2018-04-03 VITALS
WEIGHT: 222.88 LBS | DIASTOLIC BLOOD PRESSURE: 70 MMHG | HEIGHT: 71 IN | BODY MASS INDEX: 31.2 KG/M2 | SYSTOLIC BLOOD PRESSURE: 100 MMHG | TEMPERATURE: 98 F | HEART RATE: 76 BPM | RESPIRATION RATE: 16 BRPM | OXYGEN SATURATION: 96 %

## 2018-04-03 DIAGNOSIS — M79.10 MYALGIA: ICD-10-CM

## 2018-04-03 DIAGNOSIS — J06.9 UPPER RESPIRATORY TRACT INFECTION, UNSPECIFIED TYPE: ICD-10-CM

## 2018-04-03 DIAGNOSIS — E11.9 TYPE 2 DIABETES MELLITUS WITHOUT COMPLICATION, WITHOUT LONG-TERM CURRENT USE OF INSULIN: Primary | ICD-10-CM

## 2018-04-03 DIAGNOSIS — R42 VERTIGO: ICD-10-CM

## 2018-04-03 PROCEDURE — 99213 OFFICE O/P EST LOW 20 MIN: CPT | Mod: S$GLB,,, | Performed by: PHYSICIAN ASSISTANT

## 2018-04-03 PROCEDURE — 99999 PR PBB SHADOW E&M-EST. PATIENT-LVL IV: CPT | Mod: PBBFAC,,, | Performed by: PHYSICIAN ASSISTANT

## 2018-04-03 PROCEDURE — 3044F HG A1C LEVEL LT 7.0%: CPT | Mod: CPTII,S$GLB,, | Performed by: PHYSICIAN ASSISTANT

## 2018-04-03 PROCEDURE — 3074F SYST BP LT 130 MM HG: CPT | Mod: CPTII,S$GLB,, | Performed by: PHYSICIAN ASSISTANT

## 2018-04-03 PROCEDURE — 3078F DIAST BP <80 MM HG: CPT | Mod: CPTII,S$GLB,, | Performed by: PHYSICIAN ASSISTANT

## 2018-04-03 PROCEDURE — 99499 UNLISTED E&M SERVICE: CPT | Mod: HCNC,S$GLB,, | Performed by: PHYSICIAN ASSISTANT

## 2018-04-03 RX ORDER — MECLIZINE HYDROCHLORIDE 25 MG/1
25 TABLET ORAL 3 TIMES DAILY PRN
Qty: 30 TABLET | Refills: 0 | Status: SHIPPED | OUTPATIENT
Start: 2018-04-03

## 2018-04-03 RX ORDER — AZELASTINE 1 MG/ML
1 SPRAY, METERED NASAL 2 TIMES DAILY
Qty: 30 ML | Refills: 0 | Status: SHIPPED | OUTPATIENT
Start: 2018-04-03

## 2018-04-03 RX ORDER — ASPIRIN 81 MG/1
1 TABLET ORAL DAILY
COMMUNITY
Start: 2018-02-20 | End: 2018-04-03

## 2018-04-03 NOTE — PATIENT INSTRUCTIONS
Ibuprofen aches  Astelin and benadryl for ears and runny nose   meclizine only if needed for dizziness   
Global muscle tone and symmetry normal; joint contractures absent; periods of alertness noted; grossly responds to touch, light and sound stimuli; gag reflex present; normal suck-swallow patterns for age; cry with normal variation of amplitude and frequency; tongue motility size, and shape normal without atrophy or fasciculations;  deep tendon knee reflexes normal pattern for age; katya, and grasp reflexes acceptable.

## 2018-04-03 NOTE — PROGRESS NOTES
Subjective:       Patient ID: Bonifacio Peoples Jr. is a 68 y.o. male with multiple medical diagnoses as listed in the medical history and problem list that presents for URI (this morning)  .    Chief Complaint: URI (this morning)      URI    This is a new problem. The current episode started today. There has been no fever. Associated symptoms include abdominal pain, congestion, headaches, nausea (stopped ) and a sore throat (minor ). Pertinent negatives include no chest pain, coughing, diarrhea, ear pain, rhinorrhea, sinus pain, sneezing (a couple days ago ), vomiting or wheezing. Treatments tried: tylenol 8 am  The treatment provided mild relief.     Review of Systems   Constitutional: Positive for appetite change, chills and fatigue. Negative for fever.   HENT: Positive for congestion, sinus pressure and sore throat (minor ). Negative for ear pain, hearing loss, postnasal drip, rhinorrhea, sinus pain, sneezing (a couple days ago ), trouble swallowing and voice change.    Eyes: Positive for pain, discharge, redness and itching.   Respiratory: Negative for cough, chest tightness, shortness of breath and wheezing.    Cardiovascular: Negative for chest pain.   Gastrointestinal: Positive for abdominal pain and nausea (stopped ). Negative for diarrhea and vomiting.   Musculoskeletal: Positive for myalgias.   Neurological: Positive for dizziness and headaches.         PAST MEDICAL HISTORY:  Past Medical History:   Diagnosis Date    Anticoagulant long-term use     Arthritis     Rt and Lt wrist    Asthma, mild intermittent, well-controlled     Cataract     Depression     Diabetes mellitus     High cholesterol     Hypertension     Respiratory distress     Had to give a breathing treatment during last cysto procedure  2/10/12    Stroke     TIA  x3    TIA (transient ischemic attack)        SOCIAL HISTORY:  Social History     Social History    Marital status:      Spouse name: N/A    Number of children: N/A     Years of education: N/A     Occupational History    Not on file.     Social History Main Topics    Smoking status: Never Smoker    Smokeless tobacco: Never Used    Alcohol use Yes      Comment: occassionally    Drug use: No    Sexual activity: Not on file     Other Topics Concern    Not on file     Social History Narrative    Son and 3 stepchildren lives close by       ALLERGIES AND MEDICATIONS: updated and reviewed.  Review of patient's allergies indicates:   Allergen Reactions    Codeine      Childhood allergy, patient can't remember what happened.    Pcn [penicillins] Nausea And Vomiting    Zyrtec [cetirizine]      disorientated      Iodine and iodide containing products Rash     IV dye contrast     Current Outpatient Prescriptions   Medication Sig Dispense Refill    ACCU-CHEK JASON PLUS METER [facility] [place] USE AS INSTRUCTED 1 each 0    albuterol (PROVENTIL) 2.5 mg /3 mL (0.083 %) nebulizer solution Take 3 mLs (2.5 mg total) by nebulization every 6 (six) hours as needed for Wheezing. Rescue 1 Box 5    albuterol 90 mcg/actuation inhaler Inhale 2 puffs into the lungs every 6 (six) hours as needed for Wheezing or Shortness of Breath. Rescue 18 g 3    aspirin (ECOTRIN) 81 MG EC tablet Take 81 mg by mouth after dinner. .      bisoprolol-hydrochlorothiazide 2.5-6.25 mg (ZIAC) 2.5-6.25 mg Tab TAKE 1 TABLET EVERY MORNING 90 tablet 1    blood sugar diagnostic Strp 100 strips by Misc.(Non-Drug; Combo Route) route once daily. 100 strip 3    blood-glucose meter kit Use as instructed 1 each 0    budesonide-formoterol 160-4.5 mcg (SYMBICORT) 160-4.5 mcg/actuation HFAA Inhale 2 puffs into the lungs every 12 (twelve) hours. 10.2 g 3    citalopram (CELEXA) 40 MG tablet TAKE 1 TABLET EVERY MORNING 90 tablet 1    gabapentin (NEURONTIN) 100 MG capsule TAKE 1 CAPSULE THREE TIMES DAILY 180 capsule 1    lancets (ACCU-CHEK SOFTCLIX LANCETS) Misc 1 Box by Misc.(Non-Drug; Combo Route) route once daily. 100 each 3     "lisinopril (PRINIVIL,ZESTRIL) 2.5 MG tablet Take 1 tablet (2.5 mg total) by mouth once daily. 90 tablet 3    metFORMIN (GLUCOPHAGE-XR) 500 MG 24 hr tablet TAKE ONE TABLET BY MOUTH TWICE A DAY WITH MEALS 180 tablet 0    azelastine (ASTELIN) 137 mcg (0.1 %) nasal spray 1 spray (137 mcg total) by Nasal route 2 (two) times daily. 30 mL 0    meclizine (ANTIVERT) 25 mg tablet Take 1 tablet (25 mg total) by mouth 3 (three) times daily as needed. 30 tablet 0     No current facility-administered medications for this visit.          Objective:   /70   Pulse 76   Temp 97.9 °F (36.6 °C) (Oral)   Resp 16   Ht 5' 10.5" (1.791 m)   Wt 101.1 kg (222 lb 14.2 oz)   SpO2 96%   BMI 31.53 kg/m²      Physical Exam   Constitutional: He is oriented to person, place, and time.   HENT:   Head: Normocephalic and atraumatic.   Right Ear: External ear and ear canal normal. Tympanic membrane is injected. Tympanic membrane is not erythematous.   Left Ear: External ear and ear canal normal. Tympanic membrane is injected and erythematous.   Nose: Rhinorrhea present. No mucosal edema. Right sinus exhibits no maxillary sinus tenderness and no frontal sinus tenderness. Left sinus exhibits no maxillary sinus tenderness and no frontal sinus tenderness.   Mouth/Throat: Uvula is midline, oropharynx is clear and moist and mucous membranes are normal.   Eyes: Conjunctivae and EOM are normal.   Cardiovascular: Normal rate and regular rhythm.    Pulmonary/Chest: Effort normal and breath sounds normal. He has no wheezes.   Lymphadenopathy:     He has no cervical adenopathy.   Neurological: He is alert and oriented to person, place, and time.   Skin: Skin is warm. No erythema.           Assessment:       1. Type 2 diabetes mellitus without complication, without long-term current use of insulin    2. Myalgia    3. Vertigo    4. Upper respiratory tract infection, unspecified type        Plan:       Type 2 diabetes mellitus without complication, " without long-term current use of insulin  -     Hemoglobin A1c; Future; Expected date: 04/03/2018  -     Comprehensive metabolic panel; Future; Expected date: 04/03/2018  -     Lipid panel; Future; Expected date: 04/03/2018  Follow up in 2 weeks for 6 month follow up.     Myalgia  -     POCT Influenza A/B: NEGATIVE    Vertigo  -     meclizine (ANTIVERT) 25 mg tablet; Take 1 tablet (25 mg total) by mouth 3 (three) times daily as needed.  Dispense: 30 tablet; Refill: 0    Upper respiratory tract infection, unspecified type  -     azelastine (ASTELIN) 137 mcg (0.1 %) nasal spray; 1 spray (137 mcg total) by Nasal route 2 (two) times daily.  Dispense: 30 mL; Refill: 0  Benadryl  Fluids and rest  Ibuprofen for aches    Call for any changes.           No Follow-up on file.

## 2018-04-09 ENCOUNTER — LAB VISIT (OUTPATIENT)
Dept: LAB | Facility: HOSPITAL | Age: 68
End: 2018-04-09
Attending: INTERNAL MEDICINE
Payer: MEDICARE

## 2018-04-09 DIAGNOSIS — E11.9 TYPE 2 DIABETES MELLITUS WITHOUT COMPLICATION, WITHOUT LONG-TERM CURRENT USE OF INSULIN: ICD-10-CM

## 2018-04-09 LAB
ALBUMIN SERPL BCP-MCNC: 3.5 G/DL
ALP SERPL-CCNC: 63 U/L
ALT SERPL W/O P-5'-P-CCNC: 14 U/L
ANION GAP SERPL CALC-SCNC: 6 MMOL/L
AST SERPL-CCNC: 20 U/L
BILIRUB SERPL-MCNC: 0.6 MG/DL
BUN SERPL-MCNC: 17 MG/DL
CALCIUM SERPL-MCNC: 9.1 MG/DL
CHLORIDE SERPL-SCNC: 104 MMOL/L
CHOLEST SERPL-MCNC: 147 MG/DL
CHOLEST/HDLC SERPL: 6.4 {RATIO}
CO2 SERPL-SCNC: 28 MMOL/L
CREAT SERPL-MCNC: 1.2 MG/DL
EST. GFR  (AFRICAN AMERICAN): >60 ML/MIN/1.73 M^2
EST. GFR  (NON AFRICAN AMERICAN): >60 ML/MIN/1.73 M^2
ESTIMATED AVG GLUCOSE: 117 MG/DL
GLUCOSE SERPL-MCNC: 93 MG/DL
HBA1C MFR BLD HPLC: 5.7 %
HDLC SERPL-MCNC: 23 MG/DL
HDLC SERPL: 15.6 %
LDLC SERPL CALC-MCNC: 105.4 MG/DL
NONHDLC SERPL-MCNC: 124 MG/DL
POTASSIUM SERPL-SCNC: 4.1 MMOL/L
PROT SERPL-MCNC: 6.6 G/DL
SODIUM SERPL-SCNC: 138 MMOL/L
TRIGL SERPL-MCNC: 93 MG/DL

## 2018-04-09 PROCEDURE — 83036 HEMOGLOBIN GLYCOSYLATED A1C: CPT

## 2018-04-09 PROCEDURE — 80061 LIPID PANEL: CPT

## 2018-04-09 PROCEDURE — 36415 COLL VENOUS BLD VENIPUNCTURE: CPT | Mod: PO

## 2018-04-09 PROCEDURE — 80053 COMPREHEN METABOLIC PANEL: CPT

## 2018-04-11 ENCOUNTER — DOCUMENTATION ONLY (OUTPATIENT)
Dept: ADMINISTRATIVE | Facility: HOSPITAL | Age: 68
End: 2018-04-11

## 2018-04-11 DIAGNOSIS — Z12.11 SPECIAL SCREENING FOR MALIGNANT NEOPLASMS, COLON: Primary | ICD-10-CM

## 2018-04-18 ENCOUNTER — OFFICE VISIT (OUTPATIENT)
Dept: FAMILY MEDICINE | Facility: CLINIC | Age: 68
End: 2018-04-18
Payer: MEDICARE

## 2018-04-18 VITALS
BODY MASS INDEX: 31.75 KG/M2 | HEART RATE: 61 BPM | TEMPERATURE: 98 F | OXYGEN SATURATION: 96 % | SYSTOLIC BLOOD PRESSURE: 114 MMHG | DIASTOLIC BLOOD PRESSURE: 72 MMHG | WEIGHT: 224.44 LBS

## 2018-04-18 DIAGNOSIS — E11.9 TYPE 2 DIABETES MELLITUS WITHOUT COMPLICATION, WITHOUT LONG-TERM CURRENT USE OF INSULIN: ICD-10-CM

## 2018-04-18 DIAGNOSIS — I10 ESSENTIAL HYPERTENSION: ICD-10-CM

## 2018-04-18 PROCEDURE — 3044F HG A1C LEVEL LT 7.0%: CPT | Mod: CPTII,S$GLB,, | Performed by: INTERNAL MEDICINE

## 2018-04-18 PROCEDURE — 3078F DIAST BP <80 MM HG: CPT | Mod: CPTII,S$GLB,, | Performed by: INTERNAL MEDICINE

## 2018-04-18 PROCEDURE — 99999 PR PBB SHADOW E&M-EST. PATIENT-LVL III: CPT | Mod: PBBFAC,,, | Performed by: INTERNAL MEDICINE

## 2018-04-18 PROCEDURE — 3074F SYST BP LT 130 MM HG: CPT | Mod: CPTII,S$GLB,, | Performed by: INTERNAL MEDICINE

## 2018-04-18 PROCEDURE — 99214 OFFICE O/P EST MOD 30 MIN: CPT | Mod: S$GLB,,, | Performed by: INTERNAL MEDICINE

## 2018-04-18 RX ORDER — METFORMIN HYDROCHLORIDE 500 MG/1
500 TABLET, EXTENDED RELEASE ORAL DAILY
Qty: 180 TABLET | Refills: 0 | Status: SHIPPED | OUTPATIENT
Start: 2018-04-18 | End: 2018-04-18 | Stop reason: CLARIF

## 2018-04-18 RX ORDER — METFORMIN HYDROCHLORIDE 500 MG/1
500 TABLET, EXTENDED RELEASE ORAL
Qty: 90 TABLET | Refills: 3
Start: 2018-04-18 | End: 2018-06-10 | Stop reason: SDUPTHER

## 2018-04-18 RX ORDER — LISINOPRIL 2.5 MG/1
2.5 TABLET ORAL DAILY
Qty: 90 TABLET | Refills: 3 | Status: SHIPPED | OUTPATIENT
Start: 2018-04-18 | End: 2018-05-12 | Stop reason: SDUPTHER

## 2018-04-18 NOTE — PROGRESS NOTES
SUBJECTIVE     Chief Complaint   Patient presents with    Diabetes    Follow-up    Otalgia     fluid       HPI  Bonifacio Peoples Jr. is a 68 y.o. male with multiple medical diagnoses as listed in the medical history and problem list that presents for follow-up for DM2. He is fully compliant with meds and denies any adverse side effects. He checks his blood glucose in the evening with ranges from 113-130s. He denies any hypoglycemic episodes. He tries to strictly adhere to an ADA diet. Pt is without any complaints today and just needs a refill on his Lisinopril today.     PAST MEDICAL HISTORY:  Past Medical History:   Diagnosis Date    Anticoagulant long-term use     Arthritis     Rt and Lt wrist    Asthma, mild intermittent, well-controlled     Cataract     Depression     Diabetes mellitus     High cholesterol     Hypertension     Respiratory distress     Had to give a breathing treatment during last cysto procedure  2/10/12    Stroke     TIA  x3    TIA (transient ischemic attack)        PAST SURGICAL HISTORY:  Past Surgical History:   Procedure Laterality Date    CARDIAC SURGERY  2010, 2008    repair openings to heart    CYSTOSCOPY W/ LASER LITHOTRIPSY      2/2012 unsuccessful with stone removal. Ureteral stent placed    KNEE ARTHROSCOPY W/ LASER      TONSILLECTOMY      as a child       SOCIAL HISTORY:  Social History     Social History    Marital status:      Spouse name: N/A    Number of children: N/A    Years of education: N/A     Occupational History    Not on file.     Social History Main Topics    Smoking status: Never Smoker    Smokeless tobacco: Never Used    Alcohol use Yes      Comment: occassionally    Drug use: No    Sexual activity: Not on file     Other Topics Concern    Not on file     Social History Narrative    Son and 3 stepchildren lives close by       FAMILY HISTORY:  Family History   Problem Relation Age of Onset    Diabetes Mother     Diabetes Sister         ALLERGIES AND MEDICATIONS: updated and reviewed.  Review of patient's allergies indicates:   Allergen Reactions    Codeine      Childhood allergy, patient can't remember what happened.    Pcn [penicillins] Nausea And Vomiting    Zyrtec [cetirizine]      disorientated      Iodine and iodide containing products Rash     IV dye contrast     Current Outpatient Prescriptions   Medication Sig Dispense Refill    ACCU-CHEK JASON PLUS METER [facility] [place] USE AS INSTRUCTED 1 each 0    albuterol (PROVENTIL) 2.5 mg /3 mL (0.083 %) nebulizer solution Take 3 mLs (2.5 mg total) by nebulization every 6 (six) hours as needed for Wheezing. Rescue 1 Box 5    albuterol 90 mcg/actuation inhaler Inhale 2 puffs into the lungs every 6 (six) hours as needed for Wheezing or Shortness of Breath. Rescue 18 g 3    aspirin (ECOTRIN) 81 MG EC tablet Take 81 mg by mouth after dinner. .      azelastine (ASTELIN) 137 mcg (0.1 %) nasal spray 1 spray (137 mcg total) by Nasal route 2 (two) times daily. 30 mL 0    bisoprolol-hydrochlorothiazide 2.5-6.25 mg (ZIAC) 2.5-6.25 mg Tab TAKE 1 TABLET EVERY MORNING 90 tablet 1    blood sugar diagnostic Strp 100 strips by Misc.(Non-Drug; Combo Route) route once daily. 100 strip 3    blood-glucose meter kit Use as instructed 1 each 0    budesonide-formoterol 160-4.5 mcg (SYMBICORT) 160-4.5 mcg/actuation HFAA Inhale 2 puffs into the lungs every 12 (twelve) hours. 10.2 g 3    citalopram (CELEXA) 40 MG tablet TAKE 1 TABLET EVERY MORNING 90 tablet 1    gabapentin (NEURONTIN) 100 MG capsule TAKE 1 CAPSULE THREE TIMES DAILY 180 capsule 1    lancets (ACCU-CHEK SOFTCLIX LANCETS) Misc 1 Box by Misc.(Non-Drug; Combo Route) route once daily. 100 each 3    lisinopril (PRINIVIL,ZESTRIL) 2.5 MG tablet Take 1 tablet (2.5 mg total) by mouth once daily. 90 tablet 3    meclizine (ANTIVERT) 25 mg tablet Take 1 tablet (25 mg total) by mouth 3 (three) times daily as needed. 30 tablet 0    metFORMIN (GLUCOPHAGE-XR) 500  MG 24 hr tablet TAKE ONE TABLET BY MOUTH TWICE A DAY WITH MEALS 180 tablet 0     No current facility-administered medications for this visit.        ROS  Review of Systems   Constitutional: Negative for chills and fever.   HENT: Positive for sneezing. Negative for hearing loss and sore throat.    Eyes: Positive for discharge. Negative for visual disturbance.   Respiratory: Negative for cough and shortness of breath.    Cardiovascular: Negative for chest pain, palpitations and leg swelling.   Gastrointestinal: Negative for abdominal pain, constipation, diarrhea, nausea and vomiting.   Genitourinary: Negative for dysuria, frequency and urgency.   Musculoskeletal: Negative for arthralgias, joint swelling and myalgias.   Skin: Negative for rash and wound.   Neurological: Negative for headaches.   Psychiatric/Behavioral: Negative for agitation and confusion. The patient is not nervous/anxious.        OBJECTIVE     Physical Exam  Vitals:    04/18/18 1621   BP: 114/72   Pulse: 61   Temp: 98.4 °F (36.9 °C)    Body mass index is 31.75 kg/m².  Weight: 101.8 kg (224 lb 6.9 oz)         Physical Exam   Constitutional: He is oriented to person, place, and time. He appears well-developed and well-nourished. No distress.   HENT:   Head: Normocephalic and atraumatic.   Right Ear: Hearing and external ear normal. Tympanic membrane is injected (mildly).   Left Ear: Hearing and external ear normal. Tympanic membrane is erythematous.   Nose: Nose normal.   Mouth/Throat: Oropharynx is clear and moist.   Eyes: Conjunctivae and EOM are normal. Right eye exhibits no discharge. Left eye exhibits no discharge. No scleral icterus.   Neck: Normal range of motion. Neck supple. No JVD present. No tracheal deviation present.   Cardiovascular: Normal rate, regular rhythm, normal heart sounds and intact distal pulses.  Exam reveals no gallop and no friction rub.    No murmur heard.  Pulmonary/Chest: Effort normal and breath sounds normal. No  respiratory distress. He has no wheezes.   Abdominal: Soft. Bowel sounds are normal. He exhibits no distension and no mass. There is no tenderness. There is no rebound and no guarding.   Musculoskeletal: Normal range of motion. He exhibits no edema, tenderness or deformity.   Neurological: He is alert and oriented to person, place, and time. He exhibits normal muscle tone. Coordination normal.   Skin: Skin is warm and dry. No rash noted. No erythema.   Psychiatric: He has a normal mood and affect. His behavior is normal. Judgment and thought content normal.       Health Maintenance       Date Due Completion Date    TETANUS VACCINE 04/01/1970 ---    Colonoscopy 04/01/2002 ---    Zoster Vaccine 04/01/2012 ---    Foot Exam 05/03/2018 5/3/2017    Override on 5/3/2017: Done (See progress note for further details)    Pneumococcal (65+) (2 of 2 - PCV13) 05/03/2018 5/3/2017    Eye Exam 08/04/2018 8/4/2017    Hemoglobin A1c 10/09/2018 4/9/2018    Lipid Panel 04/09/2019 4/9/2018            ASSESSMENT     68 y.o. male with     1. Type 2 diabetes mellitus without complication, without long-term current use of insulin    2. Essential hypertension        PLAN:     1. Type 2 diabetes mellitus without complication, without long-term current use of insulin  - Stable; no acute issues  - The current medical regimen is effective;  continue present plan and medications.  - Pt encouraged to continue compliance with ADA diet  - lisinopril (PRINIVIL,ZESTRIL) 2.5 MG tablet; Take 1 tablet (2.5 mg total) by mouth once daily.  Dispense: 90 tablet; Refill: 3    2. Essential hypertension  - BP well controlled; at goal of <140/90  - The current medical regimen is effective;  continue present plan and medications.  - lisinopril (PRINIVIL,ZESTRIL) 2.5 MG tablet; Take 1 tablet (2.5 mg total) by mouth once daily.  Dispense: 90 tablet; Refill: 3        RTC in 6 months     Justine Win MD  04/18/2018 4:29 PM        No Follow-up on  file.

## 2018-04-26 ENCOUNTER — LAB VISIT (OUTPATIENT)
Dept: LAB | Facility: HOSPITAL | Age: 68
End: 2018-04-26
Attending: INTERNAL MEDICINE
Payer: MEDICARE

## 2018-04-26 DIAGNOSIS — Z12.11 SPECIAL SCREENING FOR MALIGNANT NEOPLASMS, COLON: ICD-10-CM

## 2018-04-26 LAB — HEMOCCULT STL QL IA: NEGATIVE

## 2018-04-26 PROCEDURE — 82274 ASSAY TEST FOR BLOOD FECAL: CPT

## 2018-05-12 DIAGNOSIS — E11.9 TYPE 2 DIABETES MELLITUS WITHOUT COMPLICATION, WITHOUT LONG-TERM CURRENT USE OF INSULIN: ICD-10-CM

## 2018-05-12 DIAGNOSIS — I10 ESSENTIAL HYPERTENSION: ICD-10-CM

## 2018-05-14 RX ORDER — LISINOPRIL 2.5 MG/1
TABLET ORAL
Qty: 90 TABLET | Refills: 1 | Status: SHIPPED | OUTPATIENT
Start: 2018-05-14 | End: 2019-01-01 | Stop reason: SDUPTHER

## 2018-05-31 ENCOUNTER — OFFICE VISIT (OUTPATIENT)
Dept: FAMILY MEDICINE | Facility: CLINIC | Age: 68
End: 2018-05-31
Payer: MEDICARE

## 2018-05-31 VITALS
TEMPERATURE: 98 F | HEART RATE: 57 BPM | SYSTOLIC BLOOD PRESSURE: 130 MMHG | OXYGEN SATURATION: 96 % | WEIGHT: 222.25 LBS | DIASTOLIC BLOOD PRESSURE: 70 MMHG | BODY MASS INDEX: 31.44 KG/M2

## 2018-05-31 DIAGNOSIS — I10 ESSENTIAL HYPERTENSION: ICD-10-CM

## 2018-05-31 DIAGNOSIS — R45.4 DIFFICULTY CONTROLLING ANGER: Primary | ICD-10-CM

## 2018-05-31 DIAGNOSIS — Z23 NEED FOR VACCINATION WITH 13-POLYVALENT PNEUMOCOCCAL CONJUGATE VACCINE: ICD-10-CM

## 2018-05-31 PROCEDURE — 99999 PR PBB SHADOW E&M-EST. PATIENT-LVL III: CPT | Mod: PBBFAC,,, | Performed by: INTERNAL MEDICINE

## 2018-05-31 PROCEDURE — 3075F SYST BP GE 130 - 139MM HG: CPT | Mod: CPTII,S$GLB,, | Performed by: INTERNAL MEDICINE

## 2018-05-31 PROCEDURE — 90670 PCV13 VACCINE IM: CPT | Mod: S$GLB,,, | Performed by: INTERNAL MEDICINE

## 2018-05-31 PROCEDURE — G0009 ADMIN PNEUMOCOCCAL VACCINE: HCPCS | Mod: S$GLB,,, | Performed by: INTERNAL MEDICINE

## 2018-05-31 PROCEDURE — 3078F DIAST BP <80 MM HG: CPT | Mod: CPTII,S$GLB,, | Performed by: INTERNAL MEDICINE

## 2018-05-31 PROCEDURE — 99213 OFFICE O/P EST LOW 20 MIN: CPT | Mod: S$GLB,,, | Performed by: INTERNAL MEDICINE

## 2018-05-31 NOTE — PROGRESS NOTES
"SUBJECTIVE     Chief Complaint   Patient presents with    Temper Issues     attention span short. suggesting new med or med changes.        HPI  Bonifacio Peoples Jr. is a 68 y.o. male with multiple medical diagnoses as listed in the medical history and problem list that presents for evaluation of temper issues x 6-7 months. Pt presents with his wife, who helps to provide the HPI. She reports he "blows up about everything." He has been getting into spats at work. He is minding everyone's business and is argumentative about everything. He reports that it is so hard to ignore things. He feels the need to speak his piece and reports he has never been one to "bite his tongue."     PAST MEDICAL HISTORY:  Past Medical History:   Diagnosis Date    Anticoagulant long-term use     Arthritis     Rt and Lt wrist    Asthma, mild intermittent, well-controlled     Cataract     Depression     Diabetes mellitus     High cholesterol     Hypertension     Respiratory distress     Had to give a breathing treatment during last cysto procedure  2/10/12    Stroke     TIA  x3    TIA (transient ischemic attack)        PAST SURGICAL HISTORY:  Past Surgical History:   Procedure Laterality Date    CARDIAC SURGERY  2010, 2008    repair openings to heart    CYSTOSCOPY W/ LASER LITHOTRIPSY      2/2012 unsuccessful with stone removal. Ureteral stent placed    KNEE ARTHROSCOPY W/ LASER      TONSILLECTOMY      as a child       SOCIAL HISTORY:  Social History     Social History    Marital status:      Spouse name: N/A    Number of children: N/A    Years of education: N/A     Occupational History    Not on file.     Social History Main Topics    Smoking status: Never Smoker    Smokeless tobacco: Never Used    Alcohol use Yes      Comment: occassionally    Drug use: No    Sexual activity: Not on file     Other Topics Concern    Not on file     Social History Narrative    Son and 3 stepchildren lives close by       FAMILY " HISTORY:  Family History   Problem Relation Age of Onset    Diabetes Mother     Diabetes Sister        ALLERGIES AND MEDICATIONS: updated and reviewed.  Review of patient's allergies indicates:   Allergen Reactions    Codeine      Childhood allergy, patient can't remember what happened.    Pcn [penicillins] Nausea And Vomiting    Zyrtec [cetirizine]      disorientated      Iodine and iodide containing products Rash     IV dye contrast     Current Outpatient Prescriptions   Medication Sig Dispense Refill    albuterol (PROVENTIL) 2.5 mg /3 mL (0.083 %) nebulizer solution Take 3 mLs (2.5 mg total) by nebulization every 6 (six) hours as needed for Wheezing. Rescue 1 Box 5    albuterol 90 mcg/actuation inhaler Inhale 2 puffs into the lungs every 6 (six) hours as needed for Wheezing or Shortness of Breath. Rescue 18 g 3    aspirin (ECOTRIN) 81 MG EC tablet Take 81 mg by mouth after dinner. .      azelastine (ASTELIN) 137 mcg (0.1 %) nasal spray 1 spray (137 mcg total) by Nasal route 2 (two) times daily. 30 mL 0    bisoprolol-hydrochlorothiazide 2.5-6.25 mg (ZIAC) 2.5-6.25 mg Tab TAKE 1 TABLET EVERY MORNING 90 tablet 1    budesonide-formoterol 160-4.5 mcg (SYMBICORT) 160-4.5 mcg/actuation HFAA Inhale 2 puffs into the lungs every 12 (twelve) hours. 10.2 g 3    citalopram (CELEXA) 40 MG tablet TAKE 1 TABLET EVERY MORNING 90 tablet 1    gabapentin (NEURONTIN) 100 MG capsule TAKE 1 CAPSULE THREE TIMES DAILY 180 capsule 1    lisinopril (PRINIVIL,ZESTRIL) 2.5 MG tablet TAKE ONE TABLET BY MOUTH EVERY DAY 90 tablet 1    meclizine (ANTIVERT) 25 mg tablet Take 1 tablet (25 mg total) by mouth 3 (three) times daily as needed. 30 tablet 0    metFORMIN (GLUCOPHAGE-XR) 500 MG 24 hr tablet Take 1 tablet (500 mg total) by mouth daily with breakfast. 90 tablet 3    ACCU-CHEK JASON PLUS METER [facility] [place] USE AS INSTRUCTED 1 each 0    blood sugar diagnostic Strp 100 strips by Misc.(Non-Drug; Combo Route) route once daily. 100  strip 3    lancets (ACCU-CHEK SOFTCLIX LANCETS) Misc 1 Box by Misc.(Non-Drug; Combo Route) route once daily. 100 each 3     No current facility-administered medications for this visit.        ROS  Review of Systems   Constitutional: Negative for chills and fever.   HENT: Negative for hearing loss and sore throat.    Eyes: Negative for visual disturbance.   Respiratory: Negative for cough and shortness of breath.    Cardiovascular: Negative for chest pain, palpitations and leg swelling.   Gastrointestinal: Negative for abdominal pain, constipation, diarrhea, nausea and vomiting.   Genitourinary: Negative for dysuria, frequency and urgency.   Musculoskeletal: Negative for arthralgias, joint swelling and myalgias.   Skin: Negative for rash and wound.   Neurological: Negative for headaches.   Psychiatric/Behavioral: Positive for agitation. Negative for confusion. The patient is not nervous/anxious.          OBJECTIVE     Physical Exam  Vitals:    05/31/18 1453   BP: 130/70   Pulse: (!) 57   Temp: 98 °F (36.7 °C)    Body mass index is 31.44 kg/m².  Weight: 100.8 kg (222 lb 3.6 oz)         Physical Exam   Constitutional: He is oriented to person, place, and time. He appears well-developed and well-nourished. No distress.   HENT:   Head: Normocephalic and atraumatic.   Right Ear: External ear normal.   Left Ear: External ear normal.   Nose: Nose normal.   Mouth/Throat: Oropharynx is clear and moist.   Eyes: Conjunctivae and EOM are normal. Right eye exhibits no discharge. Left eye exhibits no discharge. No scleral icterus.   Neck: Normal range of motion. Neck supple. No JVD present. No tracheal deviation present.   Pulmonary/Chest: Effort normal. No respiratory distress.   Musculoskeletal: Normal range of motion. He exhibits no deformity.   Neurological: He is alert and oriented to person, place, and time. He exhibits normal muscle tone. Coordination normal.   Skin: Skin is warm and dry. No rash noted. No erythema.    Psychiatric: He has a normal mood and affect. His behavior is normal. Judgment and thought content normal.         Health Maintenance       Date Due Completion Date    TETANUS VACCINE 04/01/1970 ---    Zoster Vaccine 04/01/2012 ---    Pneumococcal (65+) (2 of 2 - PCV13) 05/03/2018 5/3/2017    Foot Exam 05/03/2018 5/3/2017    Override on 5/3/2017: Done (See progress note for further details)    Influenza Vaccine 08/01/2018 4/21/2017 (Declined)    Override on 4/21/2017: Declined    Eye Exam 08/04/2018 8/4/2017    Hemoglobin A1c 10/09/2018 4/9/2018    Lipid Panel 04/09/2019 4/9/2018    Fecal Occult Blood Test (FOBT)/[facility]Kit 04/26/2019 4/26/2018            ASSESSMENT     68 y.o. male with     1. Difficulty controlling anger    2. Essential hypertension    3. Need for vaccination with 13-polyvalent pneumococcal conjugate vaccine        PLAN:     1. Difficulty controlling anger  - Discussed need for physical exercise, deep breathing, and walking away to rationalize situation and returning to resolve in a more mild-manner; he voiced understanding  - Will also send to counseling; pt given number to C. Rockweiler and knows to call to schedule appt  - Ambulatory referral to Psychiatry    2. Essential hypertension  - BP well controlled; at goal of <140/90  - The current medical regimen is effective;  continue present plan and medications.    3. Need for vaccination with 13-polyvalent pneumococcal conjugate vaccine  - Pneumococcal Conjugate Vaccine (13 Valent) (IM)        RTC in 3 months     Justine Win MD  05/31/2018 3:07 PM        No Follow-up on file.

## 2018-06-10 DIAGNOSIS — E11.9 TYPE 2 DIABETES MELLITUS WITHOUT COMPLICATION, WITHOUT LONG-TERM CURRENT USE OF INSULIN: ICD-10-CM

## 2018-06-11 RX ORDER — METFORMIN HYDROCHLORIDE 500 MG/1
TABLET, EXTENDED RELEASE ORAL
Qty: 180 TABLET | Refills: 0 | Status: SHIPPED | OUTPATIENT
Start: 2018-06-11 | End: 2018-08-07 | Stop reason: SDUPTHER

## 2018-06-13 ENCOUNTER — PES CALL (OUTPATIENT)
Dept: ADMINISTRATIVE | Facility: CLINIC | Age: 68
End: 2018-06-13

## 2018-07-08 DIAGNOSIS — J45.909 UNCOMPLICATED ASTHMA: ICD-10-CM

## 2018-07-09 RX ORDER — BUDESONIDE AND FORMOTEROL FUMARATE DIHYDRATE 160; 4.5 UG/1; UG/1
AEROSOL RESPIRATORY (INHALATION)
Qty: 11 G | Refills: 5 | Status: SHIPPED | OUTPATIENT
Start: 2018-07-09 | End: 2018-08-07 | Stop reason: ALTCHOICE

## 2018-07-28 DIAGNOSIS — I10 ESSENTIAL HYPERTENSION: ICD-10-CM

## 2018-07-30 RX ORDER — CITALOPRAM 40 MG/1
TABLET, FILM COATED ORAL
Qty: 90 TABLET | Refills: 1 | Status: SHIPPED | OUTPATIENT
Start: 2018-07-30 | End: 2019-01-28 | Stop reason: SDUPTHER

## 2018-07-30 RX ORDER — BISOPROLOL FUMARATE AND HYDROCHLOROTHIAZIDE 2.5; 6.25 MG/1; MG/1
TABLET ORAL
Qty: 90 TABLET | Refills: 1 | Status: SHIPPED | OUTPATIENT
Start: 2018-07-30 | End: 2019-01-28 | Stop reason: SDUPTHER

## 2018-08-07 ENCOUNTER — OFFICE VISIT (OUTPATIENT)
Dept: FAMILY MEDICINE | Facility: CLINIC | Age: 68
End: 2018-08-07
Payer: MEDICARE

## 2018-08-07 VITALS
SYSTOLIC BLOOD PRESSURE: 120 MMHG | RESPIRATION RATE: 16 BRPM | OXYGEN SATURATION: 95 % | WEIGHT: 228.19 LBS | HEART RATE: 56 BPM | BODY MASS INDEX: 31.95 KG/M2 | HEIGHT: 71 IN | DIASTOLIC BLOOD PRESSURE: 76 MMHG | TEMPERATURE: 98 F

## 2018-08-07 DIAGNOSIS — K21.9 GASTROESOPHAGEAL REFLUX DISEASE, ESOPHAGITIS PRESENCE NOT SPECIFIED: ICD-10-CM

## 2018-08-07 DIAGNOSIS — F39 MOOD DISORDER: ICD-10-CM

## 2018-08-07 DIAGNOSIS — I65.23 BILATERAL CAROTID ARTERY STENOSIS: ICD-10-CM

## 2018-08-07 DIAGNOSIS — J45.909 UNCOMPLICATED ASTHMA, UNSPECIFIED ASTHMA SEVERITY, UNSPECIFIED WHETHER PERSISTENT: ICD-10-CM

## 2018-08-07 DIAGNOSIS — E66.9 OBESITY (BMI 30.0-34.9): ICD-10-CM

## 2018-08-07 DIAGNOSIS — E11.9 TYPE 2 DIABETES MELLITUS WITHOUT COMPLICATION, WITHOUT LONG-TERM CURRENT USE OF INSULIN: ICD-10-CM

## 2018-08-07 DIAGNOSIS — Z12.5 PROSTATE CANCER SCREENING: ICD-10-CM

## 2018-08-07 DIAGNOSIS — J30.1 NON-SEASONAL ALLERGIC RHINITIS DUE TO POLLEN: ICD-10-CM

## 2018-08-07 DIAGNOSIS — Z00.00 ENCOUNTER FOR PREVENTIVE HEALTH EXAMINATION: Primary | ICD-10-CM

## 2018-08-07 DIAGNOSIS — G25.81 RESTLESS LEG SYNDROME, CONTROLLED: ICD-10-CM

## 2018-08-07 DIAGNOSIS — H91.93 BILATERAL HEARING LOSS, UNSPECIFIED HEARING LOSS TYPE: ICD-10-CM

## 2018-08-07 DIAGNOSIS — E78.5 HYPERLIPIDEMIA, UNSPECIFIED HYPERLIPIDEMIA TYPE: Chronic | ICD-10-CM

## 2018-08-07 DIAGNOSIS — Z87.442 HISTORY OF KIDNEY STONES: ICD-10-CM

## 2018-08-07 DIAGNOSIS — I10 ESSENTIAL HYPERTENSION: ICD-10-CM

## 2018-08-07 DIAGNOSIS — Z86.73 HISTORY OF TIAS: Chronic | ICD-10-CM

## 2018-08-07 DIAGNOSIS — I69.30 HISTORY OF CEREBROVASCULAR ACCIDENT (CVA) WITH RESIDUAL DEFICIT: ICD-10-CM

## 2018-08-07 PROBLEM — H54.7 VISION PROBLEMS: Status: RESOLVED | Noted: 2017-11-17 | Resolved: 2018-08-07

## 2018-08-07 PROCEDURE — 3044F HG A1C LEVEL LT 7.0%: CPT | Mod: CPTII,S$GLB,, | Performed by: PHYSICIAN ASSISTANT

## 2018-08-07 PROCEDURE — 3074F SYST BP LT 130 MM HG: CPT | Mod: CPTII,S$GLB,, | Performed by: PHYSICIAN ASSISTANT

## 2018-08-07 PROCEDURE — 99999 PR PBB SHADOW E&M-EST. PATIENT-LVL V: CPT | Mod: PBBFAC,,, | Performed by: PHYSICIAN ASSISTANT

## 2018-08-07 PROCEDURE — 99499 UNLISTED E&M SERVICE: CPT | Mod: S$GLB,,, | Performed by: PHYSICIAN ASSISTANT

## 2018-08-07 PROCEDURE — G0439 PPPS, SUBSEQ VISIT: HCPCS | Mod: S$GLB,,, | Performed by: PHYSICIAN ASSISTANT

## 2018-08-07 PROCEDURE — 3078F DIAST BP <80 MM HG: CPT | Mod: CPTII,S$GLB,, | Performed by: PHYSICIAN ASSISTANT

## 2018-08-07 RX ORDER — METFORMIN HYDROCHLORIDE 500 MG/1
TABLET, EXTENDED RELEASE ORAL
Qty: 90 TABLET | Refills: 0 | Status: SHIPPED | OUTPATIENT
Start: 2018-08-07 | End: 2019-01-01 | Stop reason: SDUPTHER

## 2018-08-07 RX ORDER — FLUTICASONE FUROATE AND VILANTEROL 200; 25 UG/1; UG/1
1 POWDER RESPIRATORY (INHALATION) DAILY
Qty: 60 EACH | Refills: 0 | Status: SHIPPED | OUTPATIENT
Start: 2018-08-07 | End: 2018-11-28 | Stop reason: SDUPTHER

## 2018-08-07 NOTE — PATIENT INSTRUCTIONS
Counseling and Referral of Other Preventative  (Italic type indicates deductible and co-insurance are waived)    Patient Name: Bonifacio Peoples  Today's Date: 8/7/2018    Health Maintenance       Date Due Completion Date    TETANUS VACCINE 04/01/1970 ---    Zoster Vaccine 04/01/2012 ---    Foot Exam 05/03/2018 5/3/2017    Override on 5/3/2017: Done (See progress note for further details)    Influenza Vaccine 08/01/2018 4/21/2017 (Declined)    Override on 4/21/2017: Declined    Eye Exam 08/04/2018 8/4/2017    Hemoglobin A1c 10/18/2018 4/18/2018    Lipid Panel 04/09/2019 4/9/2018    Fecal Occult Blood Test (FOBT)/[employer]Kit 04/26/2019 4/26/2018        No orders of the defined types were placed in this encounter.    The following information is provided to all patients.  This information is to help you find resources for any of the problems found today that may be affecting your health:                Living healthy guide: www.[facility].[place].[facility]      Understanding Diabetes: www.diabetes.org      Eating healthy: www.cdc.gov/healthyweight      CDC home safety checklist: www.cdc.gov/steadi/patient.html      Agency on Aging: www.goea.[place].gov      Alcoholics anonymous (AA): www.aa.org      Physical Activity: www.jo-ann.nih.gov/hw2fvwv      Tobacco use: www.quitwithusla.org

## 2018-08-07 NOTE — PROGRESS NOTES
"Bonifacio Peoples presented for a  Medicare AWV and comprehensive Health Risk Assessment today. The following components were reviewed and updated:    · Medical history  · Family History  · Social history  · Allergies and Current Medications  · Health Risk Assessment  · Health Maintenance  · Care Team     ** See Completed Assessments for Annual Wellness Visit within the encounter summary.**       The following assessments were completed:  · Living Situation  · CAGE  · Depression Screening  · Timed Get Up and Go  · Whisper Test  · Cognitive Function Screening  · Nutrition Screening  · ADL Screening  · PAQ Screening        Vitals:    08/07/18 1404   BP: 120/76   Pulse: (!) 56   Resp: 16   Temp: 98.1 °F (36.7 °C)   TempSrc: Oral   SpO2: 95%   Weight: 103.5 kg (228 lb 2.8 oz)   Height: 5' 10.5" (1.791 m)     Body mass index is 32.28 kg/m².  Physical Exam   Constitutional: He is oriented to person, place, and time. No distress.   HENT:   Head: Normocephalic and atraumatic.   Eyes: Conjunctivae and EOM are normal.   Cardiovascular: Normal rate and regular rhythm.    Pulmonary/Chest: Effort normal and breath sounds normal. He has no wheezes.   Musculoskeletal: Normal range of motion.   Neurological: He is alert and oriented to person, place, and time.         Protective Sensation (w/ 10 gram monofilament):  Right: Intact  Left: Intact    Visual Inspection:  Normal -  Bilateral    Pedal Pulses:   Right: Present  Left: Present    Posterior tibialis:   Right:Present  Left: Present      Diagnoses and health risks identified today and associated recommendations/orders:    1. Uncomplicated asthma, unspecified asthma severity, unspecified whether persistent  Will change because he is wheezing at night. Not always having or using albuterol. He has been out of Symbicort for about a month but this has been going on for months. If good, send to [facility]. If not improved, return.   - fluticasone-vilanterol (BREO) 200-25 mcg/dose DsDv diskus " inhaler; Inhale 1 puff into the lungs once daily. Controller  Dispense: 60 each; Refill: 0    2. Type 2 diabetes mellitus without complication, without long-term current use of insulin  The current medical regimen is effective;  continue present plan and medications.  - metFORMIN (GLUCOPHAGE-XR) 500 MG 24 hr tablet; TAKE ONE TABLET BY MOUTH DAILY WITH FOOD  Dispense: 90 tablet; Refill: 0    3. Encounter for preventive health examination  Overall doing well aside from issue with knee. Will go back to ortho    4. Prostate cancer screening  Will come Thursday for this.   - PSA, Screening; Future    5. Bilateral hearing loss, unspecified hearing loss type  - Ambulatory Referral to Audiology    6. Restless leg syndrome, controlled  The current medical regimen is effective;  continue present plan and medications.    7. Mood disorder  The current medical regimen is effective;  continue present plan and medications.    8. History of TIAs  No recurrent issues    9. History of cerebrovascular accident (CVA) with residual deficit  Continue to follow up with Dr. Khalil.     10. Hyperlipidemia, unspecified hyperlipidemia type  The current medical regimen is effective;  continue present plan and medications.      11. Essential hypertension  The current medical regimen is effective;  continue present plan and medications.    12. History of kidney stones  No current issues    13. Gastroesophageal reflux disease, esophagitis presence not specified  The current medical regimen is effective;  continue present plan and medications.    14. Non-seasonal allergic rhinitis due to pollen  The current medical regimen is effective;  continue present plan and medications.    15. Bilateral carotid artery stenosis  No current issues. Continue to control RF.     16. Obesity (BMI 30.0-34.9)  Advised to increase exercise.     Will get shots at health unit. Info given by JEFF Valdovinos with a 5-10 year written screening schedule and  personal prevention plan. Recommendations were developed using the USPSTF age appropriate recommendations. Education, counseling, and referrals were provided as needed. After Visit Summary printed and given to patient which includes a list of additional screenings\tests needed.    No Follow-up on file.    PINKY Strauss

## 2018-08-13 ENCOUNTER — LAB VISIT (OUTPATIENT)
Dept: LAB | Facility: HOSPITAL | Age: 68
End: 2018-08-13
Attending: PHYSICIAN ASSISTANT
Payer: MEDICARE

## 2018-08-13 ENCOUNTER — TELEPHONE (OUTPATIENT)
Dept: ADMINISTRATIVE | Facility: HOSPITAL | Age: 68
End: 2018-08-13

## 2018-08-13 DIAGNOSIS — Z12.5 PROSTATE CANCER SCREENING: ICD-10-CM

## 2018-08-13 LAB — COMPLEXED PSA SERPL-MCNC: 1.1 NG/ML

## 2018-08-13 PROCEDURE — 36415 COLL VENOUS BLD VENIPUNCTURE: CPT | Mod: PO

## 2018-08-13 PROCEDURE — 84153 ASSAY OF PSA TOTAL: CPT

## 2018-11-28 ENCOUNTER — OFFICE VISIT (OUTPATIENT)
Dept: FAMILY MEDICINE | Facility: CLINIC | Age: 68
End: 2018-11-28
Payer: MEDICARE

## 2018-11-28 VITALS
HEART RATE: 60 BPM | TEMPERATURE: 98 F | SYSTOLIC BLOOD PRESSURE: 114 MMHG | BODY MASS INDEX: 33.74 KG/M2 | WEIGHT: 238.56 LBS | OXYGEN SATURATION: 96 % | DIASTOLIC BLOOD PRESSURE: 70 MMHG

## 2018-11-28 DIAGNOSIS — H66.92 LEFT OTITIS MEDIA, UNSPECIFIED OTITIS MEDIA TYPE: Primary | ICD-10-CM

## 2018-11-28 DIAGNOSIS — J45.40 MODERATE PERSISTENT ASTHMA WITHOUT COMPLICATION: Chronic | ICD-10-CM

## 2018-11-28 DIAGNOSIS — Z23 NEEDS FLU SHOT: ICD-10-CM

## 2018-11-28 PROCEDURE — 99214 OFFICE O/P EST MOD 30 MIN: CPT | Mod: HCNC,25,S$GLB, | Performed by: INTERNAL MEDICINE

## 2018-11-28 PROCEDURE — 1101F PT FALLS ASSESS-DOCD LE1/YR: CPT | Mod: CPTII,HCNC,S$GLB, | Performed by: INTERNAL MEDICINE

## 2018-11-28 PROCEDURE — G0008 ADMIN INFLUENZA VIRUS VAC: HCPCS | Mod: HCNC,S$GLB,, | Performed by: INTERNAL MEDICINE

## 2018-11-28 PROCEDURE — 3078F DIAST BP <80 MM HG: CPT | Mod: CPTII,HCNC,S$GLB, | Performed by: INTERNAL MEDICINE

## 2018-11-28 PROCEDURE — 3074F SYST BP LT 130 MM HG: CPT | Mod: CPTII,HCNC,S$GLB, | Performed by: INTERNAL MEDICINE

## 2018-11-28 PROCEDURE — 90662 IIV NO PRSV INCREASED AG IM: CPT | Mod: HCNC,S$GLB,, | Performed by: INTERNAL MEDICINE

## 2018-11-28 PROCEDURE — 99999 PR PBB SHADOW E&M-EST. PATIENT-LVL III: CPT | Mod: PBBFAC,HCNC,, | Performed by: INTERNAL MEDICINE

## 2018-11-28 RX ORDER — FLUTICASONE FUROATE AND VILANTEROL 200; 25 UG/1; UG/1
1 POWDER RESPIRATORY (INHALATION) DAILY
Qty: 60 EACH | Refills: 0 | Status: SHIPPED | OUTPATIENT
Start: 2018-11-28 | End: 2018-12-04

## 2018-11-28 RX ORDER — AZITHROMYCIN 250 MG/1
TABLET, FILM COATED ORAL
Qty: 6 TABLET | Refills: 0 | Status: SHIPPED | OUTPATIENT
Start: 2018-11-28 | End: 2018-12-03

## 2018-11-28 NOTE — PROGRESS NOTES
SUBJECTIVE     Chief Complaint   Patient presents with    Otalgia     L ear pain x 1.5 weeks.       HPI  Bonifacio Peoples Jr. is a 68 y.o. male with multiple medical diagnoses as listed in the medical history and problem list that presents for evaluation of L otalgia x weeks. Pt reports pain in the L ear is stabbing at an 8/10 and constant in nature with radiation down the L neck. Denies any fever, chills, or night sweats. Pt has been taking Tylenol and applying peroxide with some improvement in pain. Denies any sick contacts/recent travel.    PAST MEDICAL HISTORY:  Past Medical History:   Diagnosis Date    Anticoagulant long-term use     Arthritis     Rt and Lt wrist    Asthma, mild intermittent, well-controlled     Cataract     Depression     Diabetes mellitus     High cholesterol     Hypertension     Respiratory distress     Had to give a breathing treatment during last cysto procedure  2/10/12    Stroke     TIA  x3    TIA (transient ischemic attack)     Type 2 diabetes mellitus        PAST SURGICAL HISTORY:  Past Surgical History:   Procedure Laterality Date    CARDIAC SURGERY  2010, 2008    repair openings to heart    CYSTOSCOPY W/ LASER LITHOTRIPSY      2/2012 unsuccessful with stone removal. Ureteral stent placed    CYSTOSCOPY, WITH RETROGRADE PYELOGRAM AND URETERAL STENT INSERTION Bilateral 2/28/2013    [facility]    CYSTOSCOPY, WITH RETROGRADE PYELOGRAM AND URETERAL STENT INSERTION Right 2/14/2013    [facility]    DILATION, URETER, USING BALLOON Right 2/14/2013    [facility]formed [facility]    FLUOROSCOPY N/A 2/14/2013    [facility]    INSERTION, CATHETER  2/14/2013    [facility]    KNEE ARTHROSCOPY W/ LASER Right     x 2    LITHOTRIPSY, ESWL Right 1/14/2013    [facility]     LITHOTRIPSY, USING LASER Right 2/14/2013    [facility]    MANIPULATION, CALCULUS Right 2/28/2013    [facility]    MANIPULATION, CALCULUS Right 2/14/2013    [facility]    PYELOGRAM, RETROGRADE Bilateral 2/14/2013    [facility]    TONSILLECTOMY      as a child       SOCIAL HISTORY:  Social History     Socioeconomic History    Marital status:      Spouse name: Not on file    Number of children: Not on file    Years of education: Not on file    Highest education level: Not on file   Social Needs    Financial resource strain: Not on file    Food insecurity - worry: Not on file    Food insecurity - inability: Not on file    Transportation needs - medical: Not on file    Transportation needs - non-medical: Not on file   Occupational History    Not on file   Tobacco Use    Smoking status: Never Smoker    Smokeless tobacco: Never Used   Substance and Sexual Activity    Alcohol use: Yes     Comment: occassionally every 2-3 months     Drug use: No    Sexual activity: Yes     Partners: Female   Other Topics Concern    Not on file   Social History Narrative    Son and 3 stepchildren lives close by       FAMILY HISTORY:  Family History   Problem Relation Age of Onset    Diabetes Mother     No Known Problems Father     Diabetes Sister     Heart attack Sister     Heart disease Paternal Grandfather        ALLERGIES AND MEDICATIONS: updated and reviewed.  Review of patient's allergies indicates:   Allergen Reactions    Codeine      Childhood allergy, patient can't remember what happened.    Pcn [penicillins] Nausea And Vomiting    Zyrtec [cetirizine]      disorientated      Iodine and iodide containing products Rash     IV dye contrast     Current Outpatient Medications   Medication Sig Dispense Refill    ACCU-CHEK JASON PLUS METER Misc USE AS  INSTRUCTED 1 each 0    albuterol (PROVENTIL) 2.5 mg /3 mL (0.083 %) nebulizer solution Take 3 mLs (2.5 mg total) by nebulization every 6 (six) hours as needed for Wheezing. Rescue 1 Box 5    albuterol 90 mcg/actuation inhaler Inhale 2 puffs into the lungs every 6 (six) hours as needed for Wheezing or Shortness of Breath. Rescue 18 g 3    aspirin (ECOTRIN) 81 MG EC tablet Take 81 mg by mouth after dinner. .      azelastine (ASTELIN) 137 mcg (0.1 %) nasal spray 1 spray (137 mcg total) by Nasal route 2 (two) times daily. 30 mL 0    bisoprolol-hydrochlorothiazide 2.5-6.25 mg (ZIAC) 2.5-6.25 mg Tab TAKE 1 TABLET EVERY MORNING 90 tablet 1    blood sugar diagnostic Strp 100 strips by Misc.(Non-Drug; Combo Route) route once daily. 100 strip 3    citalopram (CELEXA) 40 MG tablet TAKE 1 TABLET EVERY MORNING 90 tablet 1    fluticasone-vilanterol (BREO) 200-25 mcg/dose DsDv diskus inhaler Inhale 1 puff into the lungs once daily. Controller 60 each 0    gabapentin (NEURONTIN) 100 MG capsule TAKE 1 CAPSULE THREE TIMES DAILY 180 capsule 1    lancets (ACCU-CHEK SOFTCLIX LANCETS) Misc 1 Box by Misc.(Non-Drug; Combo Route) route once daily. 100 each 3    lisinopril (PRINIVIL,ZESTRIL) 2.5 MG tablet TAKE ONE TABLET BY MOUTH EVERY DAY 90 tablet 1    meclizine (ANTIVERT) 25 mg tablet Take 1 tablet (25 mg total) by mouth 3 (three) times daily as needed. 30 tablet 0    metFORMIN (GLUCOPHAGE-XR) 500 MG 24 hr tablet TAKE ONE TABLET BY MOUTH DAILY WITH FOOD 90 tablet 0    azithromycin (Z-LADARIUS) 250 MG tablet Take 2 tablets by mouth on day 1; Take 1 tablet by mouth on days 2-5 6 tablet 0     No current facility-administered medications for this visit.        ROS  Review of Systems   Constitutional: Negative for chills and fever.   HENT: Positive for ear pain (left). Negative for hearing loss and sore throat.    Eyes: Negative for visual disturbance.   Respiratory: Negative for cough and shortness of breath.    Cardiovascular:  Negative for chest pain, palpitations and leg swelling.   Gastrointestinal: Negative for abdominal pain, constipation, diarrhea, nausea and vomiting.   Genitourinary: Negative for dysuria, frequency and urgency.   Musculoskeletal: Negative for arthralgias, joint swelling and myalgias.   Skin: Negative for rash and wound.   Neurological: Negative for headaches.   Psychiatric/Behavioral: Negative for agitation and confusion. The patient is not nervous/anxious.          OBJECTIVE     Physical Exam  Vitals:    11/28/18 1051   BP: 114/70   Pulse: 60   Temp: 98.4 °F (36.9 °C)    Body mass index is 33.74 kg/m².  Weight: 108.2 kg (238 lb 8.6 oz)         Physical Exam   Constitutional: He is oriented to person, place, and time. He appears well-developed and well-nourished. No distress.   HENT:   Head: Normocephalic and atraumatic.   Right Ear: Hearing, tympanic membrane and external ear normal.   Left Ear: Hearing and external ear normal. There is tenderness. No drainage or swelling. Tympanic membrane is erythematous.  No middle ear effusion.   Nose: Nose normal.   Mouth/Throat: Oropharynx is clear and moist. No uvula swelling. No posterior oropharyngeal edema or posterior oropharyngeal erythema.   Eyes: Conjunctivae and EOM are normal. Right eye exhibits no discharge. Left eye exhibits no discharge. No scleral icterus.   Neck: Normal range of motion. Neck supple. No JVD present. No tracheal deviation present.   Cardiovascular: Normal rate, regular rhythm, normal heart sounds and intact distal pulses. Exam reveals no gallop and no friction rub.   No murmur heard.  Pulmonary/Chest: Effort normal and breath sounds normal. No respiratory distress. He has no wheezes.   Abdominal: Soft. Bowel sounds are normal. He exhibits no distension and no mass. There is no tenderness. There is no rebound and no guarding.   Musculoskeletal: Normal range of motion. He exhibits no edema, tenderness or deformity.   Neurological: He is alert and  oriented to person, place, and time. He exhibits normal muscle tone. Coordination normal.   Skin: Skin is warm and dry. No rash noted. No erythema.   Psychiatric: He has a normal mood and affect. His behavior is normal. Judgment and thought content normal.         Health Maintenance       Date Due Completion Date    TETANUS VACCINE 04/01/1970 ---    Zoster Vaccine 04/01/2012 ---    Influenza Vaccine 08/01/2018 4/21/2017 (Declined)    Override on 4/21/2017: Declined    Eye Exam 08/04/2018 8/4/2017    Hemoglobin A1c 02/07/2019 8/7/2018    Lipid Panel 04/09/2019 4/9/2018    Fecal Occult Blood Test (FOBT)/FitKit 04/26/2019 4/26/2018    Foot Exam 08/07/2019 8/7/2018    Override on 8/7/2018: Done    Override on 5/3/2017: Done (See progress note for further details)            ASSESSMENT     68 y.o. male with     1. Left otitis media, unspecified otitis media type    2. Moderate persistent asthma without complication    3. Needs flu shot        PLAN:     1. Left otitis media, unspecified otitis media type  - Pt to start oral Abx and take to completion; low threshold to proceed with otic drops   - azithromycin (Z-LADARIUS) 250 MG tablet; Take 2 tablets by mouth on day 1; Take 1 tablet by mouth on days 2-5  Dispense: 6 tablet; Refill: 0    2. Moderate persistent asthma without complication  - Stable; no acute issues  - The current medical regimen is effective;  continue present plan and medications.  - fluticasone-vilanterol (BREO) 200-25 mcg/dose DsDv diskus inhaler; Inhale 1 puff into the lungs once daily. Controller  Dispense: 60 each; Refill: 0    3. Needs flu shot  - Influenza - High Dose (65+) (PF) (IM)        RTC in 1-2 weeks as needed for any acute worsening of current condition or failure to improve       Justine Win MD  11/28/2018 11:14 AM        No Follow-up on file.

## 2018-12-04 ENCOUNTER — OFFICE VISIT (OUTPATIENT)
Dept: FAMILY MEDICINE | Facility: CLINIC | Age: 68
End: 2018-12-04
Payer: MEDICARE

## 2018-12-04 VITALS
WEIGHT: 238.31 LBS | SYSTOLIC BLOOD PRESSURE: 126 MMHG | HEART RATE: 61 BPM | TEMPERATURE: 98 F | BODY MASS INDEX: 33.71 KG/M2 | DIASTOLIC BLOOD PRESSURE: 80 MMHG | OXYGEN SATURATION: 97 %

## 2018-12-04 DIAGNOSIS — H60.502 ACUTE OTITIS EXTERNA OF LEFT EAR, UNSPECIFIED TYPE: Primary | ICD-10-CM

## 2018-12-04 DIAGNOSIS — J45.41 MODERATE PERSISTENT ASTHMA WITH ACUTE EXACERBATION: Chronic | ICD-10-CM

## 2018-12-04 PROCEDURE — 96372 THER/PROPH/DIAG INJ SC/IM: CPT | Mod: HCNC,S$GLB,, | Performed by: INTERNAL MEDICINE

## 2018-12-04 PROCEDURE — 1101F PT FALLS ASSESS-DOCD LE1/YR: CPT | Mod: CPTII,HCNC,S$GLB, | Performed by: INTERNAL MEDICINE

## 2018-12-04 PROCEDURE — 3079F DIAST BP 80-89 MM HG: CPT | Mod: CPTII,HCNC,S$GLB, | Performed by: INTERNAL MEDICINE

## 2018-12-04 PROCEDURE — 99999 PR PBB SHADOW E&M-EST. PATIENT-LVL III: CPT | Mod: PBBFAC,HCNC,, | Performed by: INTERNAL MEDICINE

## 2018-12-04 PROCEDURE — 99214 OFFICE O/P EST MOD 30 MIN: CPT | Mod: 25,HCNC,S$GLB, | Performed by: INTERNAL MEDICINE

## 2018-12-04 PROCEDURE — 99499 UNLISTED E&M SERVICE: CPT | Mod: S$GLB,,, | Performed by: INTERNAL MEDICINE

## 2018-12-04 PROCEDURE — 3074F SYST BP LT 130 MM HG: CPT | Mod: CPTII,HCNC,S$GLB, | Performed by: INTERNAL MEDICINE

## 2018-12-04 RX ORDER — BUDESONIDE AND FORMOTEROL FUMARATE DIHYDRATE 160; 4.5 UG/1; UG/1
AEROSOL RESPIRATORY (INHALATION)
COMMUNITY
Start: 2018-11-28 | End: 2019-01-01 | Stop reason: SDUPTHER

## 2018-12-04 RX ORDER — METHYLPREDNISOLONE SOD SUCC 125 MG
125 VIAL (EA) INJECTION ONCE
Status: COMPLETED | OUTPATIENT
Start: 2018-12-04 | End: 2018-12-04

## 2018-12-04 RX ORDER — OFLOXACIN 3 MG/ML
10 SOLUTION AURICULAR (OTIC) DAILY
Qty: 5 ML | Refills: 0 | Status: SHIPPED | OUTPATIENT
Start: 2018-12-04 | End: 2018-12-11

## 2018-12-04 RX ADMIN — Medication 125 MG: at 04:12

## 2018-12-04 NOTE — PROGRESS NOTES
SUBJECTIVE     Chief Complaint   Patient presents with    Follow-up    Otalgia     still having ear pain    URI       HPI  Bonifacio Peoples Jr. is a 68 y.o. male with multiple medical diagnoses as listed in the medical history and problem list that presents for follow-up for continued L otalgia x 2.5 weeks. Pt reports continued pain in the L ear that feels like someone is twisting his ear. He completed his course of oral Abx without improvement. Denies any fever, chills, or night sweats. Pt also reports a productive cough. He has been taking Mucinex with some improvement in pain. Denies any sick contacts/recent travel.    PAST MEDICAL HISTORY:  Past Medical History:   Diagnosis Date    Anticoagulant long-term use     Arthritis     Rt and Lt wrist    Asthma, mild intermittent, well-controlled     Cataract     Depression     Diabetes mellitus     High cholesterol     Hypertension     Respiratory distress     Had to give a breathing treatment during last cysto procedure  2/10/12    Stroke     TIA  x3    TIA (transient ischemic attack)     Type 2 diabetes mellitus        PAST SURGICAL HISTORY:  Past Surgical History:   Procedure Laterality Date    CARDIAC SURGERY  2010, 2008    repair openings to heart    CYSTOSCOPY W/ LASER LITHOTRIPSY      2/2012 unsuccessful with stone removal. Ureteral stent placed    CYSTOSCOPY, WITH RETROGRADE PYELOGRAM AND URETERAL STENT INSERTION Bilateral 2/28/2013    [facility]    CYSTOSCOPY, WITH RETROGRADE PYELOGRAM AND URETERAL STENT INSERTION Right 2/14/2013    [facility]    DILATION, URETER, USING BALLOON Right 2/14/2013    [facility]    FLUOROSCOPY N/A 2/14/2013    [facility]    INSERTION, CATHETER  2/14/2013    [facility]    KNEE ARTHROSCOPY W/ LASER Right     x 2    LITHOTRIPSY,  ESWL Right 1/14/2013    Performed [facility] [place]    LITHOTRIPSY, USING LASER Right 2/14/2013    Performed by [facility] at [facility] [place]    MANIPULATION, CALCULUS Right 2/28/2013    Performed [facility] [place]    MANIPULATION, CALCULUS Right 2/14/2013    Performed [facility] [place]    PYELOGRAM, RETROGRADE Bilateral 2/14/2013    Performed by [facility] [place]    TONSILLECTOMY      as a child       SOCIAL HISTORY:  Social History     Socioeconomic History    Marital status:      Spouse name: Not on file    Number of children: Not on file    Years of education: Not on file    Highest education level: Not on file   Social Needs    Financial resource strain: Not on file    Food insecurity - worry: Not on file    Food insecurity - inability: Not on file    Transportation needs - medical: Not on file    Transportation needs - non-medical: Not on file   Occupational History    Not on file   Tobacco Use    Smoking status: Never Smoker    Smokeless tobacco: Never Used   Substance and Sexual Activity    Alcohol use: Yes     Comment: occassionally every 2-3 months     Drug use: No    Sexual activity: Yes     Partners: Female   Other Topics Concern    Not on file   Social History Narrative    Son and 3 stepchildren lives close by       FAMILY HISTORY:  Family History   Problem Relation Age of Onset    Diabetes Mother     No Known Problems Father     Diabetes Sister     Heart attack Sister     Heart disease Paternal Grandfather        ALLERGIES AND MEDICATIONS: updated and reviewed.  Review of patient's allergies indicates:   Allergen Reactions    Codeine      Childhood allergy, patient can't remember what happened.    Pcn [penicillins] Nausea And Vomiting    Zyrtec [cetirizine]      disorientated      Iodine and iodide containing products Rash     IV dye contrast     Current Outpatient Medications    Medication Sig Dispense Refill    ACCU-CHEK JASON PLUS METER [facility] USE AS INSTRUCTED 1 each 0    albuterol (PROVENTIL) 2.5 mg /3 mL (0.083 %) nebulizer solution Take 3 mLs (2.5 mg total) by nebulization every 6 (six) hours as needed for Wheezing. Rescue 1 Box 5    albuterol 90 mcg/actuation inhaler Inhale 2 puffs into the lungs every 6 (six) hours as needed for Wheezing or Shortness of Breath. Rescue 18 g 3    aspirin (ECOTRIN) 81 MG EC tablet Take 81 mg by mouth after dinner. .      azelastine (ASTELIN) 137 mcg (0.1 %) nasal spray 1 spray (137 mcg total) by Nasal route 2 (two) times daily. 30 mL 0    bisoprolol-hydrochlorothiazide 2.5-6.25 mg (ZIAC) 2.5-6.25 mg Tab TAKE 1 TABLET EVERY MORNING 90 tablet 1    blood sugar diagnostic Strp 100 strips by Misc.(Non-Drug; Combo Route) route once daily. 100 strip 3    citalopram (CELEXA) 40 MG tablet TAKE 1 TABLET EVERY MORNING 90 tablet 1    gabapentin (NEURONTIN) 100 MG capsule TAKE 1 CAPSULE THREE TIMES DAILY 180 capsule 1    lancets (ACCU-CHEK SOFTCLIX LANCETS) Misc 1 Box by Misc.(Non-Drug; Combo Route) route once daily. 100 each 3    lisinopril (PRINIVIL,ZESTRIL) 2.5 MG tablet TAKE ONE TABLET BY MOUTH EVERY DAY 90 tablet 1    meclizine (ANTIVERT) 25 mg tablet Take 1 tablet (25 mg total) by mouth 3 (three) times daily as needed. 30 tablet 0    metFORMIN (GLUCOPHAGE-XR) 500 MG 24 hr tablet TAKE ONE TABLET BY MOUTH DAILY WITH FOOD 90 tablet 0    SYMBICORT 160-4.5 mcg/actuation HFAA       ofloxacin (FLOXIN) 0.3 % otic solution Place 10 drops into the left ear once daily. for 7 days 5 mL 0     No current facility-administered medications for this visit.        ROS  Review of Systems   Constitutional: Negative for chills and fever.   HENT: Positive for ear pain (left). Negative for hearing loss and sore throat.    Eyes: Negative for visual disturbance.   Respiratory: Positive for cough. Negative for shortness of breath.    Cardiovascular: Negative for chest  pain, palpitations and leg swelling.   Gastrointestinal: Negative for abdominal pain, constipation, diarrhea, nausea and vomiting.   Genitourinary: Negative for dysuria, frequency and urgency.   Musculoskeletal: Negative for arthralgias, joint swelling and myalgias.   Skin: Negative for rash and wound.   Neurological: Negative for headaches.   Psychiatric/Behavioral: Negative for agitation and confusion. The patient is not nervous/anxious.          OBJECTIVE     Physical Exam  Vitals:    12/04/18 1530   BP: 126/80   Pulse: 61   Temp: 97.8 °F (36.6 °C)    Body mass index is 33.71 kg/m².  Weight: 108.1 kg (238 lb 5.1 oz)         Physical Exam   Constitutional: He is oriented to person, place, and time. He appears well-developed and well-nourished. No distress.   HENT:   Head: Normocephalic and atraumatic.   Right Ear: Hearing, tympanic membrane and external ear normal.   Left Ear: Hearing and external ear normal. There is tenderness. Tympanic membrane is erythematous.   Nose: Nose normal.   Mouth/Throat: Oropharynx is clear and moist.   Eyes: Conjunctivae and EOM are normal. Right eye exhibits no discharge. Left eye exhibits no discharge. No scleral icterus.   Neck: Normal range of motion. Neck supple. No JVD present. No tracheal deviation present.   Cardiovascular: Normal rate, regular rhythm, normal heart sounds and intact distal pulses. Exam reveals no gallop and no friction rub.   No murmur heard.  Pulmonary/Chest: Effort normal and breath sounds normal. No respiratory distress. He has no wheezes.   Abdominal: Soft. Bowel sounds are normal. He exhibits no distension and no mass. There is no tenderness. There is no rebound and no guarding.   Musculoskeletal: Normal range of motion. He exhibits no edema, tenderness or deformity.   Neurological: He is alert and oriented to person, place, and time. He exhibits normal muscle tone. Coordination normal.   Skin: Skin is warm and dry. No rash noted. No erythema.    Psychiatric: He has a normal mood and affect. His behavior is normal. Judgment and thought content normal.         Health Maintenance       Date Due Completion Date    TETANUS VACCINE 04/01/1970 ---    Zoster Vaccine 04/01/2012 ---    Eye Exam 08/04/2018 8/4/2017    Hemoglobin A1c 02/07/2019 8/7/2018    Lipid Panel 04/09/2019 4/9/2018    Fecal Occult Blood Test (FOBT)/FitKit 04/26/2019 4/26/2018    Foot Exam 08/07/2019 8/7/2018    Override on 8/7/2018: Done    Override on 5/3/2017: Done (See progress note for further details)            ASSESSMENT     68 y.o. male with     1. Acute otitis externa of left ear, unspecified type    2. Moderate persistent asthma with acute exacerbation        PLAN:     1. Acute otitis externa of left ear, unspecified type  - Pt advised to take Abx to completion   - ofloxacin (FLOXIN) 0.3 % otic solution; Place 10 drops into the left ear once daily. for 7 days  Dispense: 5 mL; Refill: 0  - Ambulatory referral to ENT    2. Moderate persistent asthma with acute exacerbation  - Pt to use inhalers as previously ordered  - methylPREDNISolone sodium succinate injection 125 mg        RTC in 1-2 weeks as needed for any acute worsening of current condition or failure to improve       Justine Win MD  12/04/2018 4:01 PM        No Follow-up on file.

## 2018-12-07 ENCOUNTER — OFFICE VISIT (OUTPATIENT)
Dept: OTOLARYNGOLOGY | Facility: CLINIC | Age: 68
End: 2018-12-07
Payer: MEDICARE

## 2018-12-07 VITALS — HEIGHT: 71 IN | BODY MASS INDEX: 33.77 KG/M2 | WEIGHT: 241.19 LBS

## 2018-12-07 DIAGNOSIS — M26.609 TEMPOROMANDIBULAR JOINT DYSFUNCTION: ICD-10-CM

## 2018-12-07 DIAGNOSIS — H92.02 LEFT EAR PAIN: Primary | ICD-10-CM

## 2018-12-07 PROCEDURE — 99999 PR PBB SHADOW E&M-EST. PATIENT-LVL III: CPT | Mod: PBBFAC,HCNC,, | Performed by: OTOLARYNGOLOGY

## 2018-12-07 PROCEDURE — 99203 OFFICE O/P NEW LOW 30 MIN: CPT | Mod: HCNC,S$GLB,, | Performed by: OTOLARYNGOLOGY

## 2018-12-07 PROCEDURE — 1101F PT FALLS ASSESS-DOCD LE1/YR: CPT | Mod: CPTII,HCNC,S$GLB, | Performed by: OTOLARYNGOLOGY

## 2018-12-07 NOTE — PROGRESS NOTES
Subjective:       Patient ID: Bonifacio Peoples Jr. is a 68 y.o. male.    Chief Complaint: Otalgia    HPI: Hx of L Genil for mos.    Comes and goes.    No HL.    Rx's with qtts , has some malocclusion.    Past Medical History: Patient has a past medical history of Anticoagulant long-term use, Arthritis, Asthma, mild intermittent, well-controlled, Cataract, Depression, Diabetes mellitus, High cholesterol, Hypertension, Respiratory distress, Stroke, TIA (transient ischemic attack), and Type 2 diabetes mellitus.    Past Surgical History: Patient has a past surgical history that includes Knee arthroscopy w/ laser (Right); Cardiac surgery (2010, 2008); Tonsillectomy; Cystoscopy w/ laser lithotripsy; CYSTOSCOPY, WITH RETROGRADE PYELOGRAM AND URETERAL STENT INSERTION (Bilateral, 2/28/2013); MANIPULATION, CALCULUS (Right, 2/28/2013); CYSTOSCOPY, WITH RETROGRADE PYELOGRAM AND URETERAL STENT INSERTION (Right, 2/14/2013); MANIPULATION, CALCULUS (Right, 2/14/2013); LITHOTRIPSY, USING LASER (Right, 2/14/2013); PYELOGRAM, RETROGRADE (Bilateral, 2/14/2013); FLUOROSCOPY (N/A, 2/14/2013); DILATION, URETER, USING BALLOON (Right, 2/14/2013); INSERTION, CATHETER (2/14/2013); and LITHOTRIPSY, ESWL (Right, 1/14/2013).    Social History: Patient reports that  has never smoked. he has never used smokeless tobacco. He reports that he drinks alcohol. He reports that he does not use drugs.    Family History: family history includes Diabetes in his mother and sister; Heart attack in his sister; Heart disease in his paternal grandfather; No Known Problems in his father.    Medications:   Current Outpatient Medications   Medication Sig    ACCU-CHEK JASON PLUS METER Misc USE AS INSTRUCTED    albuterol (PROVENTIL) 2.5 mg /3 mL (0.083 %) nebulizer solution Take 3 mLs (2.5 mg total) by nebulization every 6 (six) hours as needed for Wheezing. Rescue    albuterol 90 mcg/actuation inhaler Inhale 2 puffs into the lungs every 6 (six) hours as needed for  Wheezing or Shortness of Breath. Rescue    aspirin (ECOTRIN) 81 MG EC tablet Take 81 mg by mouth after dinner. .    azelastine (ASTELIN) 137 mcg (0.1 %) nasal spray 1 spray (137 mcg total) by Nasal route 2 (two) times daily.    bisoprolol-hydrochlorothiazide 2.5-6.25 mg (ZIAC) 2.5-6.25 mg Tab TAKE 1 TABLET EVERY MORNING    blood sugar diagnostic Strp 100 strips by Misc.(Non-Drug; Combo Route) route once daily.    citalopram (CELEXA) 40 MG tablet TAKE 1 TABLET EVERY MORNING    gabapentin (NEURONTIN) 100 MG capsule TAKE 1 CAPSULE THREE TIMES DAILY    lancets (ACCU-CHEK SOFTCLIX LANCETS) Misc 1 Box by Misc.(Non-Drug; Combo Route) route once daily.    lisinopril (PRINIVIL,ZESTRIL) 2.5 MG tablet TAKE ONE TABLET BY MOUTH EVERY DAY    meclizine (ANTIVERT) 25 mg tablet Take 1 tablet (25 mg total) by mouth 3 (three) times daily as needed.    metFORMIN (GLUCOPHAGE-XR) 500 MG 24 hr tablet TAKE ONE TABLET BY MOUTH DAILY WITH FOOD    ofloxacin (FLOXIN) 0.3 % otic solution Place 10 drops into the left ear once daily. for 7 days    SYMBICORT 160-4.5 mcg/actuation AA      No current facility-administered medications for this visit.        Allergies: Patient is allergic to codeine; pcn [penicillins]; zyrtec [cetirizine]; and iodine and iodide containing products.      Review of Systems   HENT: Positive for ear pain.        Objective:      Physical Exam   Constitutional: He is oriented to person, place, and time. He appears well-developed and well-nourished. He is cooperative.  Non-toxic appearance. He does not have a sickly appearance. He does not appear ill. No distress.   HENT:   Head: Normocephalic and atraumatic. Not macrocephalic and not microcephalic. Head is without raccoon's eyes, without Monson's sign, without abrasion, without contusion, without laceration, without right periorbital erythema and without left periorbital erythema. Hair is normal.   Right Ear: Ear canal normal. No lacerations. No drainage,  swelling or tenderness. No foreign bodies. No mastoid tenderness. Tympanic membrane is not injected, not scarred, not perforated, not erythematous, not retracted and not bulging. Tympanic membrane mobility is normal. No middle ear effusion. No hemotympanum. No decreased hearing is noted.   Left Ear: Ear canal normal. No lacerations. No drainage, swelling or tenderness. No foreign bodies. No mastoid tenderness. Tympanic membrane is not injected, not scarred, not perforated, not erythematous, not retracted and not bulging. Tympanic membrane mobility is normal.  No middle ear effusion. No hemotympanum. No decreased hearing is noted.   Nose: No mucosal edema, rhinorrhea, nose lacerations, sinus tenderness, nasal deformity, septal deviation or nasal septal hematoma. No epistaxis.  No foreign bodies. Right sinus exhibits no maxillary sinus tenderness. Left sinus exhibits no maxillary sinus tenderness.       Has B Scant CI.      L TMJ ++ tender.         Eyes: Conjunctivae, EOM and lids are normal. Pupils are equal, round, and reactive to light.   Neck: Normal range of motion. Neck supple. No JVD present. No tracheal tenderness and no muscular tenderness present. No neck rigidity. No tracheal deviation, no edema, no erythema and normal range of motion present. No thyroid mass and no thyromegaly present.   Cardiovascular: Normal rate and regular rhythm.   Pulmonary/Chest: Effort normal. No accessory muscle usage or stridor. No apnea, no tachypnea and no bradypnea. No respiratory distress.   Abdominal: Soft. Normal appearance.   Musculoskeletal: Normal range of motion.   Lymphadenopathy:        Head (right side): No submental, no submandibular, no tonsillar, no preauricular and no posterior auricular adenopathy present.        Head (left side): No submental, no submandibular, no tonsillar, no preauricular and no posterior auricular adenopathy present.     He has no cervical adenopathy.        Right cervical: No superficial  cervical, no deep cervical and no posterior cervical adenopathy present.       Left cervical: No superficial cervical, no deep cervical and no posterior cervical adenopathy present.   Neurological: He is alert and oriented to person, place, and time. He displays no atrophy and no tremor. No cranial nerve deficit or sensory deficit. He exhibits normal muscle tone. He displays no seizure activity.   Skin: Skin is warm, dry and intact. No abrasion, no bruising, no burn, no ecchymosis, no laceration, no lesion and no rash noted. He is not diaphoretic. No erythema. No pallor.   Psychiatric: He has a normal mood and affect. His behavior is normal. Judgment and thought content normal. His speech is not rapid and/or pressured and not slurred. Cognition and memory are normal. He is communicative.   Nursing note and vitals reviewed.          Assessment:       1. Left ear pain    2. Temporomandibular joint dysfunction        Plan:         TMJ regimen with soft diet, NSAID's, Heating pad over joint for 20 minutes tid for 7-10 days. If no better consider re evaluation for use of muscle relaxants and or referral to Oral Surgery specialist.

## 2018-12-07 NOTE — LETTER
December 7, 2018      Justine Win MD  [address] [address]  [address]  [place] [address]           [facility] - Otorhinolaryngology  [address]  [place] [address]  Phone: [phone]  Fax: [phone]          Patient: Bonifacio Peoples Jr.   MR Number: [id number]   YOB: 1950   Date of Visit: 12/7/2018       Dear Dr. Justine Win:    Thank you for referring Bonifacio Peoples to me for evaluation. Attached you will find relevant portions of my assessment and plan of care.    If you have questions, please do not hesitate to call me. I look forward to following Bonifacio Peoples along with you.    Sincerely,    Wisam Davis MD    Enclosure  CC:  No Recipients    If you would like to receive this communication electronically, please contact [email or web address] or [phone] to request more information on [employer] Link access.    For providers and/or their staff who would like to refer a patient to [facility], please contact us through our one-stop-shop provider referral line, [facility], [place], at [phone].    If you feel you have received this communication in error or would no longer like to receive these types of communications, please e-mail [email or web address]

## 2019-01-01 ENCOUNTER — LAB VISIT (OUTPATIENT)
Dept: LAB | Facility: HOSPITAL | Age: 69
End: 2019-01-01
Payer: MEDICARE

## 2019-01-01 ENCOUNTER — OFFICE VISIT (OUTPATIENT)
Dept: FAMILY MEDICINE | Facility: CLINIC | Age: 69
End: 2019-01-01
Payer: MEDICARE

## 2019-01-01 ENCOUNTER — TELEPHONE (OUTPATIENT)
Dept: FAMILY MEDICINE | Facility: CLINIC | Age: 69
End: 2019-01-01

## 2019-01-01 ENCOUNTER — PES CALL (OUTPATIENT)
Dept: ADMINISTRATIVE | Facility: CLINIC | Age: 69
End: 2019-01-01

## 2019-01-01 ENCOUNTER — LAB VISIT (OUTPATIENT)
Dept: LAB | Facility: HOSPITAL | Age: 69
End: 2019-01-01
Attending: INTERNAL MEDICINE
Payer: MEDICARE

## 2019-01-01 VITALS
BODY MASS INDEX: 33.79 KG/M2 | WEIGHT: 239 LBS | TEMPERATURE: 98 F | OXYGEN SATURATION: 96 % | HEART RATE: 56 BPM | SYSTOLIC BLOOD PRESSURE: 116 MMHG | DIASTOLIC BLOOD PRESSURE: 66 MMHG

## 2019-01-01 VITALS
HEIGHT: 71 IN | BODY MASS INDEX: 34.23 KG/M2 | TEMPERATURE: 98 F | OXYGEN SATURATION: 96 % | WEIGHT: 244.5 LBS | SYSTOLIC BLOOD PRESSURE: 122 MMHG | RESPIRATION RATE: 18 BRPM | HEART RATE: 58 BPM | DIASTOLIC BLOOD PRESSURE: 80 MMHG

## 2019-01-01 VITALS
HEART RATE: 57 BPM | BODY MASS INDEX: 33 KG/M2 | SYSTOLIC BLOOD PRESSURE: 99 MMHG | HEIGHT: 71 IN | TEMPERATURE: 98 F | RESPIRATION RATE: 17 BRPM | DIASTOLIC BLOOD PRESSURE: 70 MMHG | OXYGEN SATURATION: 97 % | WEIGHT: 235.69 LBS

## 2019-01-01 DIAGNOSIS — E78.2 MIXED HYPERLIPIDEMIA: ICD-10-CM

## 2019-01-01 DIAGNOSIS — K21.9 GASTROESOPHAGEAL REFLUX DISEASE, ESOPHAGITIS PRESENCE NOT SPECIFIED: ICD-10-CM

## 2019-01-01 DIAGNOSIS — R53.83 FATIGUE, UNSPECIFIED TYPE: ICD-10-CM

## 2019-01-01 DIAGNOSIS — E11.40 TYPE 2 DIABETES MELLITUS WITH DIABETIC NEUROPATHY, WITHOUT LONG-TERM CURRENT USE OF INSULIN: ICD-10-CM

## 2019-01-01 DIAGNOSIS — D53.9 NUTRITIONAL ANEMIA: ICD-10-CM

## 2019-01-01 DIAGNOSIS — K21.9 GASTROESOPHAGEAL REFLUX DISEASE, ESOPHAGITIS PRESENCE NOT SPECIFIED: Primary | ICD-10-CM

## 2019-01-01 DIAGNOSIS — Z12.11 COLON CANCER SCREENING: ICD-10-CM

## 2019-01-01 DIAGNOSIS — Z23 NEEDS FLU SHOT: ICD-10-CM

## 2019-01-01 DIAGNOSIS — Z00.00 ANNUAL PHYSICAL EXAM: Primary | ICD-10-CM

## 2019-01-01 DIAGNOSIS — E11.40 TYPE 2 DIABETES MELLITUS WITH DIABETIC NEUROPATHY, WITHOUT LONG-TERM CURRENT USE OF INSULIN: Primary | ICD-10-CM

## 2019-01-01 DIAGNOSIS — M17.31 POST-TRAUMATIC OSTEOARTHRITIS OF RIGHT KNEE: Primary | ICD-10-CM

## 2019-01-01 DIAGNOSIS — E11.9 TYPE 2 DIABETES MELLITUS WITHOUT COMPLICATION: ICD-10-CM

## 2019-01-01 DIAGNOSIS — J45.21 MILD INTERMITTENT REACTIVE AIRWAY DISEASE WITH WHEEZING WITH ACUTE EXACERBATION: ICD-10-CM

## 2019-01-01 DIAGNOSIS — F39 MOOD DISORDER: ICD-10-CM

## 2019-01-01 DIAGNOSIS — I10 ESSENTIAL HYPERTENSION: ICD-10-CM

## 2019-01-01 DIAGNOSIS — E11.69 HYPERLIPIDEMIA ASSOCIATED WITH TYPE 2 DIABETES MELLITUS: ICD-10-CM

## 2019-01-01 DIAGNOSIS — E11.9 TYPE 2 DIABETES MELLITUS WITHOUT COMPLICATION, UNSPECIFIED WHETHER LONG TERM INSULIN USE: ICD-10-CM

## 2019-01-01 DIAGNOSIS — E78.5 HYPERLIPIDEMIA ASSOCIATED WITH TYPE 2 DIABETES MELLITUS: ICD-10-CM

## 2019-01-01 DIAGNOSIS — R07.9 CHEST PAIN OF UNCERTAIN ETIOLOGY: ICD-10-CM

## 2019-01-01 LAB
25(OH)D3+25(OH)D2 SERPL-MCNC: 35 NG/ML (ref 30–96)
ALBUMIN SERPL BCP-MCNC: 3.9 G/DL (ref 3.5–5.2)
ALBUMIN SERPL BCP-MCNC: 4.2 G/DL (ref 3.5–5.2)
ALBUMIN/CREAT UR: 11 UG/MG (ref 0–30)
ALP SERPL-CCNC: 63 U/L (ref 55–135)
ALP SERPL-CCNC: 97 U/L (ref 55–135)
ALT SERPL W/O P-5'-P-CCNC: 27 U/L (ref 10–44)
ALT SERPL W/O P-5'-P-CCNC: 28 U/L (ref 10–44)
ANION GAP SERPL CALC-SCNC: 8 MMOL/L (ref 8–16)
ANION GAP SERPL CALC-SCNC: 9 MMOL/L (ref 8–16)
AST SERPL-CCNC: 23 U/L (ref 10–40)
AST SERPL-CCNC: 25 U/L (ref 10–40)
BASOPHILS # BLD AUTO: 0.03 K/UL (ref 0–0.2)
BASOPHILS # BLD AUTO: 0.07 K/UL (ref 0–0.2)
BASOPHILS NFR BLD: 0.4 % (ref 0–1.9)
BASOPHILS NFR BLD: 0.8 % (ref 0–1.9)
BILIRUB SERPL-MCNC: 0.6 MG/DL (ref 0.1–1)
BILIRUB SERPL-MCNC: 0.6 MG/DL (ref 0.1–1)
BUN SERPL-MCNC: 15 MG/DL (ref 8–23)
BUN SERPL-MCNC: 16 MG/DL (ref 8–23)
CALCIUM SERPL-MCNC: 9.6 MG/DL (ref 8.7–10.5)
CALCIUM SERPL-MCNC: 9.8 MG/DL (ref 8.7–10.5)
CHLORIDE SERPL-SCNC: 101 MMOL/L (ref 95–110)
CHLORIDE SERPL-SCNC: 102 MMOL/L (ref 95–110)
CHOLEST SERPL-MCNC: 248 MG/DL (ref 120–199)
CHOLEST/HDLC SERPL: 8.9 {RATIO} (ref 2–5)
CO2 SERPL-SCNC: 27 MMOL/L (ref 23–29)
CO2 SERPL-SCNC: 28 MMOL/L (ref 23–29)
CREAT SERPL-MCNC: 1.2 MG/DL (ref 0.5–1.4)
CREAT SERPL-MCNC: 1.3 MG/DL (ref 0.5–1.4)
CREAT UR-MCNC: 145 MG/DL (ref 23–375)
DIFFERENTIAL METHOD: ABNORMAL
DIFFERENTIAL METHOD: ABNORMAL
EOSINOPHIL # BLD AUTO: 0.2 K/UL (ref 0–0.5)
EOSINOPHIL # BLD AUTO: 0.3 K/UL (ref 0–0.5)
EOSINOPHIL NFR BLD: 2.9 % (ref 0–8)
EOSINOPHIL NFR BLD: 3.9 % (ref 0–8)
ERYTHROCYTE [DISTWIDTH] IN BLOOD BY AUTOMATED COUNT: 12.4 % (ref 11.5–14.5)
ERYTHROCYTE [DISTWIDTH] IN BLOOD BY AUTOMATED COUNT: 12.6 % (ref 11.5–14.5)
EST. GFR  (AFRICAN AMERICAN): >60 ML/MIN/1.73 M^2
EST. GFR  (AFRICAN AMERICAN): >60 ML/MIN/1.73 M^2
EST. GFR  (NON AFRICAN AMERICAN): 56 ML/MIN/1.73 M^2
EST. GFR  (NON AFRICAN AMERICAN): >60 ML/MIN/1.73 M^2
ESTIMATED AVG GLUCOSE: 146 MG/DL (ref 68–131)
ESTIMATED AVG GLUCOSE: 154 MG/DL (ref 68–131)
FOLATE SERPL-MCNC: 9.9 NG/ML (ref 4–24)
GLUCOSE SERPL-MCNC: 145 MG/DL (ref 70–110)
GLUCOSE SERPL-MCNC: 182 MG/DL (ref 70–110)
HBA1C MFR BLD HPLC: 6.7 % (ref 4–5.6)
HBA1C MFR BLD HPLC: 7 % (ref 4–5.6)
HCT VFR BLD AUTO: 50.8 % (ref 40–54)
HCT VFR BLD AUTO: 51 % (ref 40–54)
HDLC SERPL-MCNC: 28 MG/DL (ref 40–75)
HDLC SERPL: 11.3 % (ref 20–50)
HEMOCCULT STL QL IA: NEGATIVE
HGB BLD-MCNC: 16.9 G/DL (ref 14–18)
HGB BLD-MCNC: 17 G/DL (ref 14–18)
IMM GRANULOCYTES # BLD AUTO: 0.03 K/UL (ref 0–0.04)
IMM GRANULOCYTES NFR BLD AUTO: 0.4 % (ref 0–0.5)
LDLC SERPL CALC-MCNC: 174.2 MG/DL (ref 63–159)
LYMPHOCYTES # BLD AUTO: 1.9 K/UL (ref 1–4.8)
LYMPHOCYTES # BLD AUTO: 1.9 K/UL (ref 1–4.8)
LYMPHOCYTES NFR BLD: 22.4 % (ref 18–48)
LYMPHOCYTES NFR BLD: 23.1 % (ref 18–48)
MCH RBC QN AUTO: 30.6 PG (ref 27–31)
MCH RBC QN AUTO: 31 PG (ref 27–31)
MCHC RBC AUTO-ENTMCNC: 33.3 G/DL (ref 32–36)
MCHC RBC AUTO-ENTMCNC: 33.3 G/DL (ref 32–36)
MCV RBC AUTO: 92 FL (ref 82–98)
MCV RBC AUTO: 93 FL (ref 82–98)
MICROALBUMIN UR DL<=1MG/L-MCNC: 16 UG/ML
MONOCYTES # BLD AUTO: 1.1 K/UL (ref 0.3–1)
MONOCYTES # BLD AUTO: 1.1 K/UL (ref 0.3–1)
MONOCYTES NFR BLD: 12.4 % (ref 4–15)
MONOCYTES NFR BLD: 12.9 % (ref 4–15)
NEUTROPHILS # BLD AUTO: 5.1 K/UL (ref 1.8–7.7)
NEUTROPHILS # BLD AUTO: 5.1 K/UL (ref 1.8–7.7)
NEUTROPHILS NFR BLD: 60.1 % (ref 38–73)
NEUTROPHILS NFR BLD: 60.7 % (ref 38–73)
NONHDLC SERPL-MCNC: 220 MG/DL
NRBC BLD-RTO: 0 /100 WBC
PLATELET # BLD AUTO: 182 K/UL (ref 150–350)
PLATELET # BLD AUTO: 182 K/UL (ref 150–350)
PMV BLD AUTO: 10.8 FL (ref 9.2–12.9)
PMV BLD AUTO: 10.8 FL (ref 9.2–12.9)
POTASSIUM SERPL-SCNC: 4.3 MMOL/L (ref 3.5–5.1)
POTASSIUM SERPL-SCNC: 4.4 MMOL/L (ref 3.5–5.1)
PROT SERPL-MCNC: 7.2 G/DL (ref 6–8.4)
PROT SERPL-MCNC: 7.4 G/DL (ref 6–8.4)
RBC # BLD AUTO: 5.48 M/UL (ref 4.6–6.2)
RBC # BLD AUTO: 5.52 M/UL (ref 4.6–6.2)
SODIUM SERPL-SCNC: 137 MMOL/L (ref 136–145)
SODIUM SERPL-SCNC: 138 MMOL/L (ref 136–145)
TRIGL SERPL-MCNC: 229 MG/DL (ref 30–150)
TSH SERPL DL<=0.005 MIU/L-ACNC: 1.26 UIU/ML (ref 0.4–4)
VIT B12 SERPL-MCNC: 388 PG/ML (ref 210–950)
WBC # BLD AUTO: 8.35 K/UL (ref 3.9–12.7)
WBC # BLD AUTO: 8.49 K/UL (ref 3.9–12.7)

## 2019-01-01 PROCEDURE — 99499 UNLISTED E&M SERVICE: CPT | Mod: HCNC,S$GLB,, | Performed by: INTERNAL MEDICINE

## 2019-01-01 PROCEDURE — 82306 VITAMIN D 25 HYDROXY: CPT | Mod: HCNC

## 2019-01-01 PROCEDURE — 99999 PR PBB SHADOW E&M-EST. PATIENT-LVL III: CPT | Mod: PBBFAC,HCNC,, | Performed by: INTERNAL MEDICINE

## 2019-01-01 PROCEDURE — 99999 PR PBB SHADOW E&M-EST. PATIENT-LVL IV: CPT | Mod: PBBFAC,HCNC,, | Performed by: INTERNAL MEDICINE

## 2019-01-01 PROCEDURE — 99214 PR OFFICE/OUTPT VISIT, EST, LEVL IV, 30-39 MIN: ICD-10-PCS | Mod: HCNC,S$GLB,, | Performed by: INTERNAL MEDICINE

## 2019-01-01 PROCEDURE — G0008 ADMIN INFLUENZA VIRUS VAC: HCPCS | Mod: HCNC,S$GLB,, | Performed by: INTERNAL MEDICINE

## 2019-01-01 PROCEDURE — 3074F PR MOST RECENT SYSTOLIC BLOOD PRESSURE < 130 MM HG: ICD-10-PCS | Mod: HCNC,CPTII,S$GLB, | Performed by: INTERNAL MEDICINE

## 2019-01-01 PROCEDURE — 82274 ASSAY TEST FOR BLOOD FECAL: CPT | Mod: HCNC

## 2019-01-01 PROCEDURE — 3044F HG A1C LEVEL LT 7.0%: CPT | Mod: HCNC,CPTII,S$GLB, | Performed by: INTERNAL MEDICINE

## 2019-01-01 PROCEDURE — 90662 FLU VACCINE - HIGH DOSE (65+) PRESERVATIVE FREE IM: ICD-10-PCS | Mod: HCNC,S$GLB,, | Performed by: INTERNAL MEDICINE

## 2019-01-01 PROCEDURE — 99397 PR PREVENTIVE VISIT,EST,65 & OVER: ICD-10-PCS | Mod: 25,HCNC,S$GLB, | Performed by: INTERNAL MEDICINE

## 2019-01-01 PROCEDURE — 99999 PR PBB SHADOW E&M-EST. PATIENT-LVL III: ICD-10-PCS | Mod: PBBFAC,HCNC,, | Performed by: INTERNAL MEDICINE

## 2019-01-01 PROCEDURE — 3078F DIAST BP <80 MM HG: CPT | Mod: HCNC,CPTII,S$GLB, | Performed by: INTERNAL MEDICINE

## 2019-01-01 PROCEDURE — 93010 ELECTROCARDIOGRAM REPORT: CPT | Mod: HCNC,S$GLB,, | Performed by: INTERNAL MEDICINE

## 2019-01-01 PROCEDURE — 82746 ASSAY OF FOLIC ACID SERUM: CPT | Mod: HCNC

## 2019-01-01 PROCEDURE — 1101F PT FALLS ASSESS-DOCD LE1/YR: CPT | Mod: HCNC,CPTII,S$GLB, | Performed by: INTERNAL MEDICINE

## 2019-01-01 PROCEDURE — 3079F DIAST BP 80-89 MM HG: CPT | Mod: HCNC,CPTII,S$GLB, | Performed by: INTERNAL MEDICINE

## 2019-01-01 PROCEDURE — 99214 OFFICE O/P EST MOD 30 MIN: CPT | Mod: HCNC,S$GLB,, | Performed by: INTERNAL MEDICINE

## 2019-01-01 PROCEDURE — 82607 VITAMIN B-12: CPT | Mod: HCNC

## 2019-01-01 PROCEDURE — 85025 COMPLETE CBC W/AUTO DIFF WBC: CPT | Mod: HCNC

## 2019-01-01 PROCEDURE — 3074F SYST BP LT 130 MM HG: CPT | Mod: HCNC,CPTII,S$GLB, | Performed by: INTERNAL MEDICINE

## 2019-01-01 PROCEDURE — 99397 PER PM REEVAL EST PAT 65+ YR: CPT | Mod: 25,HCNC,S$GLB, | Performed by: INTERNAL MEDICINE

## 2019-01-01 PROCEDURE — 93005 ELECTROCARDIOGRAM TRACING: CPT | Mod: HCNC,S$GLB,, | Performed by: INTERNAL MEDICINE

## 2019-01-01 PROCEDURE — 83036 HEMOGLOBIN GLYCOSYLATED A1C: CPT | Mod: HCNC

## 2019-01-01 PROCEDURE — 93010 EKG 12-LEAD: ICD-10-PCS | Mod: HCNC,S$GLB,, | Performed by: INTERNAL MEDICINE

## 2019-01-01 PROCEDURE — 3078F PR MOST RECENT DIASTOLIC BLOOD PRESSURE < 80 MM HG: ICD-10-PCS | Mod: HCNC,CPTII,S$GLB, | Performed by: INTERNAL MEDICINE

## 2019-01-01 PROCEDURE — 3079F PR MOST RECENT DIASTOLIC BLOOD PRESSURE 80-89 MM HG: ICD-10-PCS | Mod: HCNC,CPTII,S$GLB, | Performed by: INTERNAL MEDICINE

## 2019-01-01 PROCEDURE — 80053 COMPREHEN METABOLIC PANEL: CPT | Mod: HCNC

## 2019-01-01 PROCEDURE — 36415 COLL VENOUS BLD VENIPUNCTURE: CPT | Mod: HCNC,PO

## 2019-01-01 PROCEDURE — 99999 PR PBB SHADOW E&M-EST. PATIENT-LVL IV: ICD-10-PCS | Mod: PBBFAC,HCNC,, | Performed by: INTERNAL MEDICINE

## 2019-01-01 PROCEDURE — 80061 LIPID PANEL: CPT | Mod: HCNC

## 2019-01-01 PROCEDURE — 1101F PR PT FALLS ASSESS DOC 0-1 FALLS W/OUT INJ PAST YR: ICD-10-PCS | Mod: HCNC,CPTII,S$GLB, | Performed by: INTERNAL MEDICINE

## 2019-01-01 PROCEDURE — G0008 FLU VACCINE - HIGH DOSE (65+) PRESERVATIVE FREE IM: ICD-10-PCS | Mod: HCNC,S$GLB,, | Performed by: INTERNAL MEDICINE

## 2019-01-01 PROCEDURE — 82043 UR ALBUMIN QUANTITATIVE: CPT | Mod: HCNC

## 2019-01-01 PROCEDURE — 90662 IIV NO PRSV INCREASED AG IM: CPT | Mod: HCNC,S$GLB,, | Performed by: INTERNAL MEDICINE

## 2019-01-01 PROCEDURE — 99499 RISK ADDL DX/OHS AUDIT: ICD-10-PCS | Mod: HCNC,S$GLB,, | Performed by: INTERNAL MEDICINE

## 2019-01-01 PROCEDURE — 84443 ASSAY THYROID STIM HORMONE: CPT | Mod: HCNC

## 2019-01-01 PROCEDURE — 3044F PR MOST RECENT HEMOGLOBIN A1C LEVEL <7.0%: ICD-10-PCS | Mod: HCNC,CPTII,S$GLB, | Performed by: INTERNAL MEDICINE

## 2019-01-01 PROCEDURE — 93005 EKG 12-LEAD: ICD-10-PCS | Mod: HCNC,S$GLB,, | Performed by: INTERNAL MEDICINE

## 2019-01-01 RX ORDER — CITALOPRAM 40 MG/1
TABLET, FILM COATED ORAL
Qty: 90 TABLET | Refills: 1 | Status: SHIPPED | OUTPATIENT
Start: 2019-01-01

## 2019-01-01 RX ORDER — LISINOPRIL 2.5 MG/1
2.5 TABLET ORAL DAILY
Qty: 90 TABLET | Refills: 1 | Status: SHIPPED | OUTPATIENT
Start: 2019-01-01 | End: 2019-01-01 | Stop reason: SDUPTHER

## 2019-01-01 RX ORDER — OXYCODONE AND ACETAMINOPHEN 5; 325 MG/1; MG/1
TABLET ORAL
COMMUNITY
Start: 2019-01-01 | End: 2019-01-01

## 2019-01-01 RX ORDER — METFORMIN HYDROCHLORIDE 500 MG/1
TABLET, EXTENDED RELEASE ORAL
Qty: 180 TABLET | Refills: 1
Start: 2019-01-01 | End: 2019-01-01 | Stop reason: SDUPTHER

## 2019-01-01 RX ORDER — BISOPROLOL FUMARATE AND HYDROCHLOROTHIAZIDE 2.5; 6.25 MG/1; MG/1
1 TABLET ORAL EVERY MORNING
Qty: 90 TABLET | Refills: 1 | Status: SHIPPED | OUTPATIENT
Start: 2019-01-01 | End: 2019-01-01 | Stop reason: SDUPTHER

## 2019-01-01 RX ORDER — ALBUTEROL SULFATE 90 UG/1
2 AEROSOL, METERED RESPIRATORY (INHALATION) EVERY 6 HOURS PRN
Qty: 18 G | Refills: 3 | Status: SHIPPED | OUTPATIENT
Start: 2019-01-01 | End: 2020-01-01

## 2019-01-01 RX ORDER — ATORVASTATIN CALCIUM 40 MG/1
40 TABLET, FILM COATED ORAL DAILY
Qty: 90 TABLET | Refills: 3 | Status: SHIPPED | OUTPATIENT
Start: 2019-01-01 | End: 2020-10-31

## 2019-01-01 RX ORDER — METFORMIN HYDROCHLORIDE 500 MG/1
TABLET, EXTENDED RELEASE ORAL
Qty: 180 TABLET | Refills: 0 | Status: SHIPPED | OUTPATIENT
Start: 2019-01-01 | End: 2019-01-01 | Stop reason: SDUPTHER

## 2019-01-01 RX ORDER — BUDESONIDE AND FORMOTEROL FUMARATE DIHYDRATE 160; 4.5 UG/1; UG/1
2 AEROSOL RESPIRATORY (INHALATION) EVERY 12 HOURS
Qty: 10.2 G | Refills: 5 | Status: SHIPPED | OUTPATIENT
Start: 2019-01-01

## 2019-01-01 RX ORDER — LISINOPRIL 2.5 MG/1
2.5 TABLET ORAL DAILY
Qty: 90 TABLET | Refills: 1 | Status: SHIPPED | OUTPATIENT
Start: 2019-01-01

## 2019-01-01 RX ORDER — ATORVASTATIN CALCIUM 40 MG/1
40 TABLET, FILM COATED ORAL DAILY
Qty: 90 TABLET | Refills: 3 | Status: SHIPPED | OUTPATIENT
Start: 2019-01-01 | End: 2019-01-01 | Stop reason: SDUPTHER

## 2019-01-01 RX ORDER — PIOGLITAZONEHYDROCHLORIDE 30 MG/1
30 TABLET ORAL DAILY
Qty: 90 TABLET | Refills: 0 | Status: SHIPPED | OUTPATIENT
Start: 2019-01-01 | End: 2020-11-03

## 2019-01-01 RX ORDER — CITALOPRAM 40 MG/1
TABLET, FILM COATED ORAL
Qty: 90 TABLET | Refills: 1 | Status: SHIPPED | OUTPATIENT
Start: 2019-01-01 | End: 2019-01-01 | Stop reason: SDUPTHER

## 2019-01-01 RX ORDER — METFORMIN HYDROCHLORIDE 500 MG/1
500 TABLET, EXTENDED RELEASE ORAL 2 TIMES DAILY WITH MEALS
Qty: 180 TABLET | Refills: 1 | Status: SHIPPED | OUTPATIENT
Start: 2019-01-01 | End: 2020-01-01 | Stop reason: ALTCHOICE

## 2019-01-01 RX ORDER — BISOPROLOL FUMARATE AND HYDROCHLOROTHIAZIDE 2.5; 6.25 MG/1; MG/1
1 TABLET ORAL EVERY MORNING
Qty: 90 TABLET | Refills: 1 | Status: SHIPPED | OUTPATIENT
Start: 2019-01-01

## 2019-01-01 RX ORDER — LANCETS
1 EACH MISCELLANEOUS DAILY
Qty: 100 EACH | Refills: 3 | Status: SHIPPED | OUTPATIENT
Start: 2019-01-01

## 2019-01-01 RX ORDER — GABAPENTIN 300 MG/1
300 CAPSULE ORAL 3 TIMES DAILY
Qty: 270 CAPSULE | Refills: 1 | Status: SHIPPED | OUTPATIENT
Start: 2019-01-01 | End: 2020-04-21

## 2019-01-01 RX ORDER — METFORMIN HYDROCHLORIDE 500 MG/1
TABLET, EXTENDED RELEASE ORAL
Qty: 90 TABLET | Refills: 1 | Status: SHIPPED | OUTPATIENT
Start: 2019-01-01 | End: 2019-01-01

## 2019-01-14 NOTE — TELEPHONE ENCOUNTER
No Answer, No Voicemail for patient to return call to clinic to schedule referral    
No Answer, Unable to leave message and mailing letter for patient to return call to clinic to schedule referral    
no

## 2019-01-28 DIAGNOSIS — I10 ESSENTIAL HYPERTENSION: ICD-10-CM

## 2019-01-28 RX ORDER — CITALOPRAM 40 MG/1
TABLET, FILM COATED ORAL
Qty: 90 TABLET | Refills: 1 | Status: SHIPPED | OUTPATIENT
Start: 2019-01-28 | End: 2019-01-01 | Stop reason: SDUPTHER

## 2019-01-28 RX ORDER — BISOPROLOL FUMARATE AND HYDROCHLOROTHIAZIDE 2.5; 6.25 MG/1; MG/1
TABLET ORAL
Qty: 90 TABLET | Refills: 1 | Status: SHIPPED | OUTPATIENT
Start: 2019-01-28 | End: 2019-01-01 | Stop reason: SDUPTHER

## 2019-02-15 DIAGNOSIS — E11.9 TYPE 2 DIABETES MELLITUS WITHOUT COMPLICATION: ICD-10-CM

## 2019-03-15 LAB
LEFT EYE DM RETINOPATHY: NEGATIVE
RIGHT EYE DM RETINOPATHY: NEGATIVE

## 2019-04-22 PROBLEM — E11.9 TYPE 2 DIABETES MELLITUS WITHOUT COMPLICATION, WITHOUT LONG-TERM CURRENT USE OF INSULIN: Status: RESOLVED | Noted: 2017-05-03 | Resolved: 2019-01-01

## 2019-04-22 NOTE — PROGRESS NOTES
SUBJECTIVE     Chief Complaint   Patient presents with    Follow-up       HPI  Bonifacio Peoples Jr. is a 69 y.o. male with multiple medical diagnoses as listed in the medical history and problem list that presents for evaluation of B/L feet x 1.5 months and chronic R knee pain x 1 year. Pt reports pain is as if he's being stuck with a needle at the heel of his feet and around the ankle at a 8.5/10 and constant in nature without radiation. Pain is improved with buying new tennis shoes. Pain is worse with walking.     Knee pain- Pain is like someone pulling his teeth at an 11/10 and constant in nature at the lateral knee. Pt has been taking Tylenol and Ibuprofen with some improvement from the Ibuprofen.     PAST MEDICAL HISTORY:  Past Medical History:   Diagnosis Date    Anticoagulant long-term use     Arthritis     Rt and Lt wrist    Asthma, mild intermittent, well-controlled     Cataract     Depression     Diabetes mellitus     High cholesterol     Hypertension     Respiratory distress     Had to give a breathing treatment during last cysto procedure  2/10/12    Stroke     TIA  x3    TIA (transient ischemic attack)     Type 2 diabetes mellitus        PAST SURGICAL HISTORY:  Past Surgical History:   Procedure Laterality Date    CARDIAC SURGERY  2010, 2008    repair openings to heart    CYSTOSCOPY W/ LASER LITHOTRIPSY      2/2012 unsuccessful with stone removal. Ureteral stent placed    CYSTOSCOPY, WITH RETROGRADE PYELOGRAM AND URETERAL STENT INSERTION Bilateral 2/28/2013    [facility]    CYSTOSCOPY, WITH RETROGRADE PYELOGRAM AND URETERAL STENT INSERTION Right 2/14/2013    [facility]    DILATION, URETER, USING BALLOON Right 2/14/2013    Performed [facility]    FLUOROSCOPY N/A 2/14/2013    [facility]    INSERTION, CATHETER  2/14/2013    Performed by [facility]ERA  [facility]    KNEE ARTHROSCOPY W/ LASER Right     x 2    LITHOTRIPSY, ESWL Right 1/14/2013    [facility]    LITHOTRIPSY, USING LASER Right 2/14/2013    [facility]    MANIPULATION, CALCULUS Right 2/28/2013    [facility]    MANIPULATION, CALCULUS Right 2/14/2013    [facility]    PYELOGRAM, RETROGRADE Bilateral 2/14/2013    [facility]    TONSILLECTOMY      as a child       SOCIAL HISTORY:  Social History     Socioeconomic History    Marital status:      Spouse name: Not on file    Number of children: Not on file    Years of education: Not on file    Highest education level: Not on file   Occupational History    Not on file   Social Needs    Financial resource strain: Not on file    Food insecurity:     Worry: Not on file     Inability: Not on file    Transportation needs:     Medical: Not on file     Non-medical: Not on file   Tobacco Use    Smoking status: Never Smoker    Smokeless tobacco: Never Used   Substance and Sexual Activity    Alcohol use: Yes     Comment: occassionally every 2-3 months     Drug use: No    Sexual activity: Yes     Partners: Female   Lifestyle    Physical activity:     Days per week: Not on file     Minutes per session: Not on file    Stress: Not on file   Relationships    Social connections:     Talks on phone: Not on file     Gets together: Not on file     Attends Taoism service: Not on file     Active member of club or organization: Not on file     Attends meetings of clubs or organizations: Not on file     Relationship status: Not on file   Other Topics Concern    Not on file   Social History Narrative    Son and 3 stepchildren lives close by       FAMILY HISTORY:  Family History   Problem Relation Age of Onset    Diabetes Mother     No Known Problems Father      Diabetes Sister     Heart attack Sister     Heart disease Paternal Grandfather        ALLERGIES AND MEDICATIONS: updated and reviewed.  Review of patient's allergies indicates:   Allergen Reactions    Codeine      Childhood allergy, patient can't remember what happened.    Pcn [penicillins] Nausea And Vomiting    Zyrtec [cetirizine]      disorientated      Iodine and iodide containing products Rash     IV dye contrast     Current Outpatient Medications   Medication Sig Dispense Refill    ACCU-CHEK JASON PLUS METER [facility] USE AS INSTRUCTED 1 each 0    albuterol 90 mcg/actuation inhaler Inhale 2 puffs into the lungs every 6 (six) hours as needed for Wheezing or Shortness of Breath. Rescue 18 g 3    aspirin (ECOTRIN) 81 MG EC tablet Take 81 mg by mouth after dinner. .      azelastine (ASTELIN) 137 mcg (0.1 %) nasal spray 1 spray (137 mcg total) by Nasal route 2 (two) times daily. 30 mL 0    bisoprolol-hydrochlorothiazide 2.5-6.25 mg (ZIAC) 2.5-6.25 mg Tab Take 1 tablet by mouth every morning. 90 tablet 1    blood sugar diagnostic Strp 100 strips by Misc.(Non-Drug; Combo Route) route once daily. 100 strip 3    citalopram (CELEXA) 40 MG tablet TAKE 1 TABLET EVERY MORNING 90 tablet 1    gabapentin (NEURONTIN) 300 MG capsule Take 1 capsule (300 mg total) by mouth 3 (three) times daily. 270 capsule 1    lancets (ACCU-CHEK SOFTCLIX LANCETS) Misc 1 Box by Misc.(Non-Drug; Combo Route) route once daily. 100 each 3    lisinopril (PRINIVIL,ZESTRIL) 2.5 MG tablet Take 1 tablet (2.5 mg total) by mouth once daily. 90 tablet 1    meclizine (ANTIVERT) 25 mg tablet Take 1 tablet (25 mg total) by mouth 3 (three) times daily as needed. 30 tablet 0    metFORMIN (GLUCOPHAGE-XR) 500 MG 24 hr tablet TAKE ONE TABLET BY MOUTH DAILY WITH FOOD 90 tablet 1    SYMBICORT 160-4.5 mcg/actuation HFAA        No current facility-administered medications for this visit.        ROS  Review of Systems   Constitutional: Positive for  "fatigue. Negative for chills and fever.   HENT: Negative for hearing loss and sore throat.    Eyes: Negative for visual disturbance.   Respiratory: Positive for wheezing. Negative for cough and shortness of breath.    Cardiovascular: Negative for chest pain, palpitations and leg swelling.   Gastrointestinal: Negative for abdominal pain, constipation, diarrhea, nausea and vomiting.   Genitourinary: Negative for dysuria, frequency and urgency.   Musculoskeletal: Positive for arthralgias (R knee and B/L foot pain). Negative for joint swelling and myalgias.   Skin: Negative for rash and wound.   Neurological: Negative for headaches.   Psychiatric/Behavioral: Negative for agitation and confusion. The patient is not nervous/anxious.          OBJECTIVE     Physical Exam  Vitals:    04/22/19 1024   BP: 122/80   Pulse: (!) 58   Resp: 18   Temp: 97.7 °F (36.5 °C)    Body mass index is 34.58 kg/m².  Weight: 110.9 kg (244 lb 7.8 oz)   Height: 5' 10.5" (179.1 cm)     Physical Exam   Constitutional: He is oriented to person, place, and time. He appears well-developed and well-nourished. No distress.   HENT:   Head: Normocephalic and atraumatic.   Right Ear: Hearing, tympanic membrane and external ear normal.   Left Ear: Hearing, tympanic membrane and external ear normal.   Nose: Nose normal. No rhinorrhea.   Mouth/Throat: Oropharynx is clear and moist. No uvula swelling. No posterior oropharyngeal edema or posterior oropharyngeal erythema.   Eyes: Conjunctivae and EOM are normal. Right eye exhibits no discharge. Left eye exhibits no discharge. No scleral icterus.   Neck: Normal range of motion. Neck supple. No JVD present. No tracheal deviation present.   Cardiovascular: Normal rate, regular rhythm, normal heart sounds and intact distal pulses. Exam reveals no gallop and no friction rub.   No murmur heard.  Pulmonary/Chest: Effort normal and breath sounds normal. No respiratory distress. He has no wheezes.   Abdominal: Soft. " Bowel sounds are normal. He exhibits no distension and no mass. There is no tenderness. There is no rebound and no guarding.   Musculoskeletal: Normal range of motion. He exhibits no edema, tenderness or deformity.   Neurological: He is alert and oriented to person, place, and time. He exhibits normal muscle tone. Coordination normal.   Skin: Skin is warm and dry. No rash noted. No erythema.   Psychiatric: He has a normal mood and affect. His behavior is normal. Judgment and thought content normal.         Health Maintenance       Date Due Completion Date    TETANUS VACCINE 04/01/1970 ---    High Dose Statin 04/01/1973 ---    Zoster Vaccine 04/01/2012 ---    Eye Exam 08/04/2018 8/4/2017    Hemoglobin A1c 02/07/2019 8/7/2018    Lipid Panel 04/09/2019 4/9/2018    Fecal Occult Blood Test (FOBT)/[employer] 04/26/2019 4/26/2018    Foot Exam 08/07/2019 8/7/2018    Override on 8/7/2018: Done    Override on 5/3/2017: Done (See progress note for further details)    Aspirin/Antiplatelet Therapy 12/07/2019 12/7/2018            ASSESSMENT     69 y.o. male with     1. Post-traumatic osteoarthritis of right knee    2. Type 2 diabetes mellitus with diabetic neuropathy, without long-term current use of insulin    3. Mood disorder    4. Essential hypertension    5. Hyperlipidemia associated with type 2 diabetes mellitus    6. Fatigue, unspecified type    7. Nutritional anemia     8. Body mass index (BMI) of 34.0-34.9 in adult         PLAN:     1. Post-traumatic osteoarthritis of right knee  - Pt with worsening pain; will refer back to Ortho  - Ambulatory consult to Orthopedics    2. Type 2 diabetes mellitus with diabetic neuropathy, without long-term current use of insulin  - Stable; no acute issues  - Increase Gabapentin from 100 TID to 300 TID  - metFORMIN (GLUCOPHAGE-XR) 500 MG 24 hr tablet; TAKE ONE TABLET BY MOUTH DAILY WITH FOOD  Dispense: 90 tablet; Refill: 1  - Comprehensive metabolic panel; Future  - CBC auto differential;  Future  - Lipid panel; Future  - TSH; Future  - Hemoglobin A1c; Future  - Microalbumin/creatinine urine ratio  - lisinopril (PRINIVIL,ZESTRIL) 2.5 MG tablet; Take 1 tablet (2.5 mg total) by mouth once daily.  Dispense: 90 tablet; Refill: 1  - gabapentin (NEURONTIN) 300 MG capsule; Take 1 capsule (300 mg total) by mouth 3 (three) times daily.  Dispense: 270 capsule; Refill: 1    3. Mood disorder  - Stable; no acute issues  - The current medical regimen is effective;  continue present plan and medications.  - citalopram (CELEXA) 40 MG tablet; TAKE 1 TABLET EVERY MORNING  Dispense: 90 tablet; Refill: 1    4. Essential hypertension  - BP well controlled; at goal of <140/90  - The current medical regimen is effective;  continue present plan and medications.  - lisinopril (PRINIVIL,ZESTRIL) 2.5 MG tablet; Take 1 tablet (2.5 mg total) by mouth once daily.  Dispense: 90 tablet; Refill: 1  - bisoprolol-hydrochlorothiazide 2.5-6.25 mg (ZIAC) 2.5-6.25 mg Tab; Take 1 tablet by mouth every morning.  Dispense: 90 tablet; Refill: 1    5. Hyperlipidemia associated with type 2 diabetes mellitus  - Stable; no acute issues  - The current medical regimen is effective;  continue present plan and medications.    6. Fatigue, unspecified type  - Vitamin B12; Future  - Folate; Future  - Vitamin D; Future    7. Nutritional anemia   - Vitamin B12; Future  - Folate; Future    8. Body mass index (BMI) of 34.0-34.9 in adult   - Vitamin D; Future        RTC in 6 months     Justine Win MD  04/22/2019 10:49 AM        No follow-ups on file.

## 2019-04-23 PROBLEM — E78.2 MIXED HYPERLIPIDEMIA: Status: ACTIVE | Noted: 2019-01-01

## 2019-08-13 NOTE — PROGRESS NOTES
SUBJECTIVE     Chief Complaint   Patient presents with    Heartburn       HPI  Bonifacio Peoples Jr. is a 69 y.o. male with multiple medical diagnoses as listed in the medical history and problem list that presents for evaluation of GERD x 1 week. Pt reports a sensation of something getting stuck in the distal esophagus and then then the stomach gets tight. It is associated with nausea, but denies any vomiting, diarrhea, or constipation. +chills, but denies any fever or night sweats. Pt had been taking 3-4 Tums with some relief, but now takes OTC Zantac with resolution of symptoms ~30 minutes later. He does report a decreased appetite because he is afraid to eat. Denies any symptoms with grits or mashed potatoes.     PAST MEDICAL HISTORY:  Past Medical History:   Diagnosis Date    Anticoagulant long-term use     Arthritis     Rt and Lt wrist    Asthma, mild intermittent, well-controlled     Cataract     Depression     Diabetes mellitus     High cholesterol     Hypertension     Respiratory distress     Had to give a breathing treatment during last cysto procedure  2/10/12    Stroke     TIA  x3    TIA (transient ischemic attack)     Type 2 diabetes mellitus        PAST SURGICAL HISTORY:  Past Surgical History:   Procedure Laterality Date    CARDIAC SURGERY  2010, 2008    repair openings to heart    CYSTOSCOPY W/ LASER LITHOTRIPSY      2/2012 unsuccessful with stone removal. Ureteral stent placed    CYSTOSCOPY, WITH RETROGRADE PYELOGRAM AND URETERAL STENT INSERTION Bilateral 2/28/2013    [facility]    CYSTOSCOPY, WITH RETROGRADE PYELOGRAM AND URETERAL STENT INSERTION Right 2/14/2013    [facility]    DILATION, URETER, USING BALLOON Right 2/14/2013    Performed by [facility]    FLUOROSCOPY N/A 2/14/2013    [facility]    INSERTION, CATHETER  2/14/2013    Performed by  [facility] at [facility] OR    KNEE ARTHROSCOPY W/ LASER Right     x 2    LITHOTRIPSY, ESWL Right 1/14/2013    Performed by [facility] at [facility]    LITHOTRIPSY, USING LASER Right 2/14/2013    Performed by [facility] at [facility]    MANIPULATION, CALCULUS Right 2/28/2013    Per[facility] by [facility] at [facility] OR    MANIPULATION, CALCULUS Right 2/14/2013    Per[facility]    PYELOGRAM, RETROGRADE Bilateral 2/14/2013    [facility] OR    TONSILLECTOMY      as a child       SOCIAL HISTORY:  Social History     Socioeconomic History    Marital status:      Spouse name: Not on file    Number of children: Not on file    Years of education: Not on file    Highest education level: Not on file   Occupational History    Not on file   Social Needs    Financial resource strain: Not on file    Food insecurity:     Worry: Not on file     Inability: Not on file    Transportation needs:     Medical: Not on file     Non-medical: Not on file   Tobacco Use    Smoking status: Never Smoker    Smokeless tobacco: Never Used   Substance and Sexual Activity    Alcohol use: Yes     Comment: occassionally every 2-3 months     Drug use: No    Sexual activity: Yes     Partners: Female   Lifestyle    Physical activity:     Days per week: Not on file     Minutes per session: Not on file    Stress: Not on file   Relationships    Social connections:     Talks on phone: Not on file     Gets together: Not on file     Attends [employer] service: Not on file     Active member of club or organization: Not on file     Attends meetings of clubs or organizations: Not on file     Relationship status: Not on file   Other Topics Concern    Not on file   Social History Narrative    Son and 3 stepchildren lives close by       FAMILY HISTORY:  Family History   Problem Relation Age of Onset    Diabetes Mother     No Known  Problems Father     Diabetes Sister     Heart attack Sister     Heart disease Paternal Grandfather        ALLERGIES AND MEDICATIONS: updated and reviewed.  Review of patient's allergies indicates:   Allergen Reactions    Codeine      Childhood allergy, patient can't remember what happened.    Pcn [penicillins] Nausea And Vomiting    Zyrtec [cetirizine]      disorientated      Iodine and iodide containing products Rash     IV dye contrast     Current Outpatient Medications   Medication Sig Dispense Refill    ACCU-CHEK JASON PLUS METER [facility] USE AS INSTRUCTED 1 each 0    albuterol 90 mcg/actuation inhaler Inhale 2 puffs into the lungs every 6 (six) hours as needed for Wheezing or Shortness of Breath. Rescue 18 g 3    aspirin (ECOTRIN) 81 MG EC tablet Take 81 mg by mouth after dinner. .      atorvastatin (LIPITOR) 40 MG tablet Take 1 tablet (40 mg total) by mouth once daily. 90 tablet 3    bisoprolol-hydrochlorothiazide 2.5-6.25 mg (ZIAC) 2.5-6.25 mg Tab Take 1 tablet by mouth every morning. 90 tablet 1    blood sugar diagnostic Strp 100 strips by Misc.(Non-Drug; Combo Route) route once daily. 100 strip 3    citalopram (CELEXA) 40 MG tablet TAKE 1 TABLET EVERY MORNING 90 tablet 1    gabapentin (NEURONTIN) 300 MG capsule Take 1 capsule (300 mg total) by mouth 3 (three) times daily. 270 capsule 1    lancets (ACCU-CHEK SOFTCLIX LANCETS) Misc 1 Box by Misc.(Non-Drug; Combo Route) route once daily. 100 each 3    lisinopril (PRINIVIL,ZESTRIL) 2.5 MG tablet Take 1 tablet (2.5 mg total) by mouth once daily. 90 tablet 1    metFORMIN (GLUCOPHAGE-XR) 500 MG 24 hr tablet TAKE ONE TABLET BY MOUTH TWICE DAILY WITH FOOD 180 tablet 1    SYMBICORT 160-4.5 mcg/actuation HFAA       azelastine (ASTELIN) 137 mcg (0.1 %) nasal spray 1 spray (137 mcg total) by Nasal route 2 (two) times daily. 30 mL 0    meclizine (ANTIVERT) 25 mg tablet Take 1 tablet (25 mg total) by mouth 3 (three) times daily as needed. 30 tablet 0     "ranitidine (ZANTAC) 300 MG tablet Take 1 tablet (300 mg total) by mouth every evening. 30 tablet 0     No current facility-administered medications for this visit.        ROS  Review of Systems   Constitutional: Positive for chills. Negative for fever.   HENT: Negative for hearing loss and sore throat.    Eyes: Negative for visual disturbance.   Respiratory: Negative for cough and shortness of breath.    Cardiovascular: Positive for chest pain. Negative for palpitations and leg swelling.   Gastrointestinal: Positive for nausea. Negative for abdominal pain, constipation, diarrhea and vomiting.   Genitourinary: Negative for dysuria, frequency and urgency.   Musculoskeletal: Negative for arthralgias, joint swelling and myalgias.   Skin: Negative for rash and wound.   Neurological: Negative for headaches.   Psychiatric/Behavioral: Negative for agitation and confusion. The patient is not nervous/anxious.          OBJECTIVE     Physical Exam  Vitals:    08/13/19 1005   BP: 99/70   Pulse: (!) 57   Resp: 17   Temp: 98 °F (36.7 °C)    Body mass index is 33.33 kg/m².  Weight: 106.9 kg (235 lb 10.8 oz)   Height: 5' 10.51" (179.1 cm)     Physical Exam   Constitutional: He is oriented to person, place, and time. He appears well-developed and well-nourished. No distress.   HENT:   Head: Normocephalic and atraumatic.   Right Ear: External ear normal.   Left Ear: External ear normal.   Nose: Nose normal.   Mouth/Throat: Oropharynx is clear and moist.   Eyes: Conjunctivae and EOM are normal. Right eye exhibits no discharge. Left eye exhibits no discharge. No scleral icterus.   Neck: Normal range of motion. Neck supple. No JVD present. No tracheal deviation present.   Cardiovascular: Normal rate, regular rhythm, normal heart sounds and intact distal pulses. Exam reveals no gallop and no friction rub.   No murmur heard.  Pulmonary/Chest: Effort normal and breath sounds normal. No respiratory distress. He has no wheezes.   Abdominal: " Soft. Bowel sounds are normal. He exhibits no distension and no mass. There is no tenderness. There is no rebound and no guarding.   Musculoskeletal: Normal range of motion. He exhibits no edema, tenderness or deformity.        Right foot: There is normal range of motion and no deformity.        Left foot: There is normal range of motion and no deformity.   Feet:   Right Foot:   Protective Sensation: 10 sites tested. 9 sites sensed.   Skin Integrity: Positive for dry skin. Negative for ulcer, blister or skin breakdown.   Left Foot:   Protective Sensation: 10 sites tested. 8 sites sensed.   Skin Integrity: Positive for dry skin. Negative for ulcer, blister or skin breakdown.   Neurological: He is alert and oriented to person, place, and time. He exhibits normal muscle tone. Coordination normal.   Skin: Skin is warm and dry. No rash noted. No erythema.   Psychiatric: He has a normal mood and affect. His behavior is normal. Judgment and thought content normal.         Health Maintenance       Date Due Completion Date    TETANUS VACCINE 04/01/1970 ---    Shingles Vaccine (1 of 2) 04/01/2002 ---    Eye Exam 08/04/2018 8/4/2017    Fecal Occult Blood Test (FOBT)/[employer] 04/26/2019 4/26/2018    Influenza Vaccine (1) 08/01/2019 11/28/2018    Override on 4/21/2017: Declined    Foot Exam 08/07/2019 8/7/2018    Hemoglobin A1c 10/22/2019 4/22/2019    Aspirin/Antiplatelet Therapy 12/07/2019 12/7/2018    Lipid Panel 04/22/2020 4/22/2019    High Dose Statin 04/23/2020 4/23/2019            ASSESSMENT     69 y.o. male with     1. Gastroesophageal reflux disease, esophagitis presence not specified    2. Chest pain of uncertain etiology        PLAN:     1. Gastroesophageal reflux disease, esophagitis presence not specified  - Discuss diet changes and pt to elevate head of bed  - Start Zantac  - ranitidine (ZANTAC) 300 MG tablet; Take 1 tablet (300 mg total) by mouth every evening.  Dispense: 30 tablet; Refill: 0    2. Chest pain of  uncertain etiology  - EKG 12-lead; sinus bradycardia with HR at ~50s, axis ~60, 1st degree AV block, but otherwise normal intervals, no ST elevation/depression  - Cards eval for potential stress test  - Ambulatory referral to Cardiology        RTC in 4 weeks for repeat assessment of current treatment plan       Justine Win MD  08/13/2019 10:12 AM        No follow-ups on file.

## 2019-08-13 NOTE — LETTER
AUTHORIZATION FOR RELEASE OF   CONFIDENTIAL INFORMATION                               MRN [id number]    Dear Dr Orta,    We are seeing Bonifacio Peoples Jr., date of birth 1950, in the clinic at [facility]. Justine Win MD is the patient's PCP. Bonifacio Peoples Jr. has an outstanding lab/procedure at the time we reviewed his chart. In order to help keep his health information updated, he has authorized us to request the following medical record(s):        (  )  MAMMOGRAM                                      (  )  COLONOSCOPY      (  )  PAP SMEAR                                          (  )  OUTSIDE LAB RESULTS     (  )  DEXA SCAN                                          (XX)  DIABETIC EYE EXAM            (  )  FOOT EXAM                                          (  )  ENTIRE RECORD     (  )  OUTSIDE IMMUNIZATIONS                 (  )  _______________         Please fax records to [facility], Nadja N Jones, MD, [phone].     If you have any questions, please contact Kirti Dawson at [phone].                   Patient Name: Bonifacio Peoples Jr.  : 1950  Patient Phone #: [phone]

## 2019-08-13 NOTE — PATIENT INSTRUCTIONS
Tips to Control Acid Reflux    To control acid reflux, youll need to make some basic diet and lifestyle changes. The simple steps outlined below may be all youll need to ease discomfort.  Watch what you eat  · Avoid fatty foods and spicy foods.  · Eat fewer acidic foods, such as citrus and tomato-based foods. These can increase symptoms.  · Limit drinking alcohol, caffeine, and fizzy beverages. All increase acid reflux.  · Try limiting chocolate, peppermint, and spearmint. These can worsen acid reflux in some people.  Watch when you eat  · Avoid lying down for 3 hours after eating.  · Do not snack before going to bed.  Raise your head  Raising your head and upper body by 4 to 6 inches helps limit reflux when youre lying down. Put blocks under the head of your bed frame to raise it.  Other changes  · Lose weight, if you need to  · Dont exercise near bedtime  · Avoid tight-fitting clothes  · Limit aspirin and ibuprofen  · Stop smoking   Date Last Reviewed: 7/1/2016  © 3788-5699 [employer]. [address] [place] [address], [place] [address]. All rights reserved. This information is not intended as a substitute for professional medical care. Always follow your healthcare professional's instructions.

## 2019-11-01 PROBLEM — E11.40 TYPE 2 DIABETES MELLITUS WITH DIABETIC NEUROPATHY, WITHOUT LONG-TERM CURRENT USE OF INSULIN: Status: ACTIVE | Noted: 2017-05-03

## 2019-11-01 NOTE — TELEPHONE ENCOUNTER
----- Message from Justine Win MD sent at 11/1/2019 12:22 PM CDT -----  Please schedule pt for f/u in 6 months

## 2019-11-01 NOTE — PROGRESS NOTES
SUBJECTIVE     Chief Complaint   Patient presents with    Annual Exam       HPI  Bonifacio Peoples Jr. is a 69 y.o. male with multiple medical diagnoses as listed in the medical history and problem list that presents for annual exam. Pt has been doing well since his last visit. He has a good appetite and eats well. He does not exercise. He sleeps for ~12 hours nightly. Pt does take OTC supplements, Vit C, Vit D, and MVI. He does not have any current stressors. Pt is UTD on age appropriate CA screening.    PAST MEDICAL HISTORY:  Past Medical History:   Diagnosis Date    Anticoagulant long-term use     Arthritis     Rt and Lt wrist    Asthma, mild intermittent, well-controlled     Cataract     Depression     Diabetes mellitus     High cholesterol     Hypertension     Respiratory distress     Had to give a breathing treatment during last cysto procedure  2/10/12    Stroke     TIA  x3    TIA (transient ischemic attack)     Type 2 diabetes mellitus        PAST SURGICAL HISTORY:  Past Surgical History:   Procedure Laterality Date    CARDIAC SURGERY  2010, 2008    repair openings to heart    CYSTOSCOPY W/ LASER LITHOTRIPSY      2/2012 unsuccessful with stone removal. Ureteral stent placed    KNEE ARTHROSCOPY W/ LASER Right     x 2    TONSILLECTOMY      as a child       SOCIAL HISTORY:  Social History     Socioeconomic History    Marital status:      Spouse name: Not on file    Number of children: Not on file    Years of education: Not on file    Highest education level: Not on file   Occupational History    Not on file   Social Needs    Financial resource strain: Not on file    Food insecurity:     Worry: Not on file     Inability: Not on file    Transportation needs:     Medical: Not on file     Non-medical: Not on file   Tobacco Use    Smoking status: Never Smoker    Smokeless tobacco: Never Used   Substance and Sexual Activity    Alcohol use: Yes     Comment: occassionally every 2-3  months     Drug use: No    Sexual activity: Yes     Partners: Female   Lifestyle    Physical activity:     Days per week: Not on file     Minutes per session: Not on file    Stress: Not on file   Relationships    Social connections:     Talks on phone: Not on file     Gets together: Not on file     Attends [employer] service: Not on file     Active member of club or organization: Not on file     Attends meetings of clubs or organizations: Not on file     Relationship status: Not on file   Other Topics Concern    Not on file   Social History Narrative    Son and 3 stepchildren lives close by       FAMILY HISTORY:  Family History   Problem Relation Age of Onset    Diabetes Mother     No Known Problems Father     Diabetes Sister     Heart attack Sister     Heart disease Paternal Grandfather        ALLERGIES AND MEDICATIONS: updated and reviewed.  Review of patient's allergies indicates:   Allergen Reactions    Codeine      Childhood allergy, patient can't remember what happened.    Pcn [penicillins] Nausea And Vomiting    Zyrtec [cetirizine]      disorientated      Iodine and iodide containing products Rash     IV dye contrast     Current Outpatient Medications   Medication Sig Dispense Refill    ACCU-CHEK [place] PLUS METER [facility] [place]STRUCTED 1 each 0    albuterol (PROVENTIL/VENTOLIN HFA) 90 mcg/actuation inhaler Inhale 2 puffs into the lungs every 6 (six) hours as needed for Wheezing or Shortness of Breath. Rescue 18 g 3    aspirin (ECOTRIN) 81 MG EC tablet Take 81 mg by mouth after dinner. .      atorvastatin (LIPITOR) 40 MG tablet Take 1 tablet (40 mg total) by mouth once daily. 90 tablet 3    azelastine (ASTELIN) 137 mcg (0.1 %) nasal spray 1 spray (137 mcg total) by Nasal route 2 (two) times daily. 30 mL 0    bisoprolol-hydrochlorothiazide 2.5-6.25 mg (ZIAC) 2.5-6.25 mg Tab Take 1 tablet by mouth every morning. 90 tablet 1    blood sugar diagnostic Strp 100 strips by Misc.(Non-Drug; Combo  Route) route once daily. 100 strip 3    citalopram (CELEXA) 40 MG tablet TAKE 1 TABLET EVERY MORNING 90 tablet 1    gabapentin (NEURONTIN) 300 MG capsule Take 1 capsule (300 mg total) by mouth 3 (three) times daily. 270 capsule 1    lancets (ACCU-CHEK SOFTCLIX LANCETS) Misc 1 Box by Misc.(Non-Drug; Combo Route) route once daily. 100 each 3    lisinopril (PRINIVIL,ZESTRIL) 2.5 MG tablet Take 1 tablet (2.5 mg total) by mouth once daily. 90 tablet 1    meclizine (ANTIVERT) 25 mg tablet Take 1 tablet (25 mg total) by mouth 3 (three) times daily as needed. 30 tablet 0    metFORMIN (GLUCOPHAGE-XR) 500 MG 24 hr tablet Take 1 tablet (500 mg total) by mouth 2 (two) times daily with meals. 180 tablet 1    ranitidine (ZANTAC) 300 MG tablet TAKE ONE TABLET BY MOUTH EVERY EVENING 90 tablet 3    SYMBICORT 160-4.5 mcg/actuation HFAA Inhale 2 puffs into the lungs every 12 (twelve) hours. 10.2 g 5     No current facility-administered medications for this visit.        ROS  Review of Systems   Constitutional: Negative for chills and fever.   HENT: Negative for hearing loss and sore throat.    Eyes: Negative for visual disturbance.   Respiratory: Negative for cough and shortness of breath.    Cardiovascular: Negative for chest pain, palpitations and leg swelling.   Gastrointestinal: Negative for abdominal pain, constipation, diarrhea, nausea and vomiting.   Genitourinary: Negative for dysuria, frequency and urgency.   Musculoskeletal: Negative for arthralgias, joint swelling and myalgias.   Skin: Negative for rash and wound.   Neurological: Negative for headaches.   Psychiatric/Behavioral: Negative for agitation and confusion. The patient is not nervous/anxious.          OBJECTIVE     Physical Exam  Vitals:    11/01/19 1047   BP: 116/66   Pulse: (!) 56   Temp: 98.2 °F (36.8 °C)    Body mass index is 33.79 kg/m².  Weight: 108.4 kg (238 lb 15.7 oz)         Physical Exam   Constitutional: He is oriented to person, place, and  time. He appears well-developed and well-nourished. No distress.   HENT:   Head: Normocephalic and atraumatic.   Right Ear: Hearing, tympanic membrane and external ear normal.   Left Ear: Hearing, tympanic membrane and external ear normal.   Nose: Nose normal. No rhinorrhea.   Mouth/Throat: Oropharynx is clear and moist. No uvula swelling. No posterior oropharyngeal edema or posterior oropharyngeal erythema.   Eyes: Conjunctivae and EOM are normal. Right eye exhibits no discharge. Left eye exhibits no discharge. No scleral icterus.   Neck: Normal range of motion. Neck supple. No JVD present. No tracheal deviation present.   Cardiovascular: Normal rate, regular rhythm, normal heart sounds and intact distal pulses. Exam reveals no gallop and no friction rub.   No murmur heard.  Pulmonary/Chest: Effort normal and breath sounds normal. No respiratory distress. He has no wheezes.   Abdominal: Soft. Bowel sounds are normal. He exhibits no distension and no mass. There is no tenderness. There is no rebound and no guarding.   Musculoskeletal: Normal range of motion. He exhibits no edema, tenderness or deformity.   Neurological: He is alert and oriented to person, place, and time. He exhibits normal muscle tone. Coordination normal.   Skin: Skin is warm and dry. No rash noted. No erythema.   Psychiatric: He has a normal mood and affect. His behavior is normal. Judgment and thought content normal.         Health Maintenance       Date Due Completion Date    TETANUS VACCINE 04/01/1970 ---    Shingles Vaccine (1 of 2) 04/01/2002 ---    Influenza Vaccine (1) 09/01/2019 11/28/2018    Override on 4/21/2017: Declined    Hemoglobin A1c 10/22/2019 4/22/2019    Eye Exam 03/15/2020 3/15/2019    Lipid Panel 04/22/2020 4/22/2019    High Dose Statin 08/13/2020 8/13/2019    Aspirin/Antiplatelet Therapy 08/13/2020 8/13/2019    Foot Exam 08/13/2020 8/13/2019    Override on 8/13/2019: Done    Fecal Occult Blood Test (FOBT)/[facility] 08/14/2020  8/14/2019            ASSESSMENT     69 y.o. male with     1. Annual physical exam    2. Type 2 diabetes mellitus with diabetic neuropathy, without long-term current use of insulin    3. Essential hypertension    4. Mood disorder    5. Mixed hyperlipidemia    6. Mild intermittent reactive airway disease with wheezing with acute exacerbation    7. Needs flu shot        PLAN:     1. Annual physical exam  - Counseled on age appropriate medical preventative services, including age appropriate cancer screenings, over all nutritional health, need for a consistent exercise regimen and an over all push towards maintaining a vigorous and active lifestyle.  Counseled on age appropriate vaccines and discussed upcoming health care needs based on age/gender.  Spent time with patient counseling on need for a good patient/doctor relationship moving forward.  Discussed use of common OTC medications and supplements.  Discussed common dietary aids and use of caffeine and the need for good sleep hygiene and stress management.  - Check labs today    2. Type 2 diabetes mellitus with diabetic neuropathy, without long-term current use of insulin  - Stable; no acute issues  - Check labs today  - metFORMIN (GLUCOPHAGE-XR) 500 MG 24 hr tablet; Take 1 tablet (500 mg total) by mouth 2 (two) times daily with meals.  Dispense: 180 tablet; Refill: 1  - lisinopril (PRINIVIL,ZESTRIL) 2.5 MG tablet; Take 1 tablet (2.5 mg total) by mouth once daily.  Dispense: 90 tablet; Refill: 1  - blood sugar diagnostic Strp; 100 strips by Misc.(Non-Drug; Combo Route) route once daily.  Dispense: 100 strip; Refill: 3  - lancets (ACCU-CHEK SOFTCLIX LANCETS) Misc; 1 Box by Misc.(Non-Drug; Combo Route) route once daily.  Dispense: 100 each; Refill: 3  - Comprehensive metabolic panel; Future  - CBC auto differential; Future    3. Essential hypertension  - BP well controlled; at goal of <140/90  - The current medical regimen is effective;  continue present plan and  medications.  - lisinopril (PRINIVIL,ZESTRIL) 2.5 MG tablet; Take 1 tablet (2.5 mg total) by mouth once daily.  Dispense: 90 tablet; Refill: 1  - bisoprolol-hydrochlorothiazide 2.5-6.25 mg (ZIAC) 2.5-6.25 mg Tab; Take 1 tablet by mouth every morning.  Dispense: 90 tablet; Refill: 1    4. Mood disorder  - Stable; no acute issues  - The current medical regimen is effective;  continue present plan and medications.  - citalopram (CELEXA) 40 MG tablet; TAKE 1 TABLET EVERY MORNING  Dispense: 90 tablet; Refill: 1    5. Mixed hyperlipidemia  - Stable; no acute issues  - The current medical regimen is effective;  continue present plan and medications.  - atorvastatin (LIPITOR) 40 MG tablet; Take 1 tablet (40 mg total) by mouth once daily.  Dispense: 90 tablet; Refill: 3    6. Mild intermittent reactive airway disease with wheezing with acute exacerbation  - Stable; no acute issues  - The current medical regimen is effective;  continue present plan and medications.  - SYMBICORT 160-4.5 mcg/actuation HFAA; Inhale 2 puffs into the lungs every 12 (twelve) hours.  Dispense: 10.2 g; Refill: 5  - albuterol (PROVENTIL/VENTOLIN HFA) 90 mcg/actuation inhaler; Inhale 2 puffs into the lungs every 6 (six) hours as needed for Wheezing or Shortness of Breath. Rescue  Dispense: 18 g; Refill: 3    7. Needs flu shot  - Influenza - High Dose (65+) (PF) (IM)        RTC in 6 months     Justine Win MD  11/01/2019 11:09 AM        No follow-ups on file.

## 2019-11-05 NOTE — TELEPHONE ENCOUNTER
Call placed to Pt, and spoke with his wife and informed of Provider response. She states that he wants to reschedule his appointment. They want to come on the same date. She will call back with date that they would like to come.

## 2020-01-01 ENCOUNTER — HOSPITAL ENCOUNTER (INPATIENT)
Facility: HOSPITAL | Age: 70
LOS: 2 days | DRG: 871 | End: 2020-03-16
Attending: EMERGENCY MEDICINE | Admitting: HOSPITALIST
Payer: MEDICARE

## 2020-01-01 ENCOUNTER — ANESTHESIA EVENT (OUTPATIENT)
Dept: ENDOSCOPY | Facility: HOSPITAL | Age: 70
DRG: 871 | End: 2020-01-01
Payer: MEDICARE

## 2020-01-01 ENCOUNTER — ANESTHESIA EVENT (OUTPATIENT)
Dept: INTENSIVE CARE | Facility: HOSPITAL | Age: 70
DRG: 871 | End: 2020-01-01
Payer: MEDICARE

## 2020-01-01 ENCOUNTER — OFFICE VISIT (OUTPATIENT)
Dept: FAMILY MEDICINE | Facility: CLINIC | Age: 70
End: 2020-01-01
Payer: MEDICARE

## 2020-01-01 ENCOUNTER — ANESTHESIA (OUTPATIENT)
Dept: INTENSIVE CARE | Facility: HOSPITAL | Age: 70
DRG: 871 | End: 2020-01-01
Payer: MEDICARE

## 2020-01-01 ENCOUNTER — ANESTHESIA (OUTPATIENT)
Dept: ENDOSCOPY | Facility: HOSPITAL | Age: 70
DRG: 871 | End: 2020-01-01
Payer: MEDICARE

## 2020-01-01 VITALS
BODY MASS INDEX: 41.33 KG/M2 | WEIGHT: 295.19 LBS | DIASTOLIC BLOOD PRESSURE: 39 MMHG | HEIGHT: 71 IN | RESPIRATION RATE: 22 BRPM | TEMPERATURE: 94 F | OXYGEN SATURATION: 56 % | SYSTOLIC BLOOD PRESSURE: 67 MMHG

## 2020-01-01 VITALS
RESPIRATION RATE: 16 BRPM | BODY MASS INDEX: 33.7 KG/M2 | WEIGHT: 240.75 LBS | TEMPERATURE: 98 F | SYSTOLIC BLOOD PRESSURE: 122 MMHG | HEIGHT: 71 IN | HEART RATE: 56 BPM | OXYGEN SATURATION: 97 % | DIASTOLIC BLOOD PRESSURE: 80 MMHG

## 2020-01-01 DIAGNOSIS — J30.1 NON-SEASONAL ALLERGIC RHINITIS DUE TO POLLEN: ICD-10-CM

## 2020-01-01 DIAGNOSIS — G25.81 RESTLESS LEG SYNDROME, CONTROLLED: ICD-10-CM

## 2020-01-01 DIAGNOSIS — I10 ESSENTIAL HYPERTENSION: ICD-10-CM

## 2020-01-01 DIAGNOSIS — K85.10 ACUTE BILIARY PANCREATITIS, UNSPECIFIED COMPLICATION STATUS: ICD-10-CM

## 2020-01-01 DIAGNOSIS — E11.40 TYPE 2 DIABETES MELLITUS WITH DIABETIC NEUROPATHY, WITHOUT LONG-TERM CURRENT USE OF INSULIN: ICD-10-CM

## 2020-01-01 DIAGNOSIS — K21.9 GASTROESOPHAGEAL REFLUX DISEASE, ESOPHAGITIS PRESENCE NOT SPECIFIED: ICD-10-CM

## 2020-01-01 DIAGNOSIS — Z45.2 ENCOUNTER FOR CENTRAL LINE PLACEMENT: ICD-10-CM

## 2020-01-01 DIAGNOSIS — N18.30 CKD (CHRONIC KIDNEY DISEASE), STAGE III: ICD-10-CM

## 2020-01-01 DIAGNOSIS — N18.30 ACUTE RENAL FAILURE SUPERIMPOSED ON STAGE 3 CHRONIC KIDNEY DISEASE: ICD-10-CM

## 2020-01-01 DIAGNOSIS — J45.41 MODERATE PERSISTENT ASTHMA WITH ACUTE EXACERBATION: Chronic | ICD-10-CM

## 2020-01-01 DIAGNOSIS — Z12.5 PROSTATE CANCER SCREENING: ICD-10-CM

## 2020-01-01 DIAGNOSIS — E11.40 TYPE 2 DIABETES MELLITUS WITH DIABETIC NEUROPATHY, WITHOUT LONG-TERM CURRENT USE OF INSULIN: Primary | ICD-10-CM

## 2020-01-01 DIAGNOSIS — A41.9 SEPSIS WITH ACUTE ORGAN DYSFUNCTION AND SEPTIC SHOCK: ICD-10-CM

## 2020-01-01 DIAGNOSIS — I48.91 ATRIAL FIBRILLATION WITH RAPID VENTRICULAR RESPONSE: ICD-10-CM

## 2020-01-01 DIAGNOSIS — Z00.00 ENCOUNTER FOR PREVENTIVE HEALTH EXAMINATION: Primary | ICD-10-CM

## 2020-01-01 DIAGNOSIS — Z99.11 ON MECHANICALLY ASSISTED VENTILATION: ICD-10-CM

## 2020-01-01 DIAGNOSIS — J45.21 MILD INTERMITTENT REACTIVE AIRWAY DISEASE WITH WHEEZING WITH ACUTE EXACERBATION: ICD-10-CM

## 2020-01-01 DIAGNOSIS — E66.9 OBESITY (BMI 30.0-34.9): ICD-10-CM

## 2020-01-01 DIAGNOSIS — K80.50 CHOLEDOCHOLITHIASIS: Primary | ICD-10-CM

## 2020-01-01 DIAGNOSIS — E78.2 MIXED HYPERLIPIDEMIA: ICD-10-CM

## 2020-01-01 DIAGNOSIS — I69.30 HISTORY OF CEREBROVASCULAR ACCIDENT (CVA) WITH RESIDUAL DEFICIT: ICD-10-CM

## 2020-01-01 DIAGNOSIS — E87.20 LACTIC ACIDOSIS: ICD-10-CM

## 2020-01-01 DIAGNOSIS — K83.1 CHOLESTASIS: ICD-10-CM

## 2020-01-01 DIAGNOSIS — K85.10 ACUTE BILIARY PANCREATITIS: ICD-10-CM

## 2020-01-01 DIAGNOSIS — Z87.442 HISTORY OF KIDNEY STONES: ICD-10-CM

## 2020-01-01 DIAGNOSIS — Z12.11 COLON CANCER SCREENING: ICD-10-CM

## 2020-01-01 DIAGNOSIS — N17.9 ACUTE RENAL FAILURE SUPERIMPOSED ON STAGE 3 CHRONIC KIDNEY DISEASE: ICD-10-CM

## 2020-01-01 DIAGNOSIS — I65.23 BILATERAL CAROTID ARTERY STENOSIS: ICD-10-CM

## 2020-01-01 DIAGNOSIS — R65.21 SEPSIS WITH ACUTE ORGAN DYSFUNCTION AND SEPTIC SHOCK: ICD-10-CM

## 2020-01-01 DIAGNOSIS — K83.09 ASCENDING CHOLANGITIS: ICD-10-CM

## 2020-01-01 DIAGNOSIS — F39 MOOD DISORDER: ICD-10-CM

## 2020-01-01 DIAGNOSIS — R00.0 SINUS TACHYCARDIA: ICD-10-CM

## 2020-01-01 LAB
ALBUMIN SERPL BCP-MCNC: 1.7 G/DL (ref 3.5–5.2)
ALBUMIN SERPL BCP-MCNC: 1.8 G/DL (ref 3.5–5.2)
ALBUMIN SERPL BCP-MCNC: 2.3 G/DL (ref 3.5–5.2)
ALBUMIN SERPL BCP-MCNC: 2.4 G/DL (ref 3.5–5.2)
ALBUMIN SERPL BCP-MCNC: 2.4 G/DL (ref 3.5–5.2)
ALBUMIN SERPL BCP-MCNC: 3.3 G/DL (ref 3.5–5.2)
ALLENS TEST: ABNORMAL
ALP SERPL-CCNC: 218 U/L (ref 55–135)
ALP SERPL-CCNC: 258 U/L (ref 55–135)
ALP SERPL-CCNC: 307 U/L (ref 55–135)
ALP SERPL-CCNC: 507 U/L (ref 55–135)
ALT SERPL W/O P-5'-P-CCNC: 1467 U/L (ref 10–44)
ALT SERPL W/O P-5'-P-CCNC: 431 U/L (ref 10–44)
ALT SERPL W/O P-5'-P-CCNC: 695 U/L (ref 10–44)
ALT SERPL W/O P-5'-P-CCNC: 7069 U/L (ref 10–44)
AMIKACIN PEAK SERPL-MCNC: 22.3 UG/ML (ref 10–60)
ANION GAP SERPL CALC-SCNC: 15 MMOL/L (ref 8–16)
ANION GAP SERPL CALC-SCNC: 22 MMOL/L (ref 8–16)
ANION GAP SERPL CALC-SCNC: 23 MMOL/L (ref 8–16)
ANION GAP SERPL CALC-SCNC: 26 MMOL/L (ref 8–16)
ANION GAP SERPL CALC-SCNC: 27 MMOL/L (ref 8–16)
ANION GAP SERPL CALC-SCNC: 27 MMOL/L (ref 8–16)
ANION GAP SERPL CALC-SCNC: 29 MMOL/L (ref 8–16)
ANION GAP SERPL CALC-SCNC: 29 MMOL/L (ref 8–16)
APTT BLDCRRT: 42.9 SEC (ref 21–32)
APTT BLDCRRT: 50.9 SEC (ref 21–32)
AST SERPL-CCNC: 1377 U/L (ref 10–40)
AST SERPL-CCNC: 363 U/L (ref 10–40)
AST SERPL-CCNC: 556 U/L (ref 10–40)
AST SERPL-CCNC: ABNORMAL U/L (ref 10–40)
B-OH-BUTYR BLD STRIP-SCNC: 0.3 MMOL/L (ref 0–0.5)
B-OH-BUTYR BLD STRIP-SCNC: 0.5 MMOL/L (ref 0–0.5)
BACTERIA #/AREA URNS HPF: ABNORMAL /HPF
BACTERIA BLD CULT: ABNORMAL
BASOPHILS # BLD AUTO: 0.07 K/UL (ref 0–0.2)
BASOPHILS # BLD AUTO: 0.11 K/UL (ref 0–0.2)
BASOPHILS # BLD AUTO: 0.18 K/UL (ref 0–0.2)
BASOPHILS # BLD AUTO: ABNORMAL K/UL (ref 0–0.2)
BASOPHILS NFR BLD: 0 % (ref 0–1.9)
BASOPHILS NFR BLD: 0.3 % (ref 0–1.9)
BASOPHILS NFR BLD: 0.6 % (ref 0–1.9)
BASOPHILS NFR BLD: 0.8 % (ref 0–1.9)
BILIRUB SERPL-MCNC: 2.8 MG/DL (ref 0.1–1)
BILIRUB SERPL-MCNC: 4.1 MG/DL (ref 0.1–1)
BILIRUB SERPL-MCNC: 5.5 MG/DL (ref 0.1–1)
BILIRUB SERPL-MCNC: 9.7 MG/DL (ref 0.1–1)
BILIRUB UR QL STRIP: ABNORMAL
BNP SERPL-MCNC: 108 PG/ML (ref 0–99)
BNP SERPL-MCNC: 2304 PG/ML (ref 0–99)
BUN SERPL-MCNC: 19 MG/DL (ref 8–23)
BUN SERPL-MCNC: 19 MG/DL (ref 8–23)
BUN SERPL-MCNC: 22 MG/DL (ref 8–23)
BUN SERPL-MCNC: 27 MG/DL (ref 8–23)
BUN SERPL-MCNC: 27 MG/DL (ref 8–23)
BUN SERPL-MCNC: 31 MG/DL (ref 8–23)
BUN SERPL-MCNC: 32 MG/DL (ref 8–23)
BUN SERPL-MCNC: 36 MG/DL (ref 8–23)
CA-I BLDV-SCNC: 0.75 MMOL/L (ref 1.06–1.42)
CALCIUM SERPL-MCNC: 5.6 MG/DL (ref 8.7–10.5)
CALCIUM SERPL-MCNC: 5.6 MG/DL (ref 8.7–10.5)
CALCIUM SERPL-MCNC: 5.7 MG/DL (ref 8.7–10.5)
CALCIUM SERPL-MCNC: 5.9 MG/DL (ref 8.7–10.5)
CALCIUM SERPL-MCNC: 6.5 MG/DL (ref 8.7–10.5)
CALCIUM SERPL-MCNC: 6.9 MG/DL (ref 8.7–10.5)
CHLORIDE SERPL-SCNC: 100 MMOL/L (ref 95–110)
CHLORIDE SERPL-SCNC: 100 MMOL/L (ref 95–110)
CHLORIDE SERPL-SCNC: 102 MMOL/L (ref 95–110)
CHLORIDE SERPL-SCNC: 102 MMOL/L (ref 95–110)
CHLORIDE SERPL-SCNC: 104 MMOL/L (ref 95–110)
CHLORIDE SERPL-SCNC: 96 MMOL/L (ref 95–110)
CHLORIDE SERPL-SCNC: 96 MMOL/L (ref 95–110)
CHLORIDE SERPL-SCNC: 99 MMOL/L (ref 95–110)
CLARITY UR: ABNORMAL
CO2 SERPL-SCNC: 11 MMOL/L (ref 23–29)
CO2 SERPL-SCNC: 11 MMOL/L (ref 23–29)
CO2 SERPL-SCNC: 13 MMOL/L (ref 23–29)
CO2 SERPL-SCNC: 14 MMOL/L (ref 23–29)
CO2 SERPL-SCNC: 6 MMOL/L (ref 23–29)
CO2 SERPL-SCNC: 6 MMOL/L (ref 23–29)
CO2 SERPL-SCNC: 7 MMOL/L (ref 23–29)
CO2 SERPL-SCNC: 7 MMOL/L (ref 23–29)
COLOR UR: ABNORMAL
CREAT SERPL-MCNC: 2.2 MG/DL (ref 0.5–1.4)
CREAT SERPL-MCNC: 2.7 MG/DL (ref 0.5–1.4)
CREAT SERPL-MCNC: 2.7 MG/DL (ref 0.5–1.4)
CREAT SERPL-MCNC: 3 MG/DL (ref 0.5–1.4)
CREAT SERPL-MCNC: 3.2 MG/DL (ref 0.5–1.4)
CREAT SERPL-MCNC: 3.3 MG/DL (ref 0.5–1.4)
CREAT SERPL-MCNC: 3.3 MG/DL (ref 0.5–1.4)
CREAT SERPL-MCNC: 3.4 MG/DL (ref 0.5–1.4)
DELSYS: ABNORMAL
DIFFERENTIAL METHOD: ABNORMAL
EOSINOPHIL # BLD AUTO: 0 K/UL (ref 0–0.5)
EOSINOPHIL # BLD AUTO: 0.1 K/UL (ref 0–0.5)
EOSINOPHIL # BLD AUTO: 0.6 K/UL (ref 0–0.5)
EOSINOPHIL # BLD AUTO: ABNORMAL K/UL (ref 0–0.5)
EOSINOPHIL NFR BLD: 0 % (ref 0–8)
EOSINOPHIL NFR BLD: 0.1 % (ref 0–8)
EOSINOPHIL NFR BLD: 0.5 % (ref 0–8)
EOSINOPHIL NFR BLD: 3.2 % (ref 0–8)
ERYTHROCYTE [DISTWIDTH] IN BLOOD BY AUTOMATED COUNT: 13.2 % (ref 11.5–14.5)
ERYTHROCYTE [DISTWIDTH] IN BLOOD BY AUTOMATED COUNT: 13.3 % (ref 11.5–14.5)
ERYTHROCYTE [DISTWIDTH] IN BLOOD BY AUTOMATED COUNT: 13.3 % (ref 11.5–14.5)
ERYTHROCYTE [DISTWIDTH] IN BLOOD BY AUTOMATED COUNT: 13.8 % (ref 11.5–14.5)
ERYTHROCYTE [SEDIMENTATION RATE] IN BLOOD BY WESTERGREN METHOD: 18 MM/H
ERYTHROCYTE [SEDIMENTATION RATE] IN BLOOD BY WESTERGREN METHOD: 18 MM/H
ERYTHROCYTE [SEDIMENTATION RATE] IN BLOOD BY WESTERGREN METHOD: 22 MM/H
EST. GFR  (AFRICAN AMERICAN): 20 ML/MIN/1.73 M^2
EST. GFR  (AFRICAN AMERICAN): 21 ML/MIN/1.73 M^2
EST. GFR  (AFRICAN AMERICAN): 21 ML/MIN/1.73 M^2
EST. GFR  (AFRICAN AMERICAN): 22 ML/MIN/1.73 M^2
EST. GFR  (AFRICAN AMERICAN): 24 ML/MIN/1.73 M^2
EST. GFR  (AFRICAN AMERICAN): 27 ML/MIN/1.73 M^2
EST. GFR  (AFRICAN AMERICAN): 27 ML/MIN/1.73 M^2
EST. GFR  (AFRICAN AMERICAN): 35 ML/MIN/1.73 M^2
EST. GFR  (NON AFRICAN AMERICAN): 18 ML/MIN/1.73 M^2
EST. GFR  (NON AFRICAN AMERICAN): 19 ML/MIN/1.73 M^2
EST. GFR  (NON AFRICAN AMERICAN): 21 ML/MIN/1.73 M^2
EST. GFR  (NON AFRICAN AMERICAN): 23 ML/MIN/1.73 M^2
EST. GFR  (NON AFRICAN AMERICAN): 23 ML/MIN/1.73 M^2
EST. GFR  (NON AFRICAN AMERICAN): 30 ML/MIN/1.73 M^2
ESTIMATED AVG GLUCOSE: 151 MG/DL (ref 68–131)
FIO2: 100
FIO2: 21
FIO2: 50
FLOW: 15
GIANT PLATELETS BLD QL SMEAR: PRESENT
GIANT PLATELETS BLD QL SMEAR: PRESENT
GLUCOSE SERPL-MCNC: 164 MG/DL (ref 70–110)
GLUCOSE SERPL-MCNC: 203 MG/DL (ref 70–110)
GLUCOSE SERPL-MCNC: 203 MG/DL (ref 70–110)
GLUCOSE SERPL-MCNC: 260 MG/DL (ref 70–110)
GLUCOSE SERPL-MCNC: 260 MG/DL (ref 70–110)
GLUCOSE SERPL-MCNC: 362 MG/DL (ref 70–110)
GLUCOSE SERPL-MCNC: 375 MG/DL (ref 70–110)
GLUCOSE SERPL-MCNC: 383 MG/DL (ref 70–110)
GLUCOSE SERPL-MCNC: 423 MG/DL (ref 70–110)
GLUCOSE UR QL STRIP: ABNORMAL
GRAN CASTS #/AREA URNS LPF: 6 /LPF
HBA1C MFR BLD HPLC: 6.9 % (ref 4–5.6)
HBV SURFACE AG SERPL QL IA: NEGATIVE
HCO3 UR-SCNC: 10.9 MMOL/L (ref 24–28)
HCO3 UR-SCNC: 11.4 MMOL/L (ref 24–28)
HCO3 UR-SCNC: 12.6 MMOL/L (ref 24–28)
HCO3 UR-SCNC: 14.2 MMOL/L (ref 24–28)
HCO3 UR-SCNC: 7.5 MMOL/L (ref 24–28)
HCO3 UR-SCNC: 7.6 MMOL/L (ref 24–28)
HCO3 UR-SCNC: 7.9 MMOL/L (ref 24–28)
HCT VFR BLD AUTO: 37 % (ref 40–54)
HCT VFR BLD AUTO: 47.1 % (ref 40–54)
HCT VFR BLD AUTO: 55.1 % (ref 40–54)
HCT VFR BLD AUTO: 56.6 % (ref 40–54)
HGB BLD-MCNC: 11.9 G/DL (ref 14–18)
HGB BLD-MCNC: 14.6 G/DL (ref 14–18)
HGB BLD-MCNC: 17 G/DL (ref 14–18)
HGB BLD-MCNC: 18.7 G/DL (ref 14–18)
HGB UR QL STRIP: ABNORMAL
HYALINE CASTS #/AREA URNS LPF: 0 /LPF
IMM GRANULOCYTES # BLD AUTO: 0.23 K/UL (ref 0–0.04)
IMM GRANULOCYTES # BLD AUTO: 0.24 K/UL (ref 0–0.04)
IMM GRANULOCYTES # BLD AUTO: 0.37 K/UL (ref 0–0.04)
IMM GRANULOCYTES # BLD AUTO: ABNORMAL K/UL (ref 0–0.04)
IMM GRANULOCYTES NFR BLD AUTO: 1 % (ref 0–0.5)
IMM GRANULOCYTES NFR BLD AUTO: 1.3 % (ref 0–0.5)
IMM GRANULOCYTES NFR BLD AUTO: 1.4 % (ref 0–0.5)
IMM GRANULOCYTES NFR BLD AUTO: ABNORMAL % (ref 0–0.5)
INR PPP: 1.1 (ref 0.8–1.2)
INR PPP: 1.3 (ref 0.8–1.2)
INR PPP: 2.5 (ref 0.8–1.2)
KETONES UR QL STRIP: ABNORMAL
LACTATE SERPL-SCNC: 7.1 MMOL/L (ref 0.5–2.2)
LACTATE SERPL-SCNC: 9.5 MMOL/L (ref 0.5–2.2)
LACTATE SERPL-SCNC: >12 MMOL/L (ref 0.5–2.2)
LACTATE SERPL-SCNC: >12 MMOL/L (ref 0.5–2.2)
LEUKOCYTE ESTERASE UR QL STRIP: ABNORMAL
LIPASE SERPL-CCNC: >1000 U/L (ref 4–60)
LYMPHOCYTES # BLD AUTO: 0.9 K/UL (ref 1–4.8)
LYMPHOCYTES # BLD AUTO: 1 K/UL (ref 1–4.8)
LYMPHOCYTES # BLD AUTO: 1.5 K/UL (ref 1–4.8)
LYMPHOCYTES # BLD AUTO: ABNORMAL K/UL (ref 1–4.8)
LYMPHOCYTES NFR BLD: 3.2 % (ref 18–48)
LYMPHOCYTES NFR BLD: 5.1 % (ref 18–48)
LYMPHOCYTES NFR BLD: 6.7 % (ref 18–48)
LYMPHOCYTES NFR BLD: 7 % (ref 18–48)
MAGNESIUM SERPL-MCNC: 2 MG/DL (ref 1.6–2.6)
MAGNESIUM SERPL-MCNC: 2.1 MG/DL (ref 1.6–2.6)
MAGNESIUM SERPL-MCNC: 2.1 MG/DL (ref 1.6–2.6)
MAGNESIUM SERPL-MCNC: 2.2 MG/DL (ref 1.6–2.6)
MAGNESIUM SERPL-MCNC: 2.3 MG/DL (ref 1.6–2.6)
MCH RBC QN AUTO: 30.2 PG (ref 27–31)
MCH RBC QN AUTO: 30.5 PG (ref 27–31)
MCH RBC QN AUTO: 30.5 PG (ref 27–31)
MCH RBC QN AUTO: 30.7 PG (ref 27–31)
MCHC RBC AUTO-ENTMCNC: 30.9 G/DL (ref 32–36)
MCHC RBC AUTO-ENTMCNC: 31 G/DL (ref 32–36)
MCHC RBC AUTO-ENTMCNC: 32.2 G/DL (ref 32–36)
MCHC RBC AUTO-ENTMCNC: 33 G/DL (ref 32–36)
MCV RBC AUTO: 100 FL (ref 82–98)
MCV RBC AUTO: 91 FL (ref 82–98)
MCV RBC AUTO: 95 FL (ref 82–98)
MCV RBC AUTO: 98 FL (ref 82–98)
MICROSCOPIC COMMENT: ABNORMAL
MIN VOL: 12.2
MIN VOL: 13.1
MODE: ABNORMAL
MONOCYTES # BLD AUTO: 1.1 K/UL (ref 0.3–1)
MONOCYTES # BLD AUTO: 1.4 K/UL (ref 0.3–1)
MONOCYTES # BLD AUTO: 1.9 K/UL (ref 0.3–1)
MONOCYTES # BLD AUTO: ABNORMAL K/UL (ref 0.3–1)
MONOCYTES NFR BLD: 5.9 % (ref 4–15)
MONOCYTES NFR BLD: 6.1 % (ref 4–15)
MONOCYTES NFR BLD: 7.1 % (ref 4–15)
MONOCYTES NFR BLD: 8 % (ref 4–15)
MYELOCYTES NFR BLD MANUAL: 2 %
NEUTROPHILS # BLD AUTO: 15.7 K/UL (ref 1.8–7.7)
NEUTROPHILS # BLD AUTO: 19.1 K/UL (ref 1.8–7.7)
NEUTROPHILS # BLD AUTO: 23.6 K/UL (ref 1.8–7.7)
NEUTROPHILS NFR BLD: 53 % (ref 38–73)
NEUTROPHILS NFR BLD: 83.9 % (ref 38–73)
NEUTROPHILS NFR BLD: 85.3 % (ref 38–73)
NEUTROPHILS NFR BLD: 87.5 % (ref 38–73)
NEUTS BAND NFR BLD MANUAL: 30 %
NITRITE UR QL STRIP: NEGATIVE
NRBC BLD-RTO: 0 /100 WBC
NRBC BLD-RTO: 1 /100 WBC
PCO2 BLDA: 23.9 MMHG (ref 35–45)
PCO2 BLDA: 32.1 MMHG (ref 35–45)
PCO2 BLDA: 32.1 MMHG (ref 35–45)
PCO2 BLDA: 34 MMHG (ref 35–45)
PCO2 BLDA: 35.3 MMHG (ref 35–45)
PCO2 BLDA: 37.1 MMHG (ref 35–45)
PCO2 BLDA: 40.3 MMHG (ref 35–45)
PEEP: 5
PEEP: 8
PEEP: 8
PH SMN: 6.88 [PH] (ref 7.35–7.45)
PH SMN: 6.96 [PH] (ref 7.35–7.45)
PH SMN: 7 [PH] (ref 7.35–7.45)
PH SMN: 7.12 [PH] (ref 7.35–7.45)
PH SMN: 7.14 [PH] (ref 7.35–7.45)
PH SMN: 7.14 [PH] (ref 7.35–7.45)
PH SMN: 7.38 [PH] (ref 7.35–7.45)
PH UR STRIP: 5 [PH] (ref 5–8)
PHOSPHATE SERPL-MCNC: 5.4 MG/DL (ref 2.7–4.5)
PHOSPHATE SERPL-MCNC: 5.4 MG/DL (ref 2.7–4.5)
PHOSPHATE SERPL-MCNC: 5.8 MG/DL (ref 2.7–4.5)
PHOSPHATE SERPL-MCNC: 6.3 MG/DL (ref 2.7–4.5)
PHOSPHATE SERPL-MCNC: 8.1 MG/DL (ref 2.7–4.5)
PIP: 21
PIP: 28
PLATELET # BLD AUTO: 111 K/UL (ref 150–350)
PLATELET # BLD AUTO: 171 K/UL (ref 150–350)
PLATELET # BLD AUTO: 58 K/UL (ref 150–350)
PLATELET # BLD AUTO: ABNORMAL K/UL (ref 150–350)
PLATELET BLD QL SMEAR: ABNORMAL
PLATELET BLD QL SMEAR: ABNORMAL
PMV BLD AUTO: 11.6 FL (ref 9.2–12.9)
PMV BLD AUTO: 11.7 FL (ref 9.2–12.9)
PMV BLD AUTO: 11.9 FL (ref 9.2–12.9)
PMV BLD AUTO: ABNORMAL FL (ref 9.2–12.9)
PO2 BLDA: 36 MMHG (ref 40–60)
PO2 BLDA: 56 MMHG (ref 80–100)
PO2 BLDA: 63 MMHG (ref 80–100)
PO2 BLDA: 67 MMHG (ref 80–100)
PO2 BLDA: 74 MMHG (ref 80–100)
PO2 BLDA: 84 MMHG (ref 80–100)
PO2 BLDA: 93 MMHG (ref 80–100)
POC BE: -16 MMOL/L
POC BE: -17 MMOL/L
POC BE: -17 MMOL/L
POC BE: -23 MMOL/L
POC BE: -24 MMOL/L
POC BE: -26 MMOL/L
POC BE: -8 MMOL/L
POC SATURATED O2: 69 % (ref 95–100)
POC SATURATED O2: 78 % (ref 95–100)
POC SATURATED O2: 80 % (ref 95–100)
POC SATURATED O2: 81 % (ref 95–100)
POC SATURATED O2: 84 % (ref 95–100)
POC SATURATED O2: 88 % (ref 95–100)
POC SATURATED O2: 94 % (ref 95–100)
POC TCO2: 12 MMOL/L (ref 23–27)
POC TCO2: 12 MMOL/L (ref 23–27)
POC TCO2: 14 MMOL/L (ref 23–27)
POC TCO2: 15 MMOL/L (ref 24–29)
POC TCO2: 9 MMOL/L (ref 23–27)
POCT GLUCOSE: 139 MG/DL (ref 70–110)
POCT GLUCOSE: 147 MG/DL (ref 70–110)
POCT GLUCOSE: 150 MG/DL (ref 70–110)
POCT GLUCOSE: 151 MG/DL (ref 70–110)
POCT GLUCOSE: 152 MG/DL (ref 70–110)
POCT GLUCOSE: 154 MG/DL (ref 70–110)
POCT GLUCOSE: 169 MG/DL (ref 70–110)
POCT GLUCOSE: 172 MG/DL (ref 70–110)
POCT GLUCOSE: 176 MG/DL (ref 70–110)
POCT GLUCOSE: 184 MG/DL (ref 70–110)
POCT GLUCOSE: 192 MG/DL (ref 70–110)
POCT GLUCOSE: 204 MG/DL (ref 70–110)
POCT GLUCOSE: 219 MG/DL (ref 70–110)
POCT GLUCOSE: 232 MG/DL (ref 70–110)
POCT GLUCOSE: 237 MG/DL (ref 70–110)
POCT GLUCOSE: 250 MG/DL (ref 70–110)
POCT GLUCOSE: 250 MG/DL (ref 70–110)
POCT GLUCOSE: 263 MG/DL (ref 70–110)
POCT GLUCOSE: 274 MG/DL (ref 70–110)
POCT GLUCOSE: 288 MG/DL (ref 70–110)
POCT GLUCOSE: 304 MG/DL (ref 70–110)
POCT GLUCOSE: 310 MG/DL (ref 70–110)
POCT GLUCOSE: 312 MG/DL (ref 70–110)
POCT GLUCOSE: 329 MG/DL (ref 70–110)
POCT GLUCOSE: 338 MG/DL (ref 70–110)
POCT GLUCOSE: 346 MG/DL (ref 70–110)
POCT GLUCOSE: 352 MG/DL (ref 70–110)
POCT GLUCOSE: 360 MG/DL (ref 70–110)
POCT GLUCOSE: 362 MG/DL (ref 70–110)
POCT GLUCOSE: 375 MG/DL (ref 70–110)
POCT GLUCOSE: 379 MG/DL (ref 70–110)
POCT GLUCOSE: 386 MG/DL (ref 70–110)
POCT GLUCOSE: 420 MG/DL (ref 70–110)
POCT GLUCOSE: 452 MG/DL (ref 70–110)
POTASSIUM SERPL-SCNC: 3.9 MMOL/L (ref 3.5–5.1)
POTASSIUM SERPL-SCNC: 3.9 MMOL/L (ref 3.5–5.1)
POTASSIUM SERPL-SCNC: 4.5 MMOL/L (ref 3.5–5.1)
POTASSIUM SERPL-SCNC: 4.9 MMOL/L (ref 3.5–5.1)
POTASSIUM SERPL-SCNC: 5.2 MMOL/L (ref 3.5–5.1)
POTASSIUM SERPL-SCNC: 5.4 MMOL/L (ref 3.5–5.1)
POTASSIUM SERPL-SCNC: 5.4 MMOL/L (ref 3.5–5.1)
POTASSIUM SERPL-SCNC: 5.5 MMOL/L (ref 3.5–5.1)
PROT SERPL-MCNC: 3.6 G/DL (ref 6–8.4)
PROT SERPL-MCNC: 4.7 G/DL (ref 6–8.4)
PROT SERPL-MCNC: 5.7 G/DL (ref 6–8.4)
PROT SERPL-MCNC: 6.1 G/DL (ref 6–8.4)
PROT UR QL STRIP: ABNORMAL
PROTHROMBIN TIME: 11.9 SEC (ref 9–12.5)
PROTHROMBIN TIME: 13.9 SEC (ref 9–12.5)
PROTHROMBIN TIME: 27.4 SEC (ref 9–12.5)
RBC # BLD AUTO: 3.9 M/UL (ref 4.6–6.2)
RBC # BLD AUTO: 4.79 M/UL (ref 4.6–6.2)
RBC # BLD AUTO: 5.53 M/UL (ref 4.6–6.2)
RBC # BLD AUTO: 6.2 M/UL (ref 4.6–6.2)
RBC #/AREA URNS HPF: 6 /HPF (ref 0–4)
SAMPLE: ABNORMAL
SITE: ABNORMAL
SODIUM SERPL-SCNC: 132 MMOL/L (ref 136–145)
SODIUM SERPL-SCNC: 132 MMOL/L (ref 136–145)
SODIUM SERPL-SCNC: 134 MMOL/L (ref 136–145)
SODIUM SERPL-SCNC: 134 MMOL/L (ref 136–145)
SODIUM SERPL-SCNC: 135 MMOL/L (ref 136–145)
SP GR UR STRIP: 1.02 (ref 1–1.03)
SP02: 87
SP02: 92
SP02: 94
SP02: 95
TROPONIN I SERPL DL<=0.01 NG/ML-MCNC: 0.02 NG/ML (ref 0–0.03)
TROPONIN I SERPL DL<=0.01 NG/ML-MCNC: 0.14 NG/ML (ref 0–0.03)
URN SPEC COLLECT METH UR: ABNORMAL
UROBILINOGEN UR STRIP-ACNC: ABNORMAL EU/DL
VT: 500
WBC # BLD AUTO: 15.95 K/UL (ref 3.9–12.7)
WBC # BLD AUTO: 18.67 K/UL (ref 3.9–12.7)
WBC # BLD AUTO: 22.39 K/UL (ref 3.9–12.7)
WBC # BLD AUTO: 26.89 K/UL (ref 3.9–12.7)
WBC #/AREA URNS HPF: 4 /HPF (ref 0–5)
YEAST URNS QL MICRO: ABNORMAL

## 2020-01-01 PROCEDURE — 25000003 PHARM REV CODE 250: Mod: HCNC | Performed by: INTERNAL MEDICINE

## 2020-01-01 PROCEDURE — 99999 PR PBB SHADOW E&M-EST. PATIENT-LVL V: CPT | Mod: PBBFAC,HCNC,, | Performed by: PHYSICIAN ASSISTANT

## 2020-01-01 PROCEDURE — 63600175 PHARM REV CODE 636 W HCPCS: Mod: HCNC | Performed by: EMERGENCY MEDICINE

## 2020-01-01 PROCEDURE — 84484 ASSAY OF TROPONIN QUANT: CPT | Mod: 91,HCNC

## 2020-01-01 PROCEDURE — 99499 UNLISTED E&M SERVICE: CPT | Mod: S$GLB,,, | Performed by: PHYSICIAN ASSISTANT

## 2020-01-01 PROCEDURE — 25000003 PHARM REV CODE 250: Mod: HCNC | Performed by: HOSPITALIST

## 2020-01-01 PROCEDURE — 63600175 PHARM REV CODE 636 W HCPCS: Mod: HCNC

## 2020-01-01 PROCEDURE — 25000003 PHARM REV CODE 250: Mod: HCNC

## 2020-01-01 PROCEDURE — 83735 ASSAY OF MAGNESIUM: CPT | Mod: 91,HCNC

## 2020-01-01 PROCEDURE — 99900026 HC AIRWAY MAINTENANCE (STAT): Mod: HCNC

## 2020-01-01 PROCEDURE — 25000003 PHARM REV CODE 250: Mod: HCNC | Performed by: ANESTHESIOLOGY

## 2020-01-01 PROCEDURE — 82803 BLOOD GASES ANY COMBINATION: CPT | Mod: HCNC

## 2020-01-01 PROCEDURE — 3074F SYST BP LT 130 MM HG: CPT | Mod: HCNC,CPTII,S$GLB, | Performed by: PHYSICIAN ASSISTANT

## 2020-01-01 PROCEDURE — 63600175 PHARM REV CODE 636 W HCPCS: Mod: HCNC | Performed by: INTERNAL MEDICINE

## 2020-01-01 PROCEDURE — 94761 N-INVAS EAR/PLS OXIMETRY MLT: CPT | Mod: HCNC

## 2020-01-01 PROCEDURE — 63600175 PHARM REV CODE 636 W HCPCS: Mod: HCNC | Performed by: HOSPITALIST

## 2020-01-01 PROCEDURE — 93005 ELECTROCARDIOGRAM TRACING: CPT | Mod: HCNC

## 2020-01-01 PROCEDURE — 83605 ASSAY OF LACTIC ACID: CPT | Mod: HCNC

## 2020-01-01 PROCEDURE — 37000009 HC ANESTHESIA EA ADD 15 MINS: Mod: HCNC | Performed by: INTERNAL MEDICINE

## 2020-01-01 PROCEDURE — 99291 CRITICAL CARE FIRST HOUR: CPT | Mod: 25,HCNC

## 2020-01-01 PROCEDURE — S0030 INJECTION, METRONIDAZOLE: HCPCS | Mod: HCNC | Performed by: EMERGENCY MEDICINE

## 2020-01-01 PROCEDURE — 93010 ELECTROCARDIOGRAM REPORT: CPT | Mod: HCNC,,, | Performed by: INTERNAL MEDICINE

## 2020-01-01 PROCEDURE — 96366 THER/PROPH/DIAG IV INF ADDON: CPT | Mod: HCNC

## 2020-01-01 PROCEDURE — 36620 INSERTION CATHETER ARTERY: CPT | Mod: HCNC,,, | Performed by: ANESTHESIOLOGY

## 2020-01-01 PROCEDURE — 99900035 HC TECH TIME PER 15 MIN (STAT): Mod: HCNC

## 2020-01-01 PROCEDURE — 27000221 HC OXYGEN, UP TO 24 HOURS: Mod: HCNC

## 2020-01-01 PROCEDURE — 85007 BL SMEAR W/DIFF WBC COUNT: CPT | Mod: HCNC

## 2020-01-01 PROCEDURE — 43264 ERCP REMOVE DUCT CALCULI: CPT | Mod: HCNC | Performed by: INTERNAL MEDICINE

## 2020-01-01 PROCEDURE — 37000008 HC ANESTHESIA 1ST 15 MINUTES: Mod: HCNC | Performed by: INTERNAL MEDICINE

## 2020-01-01 PROCEDURE — 25000003 PHARM REV CODE 250: Mod: HCNC | Performed by: EMERGENCY MEDICINE

## 2020-01-01 PROCEDURE — 36600 WITHDRAWAL OF ARTERIAL BLOOD: CPT | Mod: HCNC

## 2020-01-01 PROCEDURE — 90945 DIALYSIS ONE EVALUATION: CPT | Mod: HCNC

## 2020-01-01 PROCEDURE — 83880 ASSAY OF NATRIURETIC PEPTIDE: CPT | Mod: HCNC

## 2020-01-01 PROCEDURE — 93010 EKG 12-LEAD: ICD-10-PCS | Mod: HCNC,,, | Performed by: INTERNAL MEDICINE

## 2020-01-01 PROCEDURE — 80053 COMPREHEN METABOLIC PANEL: CPT | Mod: HCNC

## 2020-01-01 PROCEDURE — 85730 THROMBOPLASTIN TIME PARTIAL: CPT | Mod: HCNC

## 2020-01-01 PROCEDURE — 27201674 HC SPHINCTERTOME: Mod: HCNC | Performed by: INTERNAL MEDICINE

## 2020-01-01 PROCEDURE — 80069 RENAL FUNCTION PANEL: CPT | Mod: HCNC

## 2020-01-01 PROCEDURE — 83036 HEMOGLOBIN GLYCOSYLATED A1C: CPT | Mod: HCNC

## 2020-01-01 PROCEDURE — 87340 HEPATITIS B SURFACE AG IA: CPT | Mod: HCNC

## 2020-01-01 PROCEDURE — 82962 GLUCOSE BLOOD TEST: CPT | Mod: HCNC

## 2020-01-01 PROCEDURE — 86706 HEP B SURFACE ANTIBODY: CPT | Mod: HCNC

## 2020-01-01 PROCEDURE — 85610 PROTHROMBIN TIME: CPT | Mod: HCNC

## 2020-01-01 PROCEDURE — 83605 ASSAY OF LACTIC ACID: CPT | Mod: 91,HCNC

## 2020-01-01 PROCEDURE — 63600175 PHARM REV CODE 636 W HCPCS: Mod: HCNC | Performed by: NURSE ANESTHETIST, CERTIFIED REGISTERED

## 2020-01-01 PROCEDURE — 87186 SC STD MICRODIL/AGAR DIL: CPT | Mod: HCNC

## 2020-01-01 PROCEDURE — 27202125 HC BALLOON, EXTRACTION (ANY): Mod: HCNC | Performed by: INTERNAL MEDICINE

## 2020-01-01 PROCEDURE — 84100 ASSAY OF PHOSPHORUS: CPT | Mod: HCNC

## 2020-01-01 PROCEDURE — 87077 CULTURE AEROBIC IDENTIFY: CPT | Mod: HCNC

## 2020-01-01 PROCEDURE — D9220A PRA ANESTHESIA: ICD-10-PCS | Mod: HCNC,ANES,, | Performed by: ANESTHESIOLOGY

## 2020-01-01 PROCEDURE — 25000003 PHARM REV CODE 250: Mod: HCNC | Performed by: NURSE ANESTHETIST, CERTIFIED REGISTERED

## 2020-01-01 PROCEDURE — 96361 HYDRATE IV INFUSION ADD-ON: CPT | Mod: HCNC

## 2020-01-01 PROCEDURE — 80053 COMPREHEN METABOLIC PANEL: CPT | Mod: 91,HCNC

## 2020-01-01 PROCEDURE — 85025 COMPLETE CBC W/AUTO DIFF WBC: CPT | Mod: HCNC

## 2020-01-01 PROCEDURE — 82010 KETONE BODYS QUAN: CPT | Mod: HCNC

## 2020-01-01 PROCEDURE — 63600175 PHARM REV CODE 636 W HCPCS: Mod: HCNC | Performed by: ANESTHESIOLOGY

## 2020-01-01 PROCEDURE — 83690 ASSAY OF LIPASE: CPT | Mod: HCNC

## 2020-01-01 PROCEDURE — 94002 VENT MGMT INPAT INIT DAY: CPT | Mod: HCNC

## 2020-01-01 PROCEDURE — 63600175 PHARM REV CODE 636 W HCPCS: Mod: JG,HCNC

## 2020-01-01 PROCEDURE — 87040 BLOOD CULTURE FOR BACTERIA: CPT | Mod: 59,HCNC

## 2020-01-01 PROCEDURE — 80100008 HC CRRT DAILY MAINTENANCE: Mod: HCNC

## 2020-01-01 PROCEDURE — 96375 TX/PRO/DX INJ NEW DRUG ADDON: CPT | Mod: 59,HCNC

## 2020-01-01 PROCEDURE — 99291 CRITICAL CARE FIRST HOUR: CPT | Mod: HCNC,,, | Performed by: INTERNAL MEDICINE

## 2020-01-01 PROCEDURE — 99499 RISK ADDL DX/OHS AUDIT: ICD-10-PCS | Mod: S$GLB,,, | Performed by: PHYSICIAN ASSISTANT

## 2020-01-01 PROCEDURE — 96365 THER/PROPH/DIAG IV INF INIT: CPT | Mod: HCNC

## 2020-01-01 PROCEDURE — 85025 COMPLETE CBC W/AUTO DIFF WBC: CPT | Mod: 91,HCNC

## 2020-01-01 PROCEDURE — D9220A PRA ANESTHESIA: Mod: HCNC,ANES,, | Performed by: ANESTHESIOLOGY

## 2020-01-01 PROCEDURE — 27202415 HC CARTRIDGE, CRRT: Mod: HCNC

## 2020-01-01 PROCEDURE — 99999 PR PBB SHADOW E&M-EST. PATIENT-LVL V: ICD-10-PCS | Mod: PBBFAC,HCNC,, | Performed by: PHYSICIAN ASSISTANT

## 2020-01-01 PROCEDURE — 20000000 HC ICU ROOM: Mod: HCNC

## 2020-01-01 PROCEDURE — 80150 ASSAY OF AMIKACIN: CPT | Mod: HCNC

## 2020-01-01 PROCEDURE — C1751 CATH, INF, PER/CENT/MIDLINE: HCPCS | Mod: HCNC | Performed by: ANESTHESIOLOGY

## 2020-01-01 PROCEDURE — 36620 ARTERIAL: ICD-10-PCS | Mod: HCNC,,, | Performed by: ANESTHESIOLOGY

## 2020-01-01 PROCEDURE — 3079F PR MOST RECENT DIASTOLIC BLOOD PRESSURE 80-89 MM HG: ICD-10-PCS | Mod: HCNC,CPTII,S$GLB, | Performed by: PHYSICIAN ASSISTANT

## 2020-01-01 PROCEDURE — 43262 ENDO CHOLANGIOPANCREATOGRAPH: CPT | Mod: HCNC | Performed by: INTERNAL MEDICINE

## 2020-01-01 PROCEDURE — 81000 URINALYSIS NONAUTO W/SCOPE: CPT | Mod: HCNC

## 2020-01-01 PROCEDURE — 3079F DIAST BP 80-89 MM HG: CPT | Mod: HCNC,CPTII,S$GLB, | Performed by: PHYSICIAN ASSISTANT

## 2020-01-01 PROCEDURE — 85027 COMPLETE CBC AUTOMATED: CPT | Mod: HCNC

## 2020-01-01 PROCEDURE — 84484 ASSAY OF TROPONIN QUANT: CPT | Mod: HCNC

## 2020-01-01 PROCEDURE — C1769 GUIDE WIRE: HCPCS | Mod: HCNC | Performed by: INTERNAL MEDICINE

## 2020-01-01 PROCEDURE — 36415 COLL VENOUS BLD VENIPUNCTURE: CPT | Mod: HCNC

## 2020-01-01 PROCEDURE — P9047 ALBUMIN (HUMAN), 25%, 50ML: HCPCS | Mod: HCNC | Performed by: HOSPITALIST

## 2020-01-01 PROCEDURE — 37799 UNLISTED PX VASCULAR SURGERY: CPT | Mod: HCNC

## 2020-01-01 PROCEDURE — 82330 ASSAY OF CALCIUM: CPT | Mod: HCNC

## 2020-01-01 PROCEDURE — G0439 PPPS, SUBSEQ VISIT: HCPCS | Mod: HCNC,S$GLB,, | Performed by: PHYSICIAN ASSISTANT

## 2020-01-01 PROCEDURE — 99291 PR CRITICAL CARE, E/M 30-74 MINUTES: ICD-10-PCS | Mod: HCNC,,, | Performed by: INTERNAL MEDICINE

## 2020-01-01 PROCEDURE — 3074F PR MOST RECENT SYSTOLIC BLOOD PRESSURE < 130 MM HG: ICD-10-PCS | Mod: HCNC,CPTII,S$GLB, | Performed by: PHYSICIAN ASSISTANT

## 2020-01-01 PROCEDURE — C9113 INJ PANTOPRAZOLE SODIUM, VIA: HCPCS | Mod: HCNC | Performed by: EMERGENCY MEDICINE

## 2020-01-01 PROCEDURE — D9220A PRA ANESTHESIA: Mod: HCNC,CRNA,, | Performed by: NURSE ANESTHETIST, CERTIFIED REGISTERED

## 2020-01-01 PROCEDURE — D9220A PRA ANESTHESIA: ICD-10-PCS | Mod: HCNC,CRNA,, | Performed by: NURSE ANESTHETIST, CERTIFIED REGISTERED

## 2020-01-01 PROCEDURE — S0028 INJECTION, FAMOTIDINE, 20 MG: HCPCS | Mod: HCNC | Performed by: HOSPITALIST

## 2020-01-01 PROCEDURE — 83735 ASSAY OF MAGNESIUM: CPT | Mod: HCNC

## 2020-01-01 PROCEDURE — 94003 VENT MGMT INPAT SUBQ DAY: CPT | Mod: HCNC

## 2020-01-01 PROCEDURE — 36556 INSERT NON-TUNNEL CV CATH: CPT | Mod: HCNC

## 2020-01-01 PROCEDURE — 80048 BASIC METABOLIC PNL TOTAL CA: CPT | Mod: HCNC

## 2020-01-01 PROCEDURE — G0439 PR MEDICARE ANNUAL WELLNESS SUBSEQUENT VISIT: ICD-10-PCS | Mod: HCNC,S$GLB,, | Performed by: PHYSICIAN ASSISTANT

## 2020-01-01 PROCEDURE — P9047 ALBUMIN (HUMAN), 25%, 50ML: HCPCS | Mod: HCNC

## 2020-01-01 RX ORDER — PHENYLEPHRINE HCL IN 0.9% NACL 1 MG/10 ML
SYRINGE (ML) INTRAVENOUS
Status: DISPENSED
Start: 2020-01-01 | End: 2020-01-01

## 2020-01-01 RX ORDER — METRONIDAZOLE 500 MG/100ML
500 INJECTION, SOLUTION INTRAVENOUS
Status: DISCONTINUED | OUTPATIENT
Start: 2020-01-01 | End: 2020-01-01

## 2020-01-01 RX ORDER — PHENYLEPHRINE HCL IN 0.9% NACL 1 MG/10 ML
SYRINGE (ML) INTRAVENOUS
Status: COMPLETED
Start: 2020-01-01 | End: 2020-01-01

## 2020-01-01 RX ORDER — PHENYLEPHRINE HYDROCHLORIDE 10 MG/ML
INJECTION INTRAVENOUS
Status: DISCONTINUED | OUTPATIENT
Start: 2020-01-01 | End: 2020-01-01

## 2020-01-01 RX ORDER — NOREPINEPHRINE BITARTRATE/D5W 4MG/250ML
PLASTIC BAG, INJECTION (ML) INTRAVENOUS
Status: DISPENSED
Start: 2020-01-01 | End: 2020-01-01

## 2020-01-01 RX ORDER — CIPROFLOXACIN 2 MG/ML
400 INJECTION, SOLUTION INTRAVENOUS
Status: DISCONTINUED | OUTPATIENT
Start: 2020-01-01 | End: 2020-01-01 | Stop reason: HOSPADM

## 2020-01-01 RX ORDER — FENTANYL CITRATE 50 UG/ML
INJECTION, SOLUTION INTRAMUSCULAR; INTRAVENOUS
Status: DISCONTINUED | OUTPATIENT
Start: 2020-01-01 | End: 2020-01-01

## 2020-01-01 RX ORDER — INDOMETHACIN 25 MG/1
CAPSULE ORAL
Status: COMPLETED
Start: 2020-01-01 | End: 2020-01-01

## 2020-01-01 RX ORDER — SODIUM CHLORIDE 0.9 % (FLUSH) 0.9 %
10 SYRINGE (ML) INJECTION
Status: DISCONTINUED | OUTPATIENT
Start: 2020-01-01 | End: 2020-01-01 | Stop reason: HOSPADM

## 2020-01-01 RX ORDER — INDOMETHACIN 25 MG/1
CAPSULE ORAL
Status: DISPENSED
Start: 2020-01-01 | End: 2020-01-01

## 2020-01-01 RX ORDER — ROCURONIUM BROMIDE 10 MG/ML
INJECTION, SOLUTION INTRAVENOUS
Status: DISCONTINUED | OUTPATIENT
Start: 2020-01-01 | End: 2020-01-01

## 2020-01-01 RX ORDER — CIPROFLOXACIN 2 MG/ML
400 INJECTION, SOLUTION INTRAVENOUS
Status: DISCONTINUED | OUTPATIENT
Start: 2020-01-01 | End: 2020-01-01

## 2020-01-01 RX ORDER — PROPOFOL 10 MG/ML
INJECTION, EMULSION INTRAVENOUS
Status: COMPLETED
Start: 2020-01-01 | End: 2020-01-01

## 2020-01-01 RX ORDER — PANTOPRAZOLE SODIUM 40 MG/10ML
80 INJECTION, POWDER, LYOPHILIZED, FOR SOLUTION INTRAVENOUS
Status: COMPLETED | OUTPATIENT
Start: 2020-01-01 | End: 2020-01-01

## 2020-01-01 RX ORDER — FENTANYL CITRATE-0.9 % NACL/PF 10 MCG/ML
25 PLASTIC BAG, INJECTION (ML) INTRAVENOUS CONTINUOUS
Status: DISCONTINUED | OUTPATIENT
Start: 2020-01-01 | End: 2020-01-01 | Stop reason: HOSPADM

## 2020-01-01 RX ORDER — ALBUTEROL SULFATE 90 UG/1
AEROSOL, METERED RESPIRATORY (INHALATION)
Qty: 18 G | Refills: 2 | Status: SHIPPED | OUTPATIENT
Start: 2020-01-01

## 2020-01-01 RX ORDER — ALBUMIN HUMAN 250 G/1000ML
12.5 SOLUTION INTRAVENOUS ONCE
Status: COMPLETED | OUTPATIENT
Start: 2020-01-01 | End: 2020-01-01

## 2020-01-01 RX ORDER — HYDROMORPHONE HYDROCHLORIDE 2 MG/ML
0.5 INJECTION, SOLUTION INTRAMUSCULAR; INTRAVENOUS; SUBCUTANEOUS
Status: DISCONTINUED | OUTPATIENT
Start: 2020-01-01 | End: 2020-01-01 | Stop reason: HOSPADM

## 2020-01-01 RX ORDER — ALBUMIN HUMAN 250 G/1000ML
SOLUTION INTRAVENOUS
Status: COMPLETED
Start: 2020-01-01 | End: 2020-01-01

## 2020-01-01 RX ORDER — MAGNESIUM SULFATE HEPTAHYDRATE 40 MG/ML
2 INJECTION, SOLUTION INTRAVENOUS
Status: DISCONTINUED | OUTPATIENT
Start: 2020-01-01 | End: 2020-01-01

## 2020-01-01 RX ORDER — SODIUM CHLORIDE, SODIUM LACTATE, POTASSIUM CHLORIDE, CALCIUM CHLORIDE 600; 310; 30; 20 MG/100ML; MG/100ML; MG/100ML; MG/100ML
INJECTION, SOLUTION INTRAVENOUS CONTINUOUS PRN
Status: DISCONTINUED | OUTPATIENT
Start: 2020-01-01 | End: 2020-01-01

## 2020-01-01 RX ORDER — INDOMETHACIN 50 MG/1
100 SUPPOSITORY RECTAL ONCE
Status: COMPLETED | OUTPATIENT
Start: 2020-01-01 | End: 2020-01-01

## 2020-01-01 RX ORDER — SODIUM CHLORIDE 9 MG/ML
INJECTION, SOLUTION INTRAVENOUS CONTINUOUS
Status: DISCONTINUED | OUTPATIENT
Start: 2020-01-01 | End: 2020-01-01

## 2020-01-01 RX ORDER — ROCURONIUM BROMIDE 10 MG/ML
INJECTION, SOLUTION INTRAVENOUS
Status: DISPENSED
Start: 2020-01-01 | End: 2020-01-01

## 2020-01-01 RX ORDER — INDOMETHACIN 25 MG/1
150 CAPSULE ORAL ONCE
Status: COMPLETED | OUTPATIENT
Start: 2020-01-01 | End: 2020-01-01

## 2020-01-01 RX ORDER — METRONIDAZOLE 500 MG/100ML
500 INJECTION, SOLUTION INTRAVENOUS
Status: COMPLETED | OUTPATIENT
Start: 2020-01-01 | End: 2020-01-01

## 2020-01-01 RX ORDER — HYDROCODONE BITARTRATE AND ACETAMINOPHEN 500; 5 MG/1; MG/1
TABLET ORAL CONTINUOUS
Status: DISCONTINUED | OUTPATIENT
Start: 2020-01-01 | End: 2020-01-01

## 2020-01-01 RX ORDER — HYDROMORPHONE HYDROCHLORIDE 2 MG/ML
0.5 INJECTION, SOLUTION INTRAMUSCULAR; INTRAVENOUS; SUBCUTANEOUS
Status: COMPLETED | OUTPATIENT
Start: 2020-01-01 | End: 2020-01-01

## 2020-01-01 RX ORDER — INDOMETHACIN 25 MG/1
100 CAPSULE ORAL ONCE
Status: COMPLETED | OUTPATIENT
Start: 2020-01-01 | End: 2020-01-01

## 2020-01-01 RX ORDER — ALBUMIN HUMAN 250 G/1000ML
25 SOLUTION INTRAVENOUS ONCE
Status: COMPLETED | OUTPATIENT
Start: 2020-01-01 | End: 2020-01-01

## 2020-01-01 RX ORDER — GLUCAGON 1 MG
1 KIT INJECTION
Status: DISCONTINUED | OUTPATIENT
Start: 2020-01-01 | End: 2020-01-01

## 2020-01-01 RX ORDER — PHENYLEPHRINE HCL IN 0.9% NACL 1 MG/10 ML
100 SYRINGE (ML) INTRAVENOUS ONCE
Status: COMPLETED | OUTPATIENT
Start: 2020-01-01 | End: 2020-01-01

## 2020-01-01 RX ORDER — NOREPINEPHRINE BITARTRATE/D5W 4MG/250ML
0.04 PLASTIC BAG, INJECTION (ML) INTRAVENOUS CONTINUOUS
Status: DISCONTINUED | OUTPATIENT
Start: 2020-01-01 | End: 2020-01-01

## 2020-01-01 RX ORDER — FAMOTIDINE 10 MG/ML
20 INJECTION INTRAVENOUS DAILY
Status: DISCONTINUED | OUTPATIENT
Start: 2020-01-01 | End: 2020-01-01 | Stop reason: HOSPADM

## 2020-01-01 RX ORDER — HEPARIN SODIUM 5000 [USP'U]/ML
5000 INJECTION, SOLUTION INTRAVENOUS; SUBCUTANEOUS EVERY 8 HOURS
Status: DISCONTINUED | OUTPATIENT
Start: 2020-01-01 | End: 2020-01-01 | Stop reason: HOSPADM

## 2020-01-01 RX ORDER — MEROPENEM AND SODIUM CHLORIDE 1 G/50ML
1 INJECTION, SOLUTION INTRAVENOUS
Status: DISCONTINUED | OUTPATIENT
Start: 2020-01-01 | End: 2020-01-01

## 2020-01-01 RX ORDER — ROCURONIUM BROMIDE 10 MG/ML
INJECTION, SOLUTION INTRAVENOUS
Status: COMPLETED
Start: 2020-01-01 | End: 2020-01-01

## 2020-01-01 RX ORDER — INDOMETHACIN 25 MG/1
50 CAPSULE ORAL ONCE
Status: COMPLETED | OUTPATIENT
Start: 2020-01-01 | End: 2020-01-01

## 2020-01-01 RX ORDER — SUCCINYLCHOLINE CHLORIDE 20 MG/ML
INJECTION INTRAMUSCULAR; INTRAVENOUS
Status: DISCONTINUED | OUTPATIENT
Start: 2020-01-01 | End: 2020-01-01

## 2020-01-01 RX ORDER — GLUCAGON 1 MG
KIT INJECTION
Status: COMPLETED
Start: 2020-01-01 | End: 2020-01-01

## 2020-01-01 RX ORDER — ONDANSETRON 2 MG/ML
4 INJECTION INTRAMUSCULAR; INTRAVENOUS EVERY 8 HOURS PRN
Status: DISCONTINUED | OUTPATIENT
Start: 2020-01-01 | End: 2020-01-01 | Stop reason: HOSPADM

## 2020-01-01 RX ORDER — INSULIN ASPART 100 [IU]/ML
1-10 INJECTION, SOLUTION INTRAVENOUS; SUBCUTANEOUS EVERY 6 HOURS PRN
Status: DISCONTINUED | OUTPATIENT
Start: 2020-01-01 | End: 2020-01-01

## 2020-01-01 RX ORDER — SODIUM CHLORIDE, SODIUM LACTATE, POTASSIUM CHLORIDE, CALCIUM CHLORIDE 600; 310; 30; 20 MG/100ML; MG/100ML; MG/100ML; MG/100ML
INJECTION, SOLUTION INTRAVENOUS CONTINUOUS
Status: DISCONTINUED | OUTPATIENT
Start: 2020-01-01 | End: 2020-01-01

## 2020-01-01 RX ORDER — INDOMETHACIN 50 MG/1
100 SUPPOSITORY RECTAL ONCE
Status: DISCONTINUED | OUTPATIENT
Start: 2020-01-01 | End: 2020-01-01

## 2020-01-01 RX ORDER — CIPROFLOXACIN 2 MG/ML
400 INJECTION, SOLUTION INTRAVENOUS
Status: COMPLETED | OUTPATIENT
Start: 2020-01-01 | End: 2020-01-01

## 2020-01-01 RX ORDER — CHLORHEXIDINE GLUCONATE ORAL RINSE 1.2 MG/ML
15 SOLUTION DENTAL 2 TIMES DAILY
Status: DISCONTINUED | OUTPATIENT
Start: 2020-01-01 | End: 2020-01-01 | Stop reason: HOSPADM

## 2020-01-01 RX ORDER — ONDANSETRON 2 MG/ML
4 INJECTION INTRAMUSCULAR; INTRAVENOUS
Status: DISPENSED | OUTPATIENT
Start: 2020-01-01 | End: 2020-01-01

## 2020-01-01 RX ORDER — FENTANYL CITRATE 50 UG/ML
INJECTION, SOLUTION INTRAMUSCULAR; INTRAVENOUS
Status: COMPLETED
Start: 2020-01-01 | End: 2020-01-01

## 2020-01-01 RX ORDER — PROPOFOL 10 MG/ML
5 INJECTION, EMULSION INTRAVENOUS CONTINUOUS
Status: DISCONTINUED | OUTPATIENT
Start: 2020-01-01 | End: 2020-01-01 | Stop reason: HOSPADM

## 2020-01-01 RX ORDER — EPHEDRINE SULFATE 50 MG/ML
INJECTION, SOLUTION INTRAVENOUS
Status: DISCONTINUED
Start: 2020-01-01 | End: 2020-01-01 | Stop reason: WASHOUT

## 2020-01-01 RX ORDER — ETOMIDATE 2 MG/ML
INJECTION INTRAVENOUS
Status: DISCONTINUED | OUTPATIENT
Start: 2020-01-01 | End: 2020-01-01

## 2020-01-01 RX ADMIN — NOREPINEPHRINE BITARTRATE 3 MCG/KG/MIN: 1 INJECTION, SOLUTION, CONCENTRATE INTRAVENOUS at 06:03

## 2020-01-01 RX ADMIN — SODIUM CHLORIDE, SODIUM LACTATE, POTASSIUM CHLORIDE, AND CALCIUM CHLORIDE: .6; .31; .03; .02 INJECTION, SOLUTION INTRAVENOUS at 12:03

## 2020-01-01 RX ADMIN — SODIUM BICARBONATE: 84 INJECTION, SOLUTION INTRAVENOUS at 05:03

## 2020-01-01 RX ADMIN — GLUCAGON 1 MG: 1 INJECTION, POWDER, LYOPHILIZED, FOR SOLUTION INTRAMUSCULAR; INTRAVENOUS at 01:03

## 2020-01-01 RX ADMIN — VASOPRESSIN 0.04 UNITS/MIN: 20 INJECTION INTRAVENOUS at 02:03

## 2020-01-01 RX ADMIN — CALCIUM GLUCONATE 2000 MG: 98 INJECTION, SOLUTION INTRAVENOUS at 12:03

## 2020-01-01 RX ADMIN — AMIODARONE HYDROCHLORIDE 0.5 MG/MIN: 1.8 INJECTION, SOLUTION INTRAVENOUS at 05:03

## 2020-01-01 RX ADMIN — INSULIN ASPART 8 UNITS: 100 INJECTION, SOLUTION INTRAVENOUS; SUBCUTANEOUS at 03:03

## 2020-01-01 RX ADMIN — AMIODARONE HYDROCHLORIDE 0.5 MG/MIN: 1.8 INJECTION, SOLUTION INTRAVENOUS at 08:03

## 2020-01-01 RX ADMIN — PHENYLEPHRINE HYDROCHLORIDE 0.5 MCG/KG/MIN: 10 INJECTION INTRAVENOUS at 03:03

## 2020-01-01 RX ADMIN — Medication: at 01:03

## 2020-01-01 RX ADMIN — NOREPINEPHRINE BITARTRATE 3 MCG/KG/MIN: 1 INJECTION, SOLUTION, CONCENTRATE INTRAVENOUS at 03:03

## 2020-01-01 RX ADMIN — SODIUM CHLORIDE, SODIUM LACTATE, POTASSIUM CHLORIDE, AND CALCIUM CHLORIDE: .6; .31; .03; .02 INJECTION, SOLUTION INTRAVENOUS at 02:03

## 2020-01-01 RX ADMIN — PHENYLEPHRINE HYDROCHLORIDE 4 MCG/KG/MIN: 10 INJECTION INTRAVENOUS at 02:03

## 2020-01-01 RX ADMIN — ALBUMIN HUMAN 25 G: 250 SOLUTION INTRAVENOUS at 12:03

## 2020-01-01 RX ADMIN — AMIODARONE HYDROCHLORIDE 0.5 MG/MIN: 1.8 INJECTION, SOLUTION INTRAVENOUS at 12:03

## 2020-01-01 RX ADMIN — ALBUMIN (HUMAN) 25 G: 25 SOLUTION INTRAVENOUS at 12:03

## 2020-01-01 RX ADMIN — SODIUM BICARBONATE 150 MEQ: 84 INJECTION, SOLUTION INTRAVENOUS at 10:03

## 2020-01-01 RX ADMIN — ROCURONIUM BROMIDE 30 MG: 10 INJECTION, SOLUTION INTRAVENOUS at 01:03

## 2020-01-01 RX ADMIN — INSULIN HUMAN 8 UNITS: 100 INJECTION, SOLUTION PARENTERAL at 08:03

## 2020-01-01 RX ADMIN — NOREPINEPHRINE BITARTRATE 0.4 MCG/KG/MIN: 1 INJECTION INTRAVENOUS at 05:03

## 2020-01-01 RX ADMIN — FENTANYL CITRATE 25 MCG: 50 INJECTION INTRAMUSCULAR; INTRAVENOUS at 01:03

## 2020-01-01 RX ADMIN — PHENYLEPHRINE HYDROCHLORIDE 2.5 MCG/KG/MIN: 10 INJECTION INTRAVENOUS at 11:03

## 2020-01-01 RX ADMIN — SODIUM CHLORIDE, SODIUM LACTATE, POTASSIUM CHLORIDE, AND CALCIUM CHLORIDE: .6; .31; .03; .02 INJECTION, SOLUTION INTRAVENOUS at 05:03

## 2020-01-01 RX ADMIN — CIPROFLOXACIN 400 MG: 2 INJECTION, SOLUTION INTRAVENOUS at 10:03

## 2020-01-01 RX ADMIN — ONDANSETRON HYDROCHLORIDE 4 MG: 2 SOLUTION INTRAMUSCULAR; INTRAVENOUS at 03:03

## 2020-01-01 RX ADMIN — NOREPINEPHRINE BITARTRATE 1.5 MCG/KG/MIN: 1 INJECTION, SOLUTION, CONCENTRATE INTRAVENOUS at 09:03

## 2020-01-01 RX ADMIN — Medication 0.3 MCG/KG/MIN: at 08:03

## 2020-01-01 RX ADMIN — SODIUM CHLORIDE 3000 ML: 0.9 INJECTION, SOLUTION INTRAVENOUS at 07:03

## 2020-01-01 RX ADMIN — SODIUM BICARBONATE: 84 INJECTION, SOLUTION INTRAVENOUS at 10:03

## 2020-01-01 RX ADMIN — ALBUMIN (HUMAN) 12.5 G: 25 SOLUTION INTRAVENOUS at 03:03

## 2020-01-01 RX ADMIN — HYDROMORPHONE HYDROCHLORIDE 0.5 MG: 2 INJECTION, SOLUTION INTRAMUSCULAR; INTRAVENOUS; SUBCUTANEOUS at 10:03

## 2020-01-01 RX ADMIN — CIPROFLOXACIN 400 MG: 2 INJECTION, SOLUTION INTRAVENOUS at 08:03

## 2020-01-01 RX ADMIN — NOREPINEPHRINE BITARTRATE 1.2 MCG/KG/MIN: 1 INJECTION INTRAVENOUS at 07:03

## 2020-01-01 RX ADMIN — PHENYLEPHRINE HYDROCHLORIDE 2.5 MCG/KG/MIN: 10 INJECTION INTRAVENOUS at 01:03

## 2020-01-01 RX ADMIN — PROPOFOL 20 MCG/KG/MIN: 10 INJECTION, EMULSION INTRAVENOUS at 01:03

## 2020-01-01 RX ADMIN — PHENYLEPHRINE HYDROCHLORIDE 200 MCG: 10 INJECTION INTRAVENOUS at 12:03

## 2020-01-01 RX ADMIN — Medication 0.04 MCG/KG/MIN: at 11:03

## 2020-01-01 RX ADMIN — AMIODARONE HYDROCHLORIDE 1 MG/MIN: 1.8 INJECTION, SOLUTION INTRAVENOUS at 11:03

## 2020-01-01 RX ADMIN — AMIODARONE HYDROCHLORIDE 1 MG/MIN: 1.8 INJECTION, SOLUTION INTRAVENOUS at 06:03

## 2020-01-01 RX ADMIN — CALCIUM GLUCONATE 1000 MG: 98 INJECTION, SOLUTION INTRAVENOUS at 07:03

## 2020-01-01 RX ADMIN — HYDROCORTISONE SODIUM SUCCINATE 100 MG: 100 INJECTION, POWDER, FOR SOLUTION INTRAMUSCULAR; INTRAVENOUS at 10:03

## 2020-01-01 RX ADMIN — CHLORHEXIDINE GLUCONATE 0.12% ORAL RINSE 15 ML: 1.2 LIQUID ORAL at 08:03

## 2020-01-01 RX ADMIN — INDOMETHACIN 150 MEQ: 25 CAPSULE ORAL at 10:03

## 2020-01-01 RX ADMIN — HYDROMORPHONE HYDROCHLORIDE 0.5 MG: 2 INJECTION, SOLUTION INTRAMUSCULAR; INTRAVENOUS; SUBCUTANEOUS at 09:03

## 2020-01-01 RX ADMIN — NOREPINEPHRINE BITARTRATE 2.05 MCG/KG/MIN: 1 INJECTION, SOLUTION, CONCENTRATE INTRAVENOUS at 11:03

## 2020-01-01 RX ADMIN — MEROPENEM 2 G: 1 INJECTION, POWDER, FOR SOLUTION INTRAVENOUS at 08:03

## 2020-01-01 RX ADMIN — CALCIUM GLUCONATE 1000 MG: 94 INJECTION, SOLUTION INTRAVENOUS at 04:03

## 2020-01-01 RX ADMIN — SODIUM CHLORIDE 19.1 UNITS/HR: 9 INJECTION, SOLUTION INTRAVENOUS at 04:03

## 2020-01-01 RX ADMIN — VASOPRESSIN 0.04 UNITS/MIN: 20 INJECTION INTRAVENOUS at 07:03

## 2020-01-01 RX ADMIN — HEPARIN SODIUM 5000 UNITS: 5000 INJECTION, SOLUTION INTRAVENOUS; SUBCUTANEOUS at 09:03

## 2020-01-01 RX ADMIN — HEPARIN SODIUM 5000 UNITS: 5000 INJECTION, SOLUTION INTRAVENOUS; SUBCUTANEOUS at 05:03

## 2020-01-01 RX ADMIN — HEPARIN SODIUM 5000 UNITS: 5000 INJECTION, SOLUTION INTRAVENOUS; SUBCUTANEOUS at 10:03

## 2020-01-01 RX ADMIN — PROPOFOL 5 MCG/KG/MIN: 10 INJECTION, EMULSION INTRAVENOUS at 11:03

## 2020-01-01 RX ADMIN — SODIUM BICARBONATE: 84 INJECTION, SOLUTION INTRAVENOUS at 07:03

## 2020-01-01 RX ADMIN — CALCIUM GLUCONATE 2000 MG: 98 INJECTION, SOLUTION INTRAVENOUS at 07:03

## 2020-01-01 RX ADMIN — AMIODARONE HYDROCHLORIDE 300 MG: 1.5 INJECTION, SOLUTION INTRAVENOUS at 06:03

## 2020-01-01 RX ADMIN — NOREPINEPHRINE BITARTRATE 3 MCG/KG/MIN: 1 INJECTION, SOLUTION, CONCENTRATE INTRAVENOUS at 12:03

## 2020-01-01 RX ADMIN — PHENYLEPHRINE HYDROCHLORIDE 200 MCG: 10 INJECTION INTRAVENOUS at 10:03

## 2020-01-01 RX ADMIN — SODIUM CHLORIDE, SODIUM LACTATE, POTASSIUM CHLORIDE, AND CALCIUM CHLORIDE: .6; .31; .03; .02 INJECTION, SOLUTION INTRAVENOUS at 01:03

## 2020-01-01 RX ADMIN — HEPARIN SODIUM 5000 UNITS: 5000 INJECTION, SOLUTION INTRAVENOUS; SUBCUTANEOUS at 06:03

## 2020-01-01 RX ADMIN — SODIUM BICARBONATE: 84 INJECTION, SOLUTION INTRAVENOUS at 06:03

## 2020-01-01 RX ADMIN — INSULIN ASPART 5 UNITS: 100 INJECTION, SOLUTION INTRAVENOUS; SUBCUTANEOUS at 12:03

## 2020-01-01 RX ADMIN — Medication 100 MCG: at 10:03

## 2020-01-01 RX ADMIN — NOREPINEPHRINE BITARTRATE 3 MCG/KG/MIN: 1 INJECTION, SOLUTION, CONCENTRATE INTRAVENOUS at 08:03

## 2020-01-01 RX ADMIN — SODIUM CHLORIDE 2 UNITS/HR: 9 INJECTION, SOLUTION INTRAVENOUS at 04:03

## 2020-01-01 RX ADMIN — HYDROCORTISONE SODIUM SUCCINATE 100 MG: 100 INJECTION, POWDER, FOR SOLUTION INTRAMUSCULAR; INTRAVENOUS at 09:03

## 2020-01-01 RX ADMIN — SODIUM CHLORIDE 13.55 UNITS/HR: 9 INJECTION, SOLUTION INTRAVENOUS at 10:03

## 2020-01-01 RX ADMIN — PHENYLEPHRINE HYDROCHLORIDE 2 MCG/KG/MIN: 10 INJECTION INTRAVENOUS at 12:03

## 2020-01-01 RX ADMIN — NOREPINEPHRINE BITARTRATE 3 MCG/KG/MIN: 1 INJECTION, SOLUTION, CONCENTRATE INTRAVENOUS at 01:03

## 2020-01-01 RX ADMIN — PHENYLEPHRINE HYDROCHLORIDE 5 MCG/KG/MIN: 10 INJECTION INTRAVENOUS at 02:03

## 2020-01-01 RX ADMIN — CHLORHEXIDINE GLUCONATE 0.12% ORAL RINSE 15 ML: 1.2 LIQUID ORAL at 10:03

## 2020-01-01 RX ADMIN — INDOMETHACIN 100 MG: 50 SUPPOSITORY RECTAL at 01:03

## 2020-01-01 RX ADMIN — SODIUM CHLORIDE 500 ML: 0.9 INJECTION, SOLUTION INTRAVENOUS at 10:03

## 2020-01-01 RX ADMIN — CALCIUM GLUCONATE 1000 MG: 94 INJECTION, SOLUTION INTRAVENOUS at 05:03

## 2020-01-01 RX ADMIN — CIPROFLOXACIN 400 MG: 2 INJECTION, SOLUTION INTRAVENOUS at 07:03

## 2020-01-01 RX ADMIN — PHENYLEPHRINE HYDROCHLORIDE 5 MCG/KG/MIN: 10 INJECTION INTRAVENOUS at 03:03

## 2020-01-01 RX ADMIN — CALCIUM GLUCONATE 1000 MG: 98 INJECTION, SOLUTION INTRAVENOUS at 10:03

## 2020-01-01 RX ADMIN — METRONIDAZOLE 500 MG: 500 INJECTION, SOLUTION INTRAVENOUS at 09:03

## 2020-01-01 RX ADMIN — SODIUM CHLORIDE 1000 ML: 0.9 INJECTION, SOLUTION INTRAVENOUS at 09:03

## 2020-01-01 RX ADMIN — PHENYLEPHRINE HYDROCHLORIDE 200 MCG: 10 INJECTION INTRAVENOUS at 01:03

## 2020-01-01 RX ADMIN — PHENYLEPHRINE HYDROCHLORIDE 2.5 MCG/KG/MIN: 10 INJECTION INTRAVENOUS at 09:03

## 2020-01-01 RX ADMIN — Medication 0.35 MCG/KG/MIN: at 09:03

## 2020-01-01 RX ADMIN — CALCIUM GLUCONATE 1000 MG: 94 INJECTION, SOLUTION INTRAVENOUS at 11:03

## 2020-01-01 RX ADMIN — MEROPENEM 2 G: 1 INJECTION, POWDER, FOR SOLUTION INTRAVENOUS at 09:03

## 2020-01-01 RX ADMIN — PHENYLEPHRINE HYDROCHLORIDE 200 MCG: 10 INJECTION INTRAVENOUS at 02:03

## 2020-01-01 RX ADMIN — AMIODARONE HYDROCHLORIDE 0.5 MG/MIN: 1.8 INJECTION, SOLUTION INTRAVENOUS at 09:03

## 2020-01-01 RX ADMIN — ROCURONIUM BROMIDE 20 MG: 10 INJECTION, SOLUTION INTRAVENOUS at 01:03

## 2020-01-01 RX ADMIN — HEPARIN SODIUM 5000 UNITS: 5000 INJECTION, SOLUTION INTRAVENOUS; SUBCUTANEOUS at 01:03

## 2020-01-01 RX ADMIN — FAMOTIDINE 20 MG: 10 INJECTION INTRAVENOUS at 09:03

## 2020-01-01 RX ADMIN — SUCCINYLCHOLINE CHLORIDE 140 MG: 20 INJECTION, SOLUTION INTRAMUSCULAR; INTRAVENOUS at 10:03

## 2020-01-01 RX ADMIN — PHENYLEPHRINE HYDROCHLORIDE 5 MCG/KG/MIN: 10 INJECTION INTRAVENOUS at 06:03

## 2020-01-01 RX ADMIN — MEROPENEM 2 G: 1 INJECTION, POWDER, FOR SOLUTION INTRAVENOUS at 10:03

## 2020-01-01 RX ADMIN — HYDROCORTISONE SODIUM SUCCINATE 100 MG: 100 INJECTION, POWDER, FOR SOLUTION INTRAMUSCULAR; INTRAVENOUS at 05:03

## 2020-01-01 RX ADMIN — SODIUM CHLORIDE 250 ML: 9 INJECTION, SOLUTION INTRAVENOUS at 10:03

## 2020-01-01 RX ADMIN — NOREPINEPHRINE BITARTRATE 0.25 MCG/KG/MIN: 1 INJECTION INTRAVENOUS at 11:03

## 2020-01-01 RX ADMIN — SODIUM BICARBONATE 100 MEQ: 84 INJECTION, SOLUTION INTRAVENOUS at 06:03

## 2020-01-01 RX ADMIN — Medication 0.04 MCG/KG/MIN: at 06:03

## 2020-01-01 RX ADMIN — SODIUM BICARBONATE 100 MEQ: 84 INJECTION, SOLUTION INTRAVENOUS at 02:03

## 2020-01-01 RX ADMIN — SODIUM BICARBONATE 50 MEQ: 84 INJECTION, SOLUTION INTRAVENOUS at 12:03

## 2020-01-01 RX ADMIN — PROPOFOL 5 MCG/KG/MIN: 10 INJECTION, EMULSION INTRAVENOUS at 03:03

## 2020-01-01 RX ADMIN — ROCURONIUM BROMIDE 30 MG: 10 INJECTION, SOLUTION INTRAVENOUS at 12:03

## 2020-01-01 RX ADMIN — PROPOFOL 20 MCG/KG/MIN: 10 INJECTION, EMULSION INTRAVENOUS at 09:03

## 2020-01-01 RX ADMIN — PANTOPRAZOLE SODIUM 80 MG: 40 INJECTION, POWDER, FOR SOLUTION INTRAVENOUS at 08:03

## 2020-01-01 RX ADMIN — NOREPINEPHRINE BITARTRATE 3 MCG/KG/MIN: 1 INJECTION, SOLUTION, CONCENTRATE INTRAVENOUS at 04:03

## 2020-01-01 RX ADMIN — NOREPINEPHRINE BITARTRATE 3 MCG/KG/MIN: 1 INJECTION, SOLUTION, CONCENTRATE INTRAVENOUS at 02:03

## 2020-01-01 RX ADMIN — AMIKACIN SULFATE 650 MG: 500 INJECTION, SOLUTION INTRAMUSCULAR; INTRAVENOUS at 04:03

## 2020-01-01 RX ADMIN — PHENYLEPHRINE HYDROCHLORIDE 2.5 MCG/KG/MIN: 10 INJECTION INTRAVENOUS at 12:03

## 2020-01-01 RX ADMIN — PHENYLEPHRINE HYDROCHLORIDE 0.5 MCG/KG/MIN: 10 INJECTION INTRAVENOUS at 11:03

## 2020-01-01 RX ADMIN — HYDROMORPHONE HYDROCHLORIDE 0.5 MG: 2 INJECTION, SOLUTION INTRAMUSCULAR; INTRAVENOUS; SUBCUTANEOUS at 07:03

## 2020-01-01 RX ADMIN — ETOMIDATE 10 MG: 2 INJECTION, SOLUTION INTRAVENOUS at 10:03

## 2020-01-20 PROBLEM — N18.30 CKD (CHRONIC KIDNEY DISEASE), STAGE III: Status: ACTIVE | Noted: 2020-01-01

## 2020-01-20 PROBLEM — M16.0 BILATERAL HIP JOINT ARTHRITIS: Status: RESOLVED | Noted: 2017-05-03 | Resolved: 2020-01-01

## 2020-01-20 NOTE — PATIENT INSTRUCTIONS
Counseling and Referral of Other Preventative  (Italic type indicates deductible and co-insurance are waived)    Patient Name: Bonifacio Peoples  Today's Date: 1/20/2020    Health Maintenance       Date Due Completion Date    TETANUS VACCINE 04/01/1970 ---    Shingles Vaccine (1 of 2) 04/01/2002 ---    Eye Exam 03/15/2020 3/15/2019    Lipid Panel 04/22/2020 4/22/2019    Hemoglobin A1c 05/01/2020 11/1/2019    Foot Exam 08/13/2020 8/13/2019    Override on 8/13/2019: Done    Fecal Occult Blood Test (FOBT)/[facility]Kit 08/14/2020 8/14/2019    High Dose Statin 01/20/2021 1/20/2020    Aspirin/Antiplatelet Therapy 01/20/2021 1/20/2020        Orders Placed This Encounter   Procedures    Lipid panel    PSA, Screening    Fecal Immunochemical Test (iFOBT)     The following information is provided to all patients.  This information is to help you find resources for any of the problems found today that may be affecting your health:                Living healthy guide: www.[facility].[place].gov      Understanding Diabetes: www.diabetes.org      Eating healthy: www.cdc.gov/healthyweight      [facility] home safety checklist: www.cdc.gov/steadi/patient.html      Agency on Aging: www.goea.[place].gov      Alcoholics anonymous (AA): www.aa.org      Physical Activity: www.jo-ann.nih.gov/ol4qjfw      Tobacco use: www.quitwithusla.org

## 2020-01-20 NOTE — PROGRESS NOTES
"Bonifacio Peoples presented for a  Medicare AWV and comprehensive Health Risk Assessment today. The following components were reviewed and updated:    · Medical history  · Family History  · Social history  · Allergies and Current Medications  · Health Risk Assessment  · Health Maintenance  · Care Team     ** See Completed Assessments for Annual Wellness Visit within the encounter summary.**       The following assessments were completed:  · Living Situation  · CAGE  · Depression Screening  · Timed Get Up and Go  · Whisper Test  · Cognitive Function Screening  · Nutrition Screening  · ADL Screening  · PAQ Screening      Vitals:    01/20/20 0907   BP: 122/80   Pulse: (!) 56   Resp: 16   Temp: 97.8 °F (36.6 °C)   TempSrc: Oral   SpO2: 97%   Weight: 109.2 kg (240 lb 11.9 oz)   Height: 5' 10.5" (1.791 m)     Body mass index is 34.05 kg/m².  Physical Exam      Diagnoses and health risks identified today and associated recommendations/orders:    1. CKD (chronic kidney disease), stage III  Continue to monitor.     2. Colon cancer screening  - Fecal Immunochemical Test (iFOBT); Future    3. History of cerebrovascular accident (CVA) with residual deficit  Stable     4. Moderate persistent asthma with acute exacerbation  The current medical regimen is effective;  continue present plan and medications.    5. Essential hypertension  The current medical regimen is effective;  continue present plan and medications.  - Lipid panel; Future    6. Mixed hyperlipidemia  The current medical regimen is effective;  continue present plan and medications.'  - Lipid panel; Future    7. Obesity (BMI 30.0-34.9)  Has silver sneakers. Encouraged to use more     8. Non-seasonal allergic rhinitis due to pollen  The current medical regimen is effective;  continue present plan and medications.    9. Bilateral carotid artery stenosis  Continue to control RF BP and lipids     10. Gastroesophageal reflux disease, esophagitis presence not specified  The " current medical regimen is effective;  continue present plan and medications.    11. Type 2 diabetes mellitus with diabetic neuropathy, without long-term current use of insulin  The current medical regimen is effective;  continue present plan and medications.    12. Restless leg syndrome, controlled  The current medical regimen is effective;  continue present plan and medications.    13. Mood disorder  The current medical regimen is effective;  continue present plan and medications.    14. History of kidney stones  No current issues    15. Prostate cancer screening  - PSA, Screening; Future    16. Encounter for preventive health examination  Stable doing well       Provided Bonifacio with a 5-10 year written screening schedule and personal prevention plan. Recommendations were developed using the USPSTF age appropriate recommendations. Education, counseling, and referrals were provided as needed. After Visit Summary printed and given to patient which includes a list of additional screenings\tests needed.    No follow-ups on file.    PINKY Strauss

## 2020-03-14 PROBLEM — N18.30 ACUTE RENAL FAILURE SUPERIMPOSED ON STAGE 3 CHRONIC KIDNEY DISEASE: Status: ACTIVE | Noted: 2020-01-01

## 2020-03-14 PROBLEM — K83.09 ASCENDING CHOLANGITIS: Status: ACTIVE | Noted: 2020-01-01

## 2020-03-14 PROBLEM — A41.9 SEPSIS WITH ACUTE ORGAN DYSFUNCTION AND SEPTIC SHOCK: Status: ACTIVE | Noted: 2020-01-01

## 2020-03-14 PROBLEM — N17.9 ACUTE RENAL FAILURE: Status: ACTIVE | Noted: 2020-01-01

## 2020-03-14 PROBLEM — E87.20 LACTIC ACIDOSIS: Status: ACTIVE | Noted: 2020-01-01

## 2020-03-14 PROBLEM — R65.21 SEPSIS WITH ACUTE ORGAN DYSFUNCTION AND SEPTIC SHOCK: Status: ACTIVE | Noted: 2020-01-01

## 2020-03-14 PROBLEM — K85.10 ACUTE BILIARY PANCREATITIS: Status: ACTIVE | Noted: 2020-01-01

## 2020-03-14 PROBLEM — K80.50 CHOLEDOCHOLITHIASIS: Status: ACTIVE | Noted: 2020-01-01

## 2020-03-14 NOTE — ED NOTES
"Received report from BRYAN Joaquin. Patient is on nonrebreather, very labored, and diaphoretic. Asking for pain medicine, stating stomach is "burning"  "

## 2020-03-14 NOTE — ED PROVIDER NOTES
"Encounter Date: 3/14/2020    SCRIBE #1 NOTE: I, Lesly Peterson, am scribing for, and in the presence of,  Dr. Dutta. I have scribed the following portions of the note - Other sections scribed: HPI, ROS.       History     Chief Complaint   Patient presents with    Abdominal Pain     Patient reports abdominal pain N/V X 1 day     Time of initial assessment: 6:26 AM    CC: Abdominal Pain    HPI:  This is a 69 y.o. Male, with a PMHx of Asthma, High Cholesterol, Hypertension, Diabetes Mellitus, CKD, and TIA, who presents to the Emergency Department with a cc of "severe" high bilateral upper abdominal pain x2 days. Associated symptoms include nausea, vomiting, and decreased appetite. Pt reports 3 episodes of vomiting within the last 24 hours. Approximately 6.5 hours ago, pt experienced abnormally heavy breathing and generalized weakness due to pain. Relative states, "he hasn't eaten anything in the past few days. He's in so much pain, it's hard to breathe." He has been tolerating liquids (water) P.O. He denies any diarrhea, dysuria, coughing, current SOB, fever, eye pain, ear pain, sore throat, headaches, arm/leg pain, or any CP. Pt takes Aspirin. He does not take Insulin. He has been noncompliant with medications for the last 2 days. Pt's blood glucose level was reportedly up to 420 this morning. Last dose of Metformin was 1.5 hours ago.    The history is provided by the patient and a relative. No  was used.     Review of patient's allergies indicates:   Allergen Reactions    Codeine      Childhood allergy, patient can't remember what happened.    Pcn [penicillins] Nausea And Vomiting    Zyrtec [cetirizine]      disorientated      Contrast media Rash     Pts wife states he is allergic to IV contrast "dye"    Iodine and iodide containing products Rash     IV dye contrast     Past Medical History:   Diagnosis Date    Anticoagulant long-term use     Arthritis     Rt and Lt wrist    Asthma, " mild intermittent, well-controlled     Cataract     CKD (chronic kidney disease), stage III 1/20/2020    Depression     Diabetes mellitus     High cholesterol     Hypertension     Renal manifestation of secondary diabetes mellitus     Respiratory distress     Had to give a breathing treatment during last cysto procedure  2/10/12    Stroke     TIA  x3    TIA (transient ischemic attack)     Type 2 diabetes mellitus     Type 2 diabetes mellitus with diabetic neuropathy, without long-term current use of insulin      Past Surgical History:   Procedure Laterality Date    CARDIAC SURGERY  2010, 2008    repair openings to heart    CYSTOSCOPY W/ LASER LITHOTRIPSY      2/2012 unsuccessful with stone removal. Ureteral stent placed    ganglion cyst Right     KNEE ARTHROSCOPY W/ LASER Right     x 2    right wrist repair      TONSILLECTOMY      as a child     Family History   Problem Relation Age of Onset    Diabetes Mother     No Known Problems Father     Diabetes Sister     Heart attack Sister     Heart disease Sister     Heart disease Paternal Grandfather      Social History     Tobacco Use    Smoking status: Never Smoker    Smokeless tobacco: Never Used   Substance Use Topics    Alcohol use: Yes     Comment: occassionally every 2-3 months     Drug use: No     Review of Systems   Constitutional: Positive for appetite change (decreased). Negative for chills, diaphoresis and fever.   HENT: Negative for ear pain and sore throat.    Eyes: Negative for pain.   Respiratory: Negative for cough and shortness of breath.         (+) heavy breathing   Cardiovascular: Negative for chest pain.   Gastrointestinal: Positive for abdominal pain (high bilateral upper), nausea and vomiting (x3 days). Negative for diarrhea.   Genitourinary: Negative for dysuria.   Musculoskeletal:        (-) arm/ leg pain   Skin: Negative for rash.   Neurological: Positive for weakness (generalized). Negative for headaches.    Psychiatric/Behavioral: Negative for confusion.       Physical Exam     Initial Vitals [03/14/20 0611]   BP Pulse Resp Temp SpO2   98/64 (!) 124 20 98.6 °F (37 °C) 99 %      MAP       --         Physical Exam  The patient was examined specifically for the following:   General:No significant distress, Good color, Warm and dry. Head and neck:Scalp atraumatic, Neck supple. Neurological:Appropriate conversation, Gross motor deficits. Eyes:Conjugate gaze, Clear corneas. ENT: No epistaxis. Cardiac: Regular rate and rhythm, Grossly normal heart tones. Pulmonary: Wheezing, Rales. Gastrointestinal: Abdominal tenderness, Abdominal distention. Musculoskeletal: Extremity deformity, Apparent pain with range of motion of the joints. Skin: Rash.   The findings on examination were normal except for the following:  The patient is tachycardic with a heart rate of 124.  His blood pressure is 98/64.  His temperature is 98.6°.  The abdomen is diffusely tender in the upper quadrants bilaterally.  The patient is not warm to touch.  He is cool and diaphoretic.  ED Course   Critical Care  Date/Time: 3/14/2020 11:04 AM  Performed by: Duncan Dutta MD  Authorized by: Duncan Dutta MD   Direct patient critical care time: 22 minutes  Additional history critical care time: 11 minutes  Ordering / reviewing critical care time: 11 minutes  Documentation critical care time: 11 minutes  Consulting other physicians critical care time: 11 minutes  Total critical care time (exclusive of procedural time) : 66 minutes  Critical care time was exclusive of separately billable procedures and treating other patients and teaching time.  Critical care was necessary to treat or prevent imminent or life-threatening deterioration of the following conditions: circulatory failure, metabolic crisis, sepsis and shock.  Critical care was time spent personally by me on the following activities: discussions with consultants, evaluation of patient's response to  treatment, obtaining history from patient or surrogate, ordering and review of laboratory studies, pulse oximetry, review of old charts, development of treatment plan with patient or surrogate, discussions with primary provider, examination of patient, ordering and performing treatments and interventions, ordering and review of radiographic studies and re-evaluation of patient's condition.    Central Line  Date/Time: 3/14/2020 11:05 AM  Performed by: Duncan Dutta MD  Indications: med administration, vascular access and hemodynamic monitoring  Anesthesia: local infiltration    Anesthesia:  Local Anesthetic: lidocaine 1% without epinephrine  Preparation: skin prepped with ChloraPrep  Location details: right internal jugular  Catheter size: 7 Fr  Number of attempts: 1  Assessment: placement verified by x-ray and no pneumothorax on x-ray  Post-procedure: line sutured        Labs Reviewed   COMPREHENSIVE METABOLIC PANEL - Abnormal; Notable for the following components:       Result Value    Sodium 135 (*)     CO2 14 (*)     Glucose 423 (*)     BUN, Bld 31 (*)     Creatinine 2.2 (*)     Calcium 6.9 (*)     Albumin 3.3 (*)     Total Bilirubin 9.7 (*)     Alkaline Phosphatase 218 (*)      (*)      (*)     Anion Gap 22 (*)     eGFR if  35 (*)     eGFR if non  30 (*)     All other components within normal limits    Narrative:     Calcium critical result(s) called and verbal readback obtained from   Clari Azul  by NOEMY 03/14/2020 08:27   CBC W/ AUTO DIFFERENTIAL - Abnormal; Notable for the following components:    WBC 26.89 (*)     Hemoglobin 18.7 (*)     Hematocrit 56.6 (*)     Immature Granulocytes 1.4 (*)     Gran # (ANC) 23.6 (*)     Immature Grans (Abs) 0.37 (*)     Lymph # 0.9 (*)     Mono # 1.9 (*)     Gran% 87.5 (*)     Lymph% 3.2 (*)     All other components within normal limits   LIPASE - Abnormal; Notable for the following components:    Lipase >1000 (*)      All other components within normal limits   URINALYSIS, REFLEX TO URINE CULTURE - Abnormal; Notable for the following components:    Appearance, UA Cloudy (*)     Protein, UA 2+ (*)     Glucose, UA 3+ (*)     Ketones, UA Trace (*)     Bilirubin (UA) 1+ (*)     Occult Blood UA 3+ (*)     Urobilinogen, UA 4.0-6.0 (*)     Leukocytes, UA Trace (*)     All other components within normal limits    Narrative:     Preferred Collection Type->Urine, Clean Catch   B-TYPE NATRIURETIC PEPTIDE - Abnormal; Notable for the following components:     (*)     All other components within normal limits   LACTIC ACID, PLASMA - Abnormal; Notable for the following components:    Lactate (Lactic Acid) 9.5 (*)     All other components within normal limits    Narrative:     Lactic Acid critical result(s) called and verbal readback obtained   from Halina Sky.  by KAM 03/14/2020 08:38   URINALYSIS MICROSCOPIC - Abnormal; Notable for the following components:    RBC, UA 6 (*)     Granular Casts, UA 6 (*)     All other components within normal limits    Narrative:     Preferred Collection Type->Urine, Clean Catch   ISTAT PROCEDURE - Abnormal; Notable for the following components:    POC PCO2 23.9 (*)     POC PO2 36 (*)     POC HCO3 14.2 (*)     POC SATURATED O2 69 (*)     POC TCO2 15 (*)     All other components within normal limits   POCT GLUCOSE - Abnormal; Notable for the following components:    POCT Glucose 375 (*)     All other components within normal limits   POCT GLUCOSE - Abnormal; Notable for the following components:    POCT Glucose 338 (*)     All other components within normal limits   ISTAT PROCEDURE - Abnormal; Notable for the following components:    POC PH 7.140 (*)     POC HCO3 12.6 (*)     POC SATURATED O2 94 (*)     POC TCO2 14 (*)     All other components within normal limits   TROPONIN I   BETA - HYDROXYBUTYRATE, SERUM   POCT GLUCOSE MONITORING CONTINUOUS     EKG Readings: (Independently Interpreted)   This  patient is in a sinus tachycardia with a heart rate of 130.  There are no significant ST segment or T-wave changes.  There is no definite evidence of acute myocardial infarction or malignant arrhythmia.  The axis is normal.     ECG Results          EKG 12-lead (In process)  Result time 03/15/20 09:54:24    In process by Interface, Lab In [facility] (03/15/20 09:54:24)                 Narrative:    Test Reason : R00.0,    Vent. Rate : 151 BPM     Atrial Rate : 120 BPM     P-R Int : 000 ms          QRS Dur : 072 ms      QT Int : 316 ms       P-R-T Axes : 000 071 046 degrees     QTc Int : 500 ms    Atrial fibrillation with rapid ventricular response  Low voltage QRS  Septal infarct ,age undetermined  Abnormal ECG  When compared with ECG of 14-MAR-2020 06:52,  Significant changes have occurred    Referred By: [email or web address]   SELF           Confirmed By:                   In process by Interface, Lab In [facility] (03/15/20 09:28:12)                 Narrative:    Test Reason : R00.0,    Vent. Rate : 151 BPM     Atrial Rate : 120 BPM     P-R Int : 000 ms          QRS Dur : 072 ms      QT Int : 316 ms       P-R-T Axes : 000 071 046 degrees     QTc Int : 500 ms    Atrial fibrillation with rapid ventricular response  Low voltage QRS  Septal infarct ,age undetermined  Abnormal ECG  When compared with ECG of 14-MAR-2020 06:52,  Significant changes have occurred    Referred By: [email or web address]   SELF           Confirmed By:                             Imaging Results          FL ERCP Biliary Only (Final result)  Result time 03/14/20 14:17:02    Final result by Lani Lainez MD (03/14/20 14:17:02)                 Impression:      Images not submitted for interpretation,    Please see procedure notes for complete details.      Electronically signed by: Lani Lainez MD  Date:    03/14/2020  Time:    14:17             Narrative:    EXAMINATION:  FL ERCP BILIARY ONLY    CLINICAL HISTORY:  Choledocholithiasis;    TECHNIQUE:  Fluoroscopy  provided for procedural guidance.    Total flouro time: 4.41    COMPARISON:  None    FINDINGS:  Images not submitted for interpretation,    Please see procedure notes for complete details.                               X-Ray Chest 1 View (Final result)  Result time 03/14/20 11:12:50    Final result by Zuleyma Yoo MD (03/14/20 11:12:50)                 Impression:      No detrimental change.      Electronically signed by: Zuleyma Yoo MD  Date:    03/14/2020  Time:    11:12             Narrative:    EXAMINATION:  XR CHEST 1 VIEW    CLINICAL HISTORY:  . Encounter for adjustment and management of vascular access device    TECHNIQUE:  Single frontal portable view of the chest was performed.    COMPARISON:  March 14, 2020 at 06:55    FINDINGS:  Support devices: ET tube has its tip at T3.    There has not been any detrimental change since March 14, 2020 at 06:55.                               CT Abdomen Pelvis  Without Contrast (Final result)  Result time 03/14/20 08:02:03    Final result by Lani Lainez MD (03/14/20 08:02:03)                 Impression:      Imaging findings compatible with choledocholithiasis with acute pancreatitis.  There is either 1 large stone versus multiple stones seen within the common bile duct.  There is associated intrahepatic biliary ductal dilatation.  Evaluation for pancreatic necrosis is limited given the lack of intravenous contrast material.    Small bilateral pleural effusions with basilar patchy opacities which could be related to atelectasis, aspiration or pneumonia.      Electronically signed by: Lani Lainez MD  Date:    03/14/2020  Time:    08:02             Narrative:    EXAMINATION:  CT ABDOMEN PELVIS WITHOUT CONTRAST    CLINICAL HISTORY:  Abdominal pain, unspecified;    TECHNIQUE:  Low dose axial images, sagittal and coronal reformations were obtained from the lung bases to the pubic symphysis.  Oral contrast was not  administered.    COMPARISON:  01/08/2013    FINDINGS:  There are small bilateral pleural effusions.  Patchy basilar opacities, right greater than left which could be related to atelectasis, aspiration or pneumonia.  The base of the heart shows coronary artery disease.  Calcified atheromatous disease affects the aorta and its branch vessels.    The gallbladder is hydropic.  There is hyperdense debris seen within the common bile duct concerning for stones.  There is mild intrahepatic biliary ductal dilatation.  Fatty infiltration of the enlarged liver.  The pancreas is severely edematous and there is significant peripancreatic inflammatory stranding with surrounding fluid along the pericolic gutter compatible with acute pancreatitis.  Evaluation for necrosis limited given the lack of intravenous contrast material.  The spleen, adrenal glands and kidneys are normal in size, shape and contour.  Nonobstructing nephrolithiasis is seen on the right.  No obstructive uropathy is seen.  The urinary bladder is not well distended and could not be adequately evaluated.  There is some thickening of the walls of the bladder which could relate to under distension versus an infectious process.  Correlation with urinalysis is recommended.    Constipation.  Appendix is normal.  No free air or obstruction.  No pathologically enlarged abdominal or pelvic lymph nodes are seen.    Age-appropriate degenerative changes affect the skeleton.                               X-Ray Chest AP Portable (Final result)  Result time 03/14/20 07:16:21    Final result by Wisam Good MD (03/14/20 07:16:21)                 Impression:      Diminished depth of inspiration with crowding and atelectatic change more notable at the lung bases as well as suspected minimal pleural fluid at the costophrenic angles bilaterally.      Electronically signed by: Wisam Good  Date:    03/14/2020  Time:    07:16             Narrative:    EXAMINATION:  XR CHEST  AP PORTABLE    CLINICAL HISTORY:  chest pain;    TECHNIQUE:  Single frontal view of the chest was performed.    COMPARISON:  January 5, 2018    FINDINGS:  Single portable chest view is submitted.  There is diminished depth of inspiration mild rotation, the cardiomediastinal silhouette appears stable.  Radiographic appearance of crowding and areas of bandlike opacity likely representing atelectatic change noted, particularly at the lung bases.  There may be a small amount of pleural fluid as there is obscuration of the costophrenic angles, there is no large pleural effusion and no pneumothorax.  The osseous structures demonstrate chronic change.                                 Medical Decision Making:   History:   Old Medical Records: I decided to obtain old medical records.  Initial Assessment:   This is a 69 y.o. Male, with a PMHx of Asthma, High Cholesterol, Hypertension, Diabetes Mellitus, CKD, and TIA, who presents to the Emergency Department with a cc of high bilateral upper abdominal pain x2 days.  Clinical Tests:   Lab Tests: Ordered and Reviewed  Radiological Study: Ordered and Reviewed  Medical Tests: Ordered and Reviewed            Scribe Attestation:   Scribe #1: I performed the above scribed service and the documentation accurately describes the services I performed. I attest to the accuracy of the note.      Medical decision making:  This patient presents to the emergency with high abdominal pain.  He has choledocholithiasis pancreatitis biliary ductal dilatation and possible ascending cholangitis.  The patient was short of breath.  The reasons for this are not entirely clear.  He had a lactate of 9.  His critically ill P was treated with antibiotics.  He was intubated by Anesthesia in preparation for a ERCP.  I placed a right internal jugular catheter.  It appeared to track into the subclavian vein.  I believe it is usable.  The patient's blood pressure was low.  He received a substantial fluid  resuscitation.  I will continue fluids an add Levophed as necessary.                        Clinical Impression:       ICD-10-CM ICD-9-CM   1. Choledocholithiasis K80.50 574.50   2. Sinus tachycardia R00.0 427.89   3. Lactic acidosis E87.2 276.2   4. Acute biliary pancreatitis, unspecified complication status K85.10 577.0   5. Cholestasis K83.1 576.8   6. Encounter for central line placement Z45.2 V58.81   7. Atrial fibrillation with rapid ventricular response I48.91 427.31   8. Sepsis with acute organ dysfunction and septic shock A41.9 038.9    R65.21 995.92     785.52   9. Acute biliary pancreatitis K85.10 577.0   10. Acute renal failure superimposed on stage 3 chronic kidney disease N17.9 584.9    N18.3 585.3   11. Ascending cholangitis K83.09 576.1   12. Essential hypertension I10 401.9   13. Obesity (BMI 30.0-34.9) E66.9 278.00   14. On mechanically assisted ventilation Z99.11 V46.11   15. Type 2 diabetes mellitus with diabetic neuropathy, without long-term current use of insulin E11.40 250.60     357.2             ED Disposition Condition    Admit            I personally performed the services described in this documentation.  All medical record  entries made by the scribe are at my direction and in my presence.  Signed, Dr. Tra Dutta MD  03/14/20 0946       Duncan Dutta MD  03/15/20 1800

## 2020-03-14 NOTE — HOSPITAL COURSE
Patient 69-year-old man with hypertension, diabetes mellitus type 2, and chronic kidney disease stage 3 who presents with sepsis with lactic acidosis due to choledocholithiasis with ascending cholangitis with multiple stones seen in the common bile duct with intrahepatic biliary ductal dilatation.  Patient started on broad-spectrum antibiotics and underwent endoscopic retrograde cholangiopancreatography for source control on 3/14/2020.  Patient intubated prior to procedure and was left intubated due to evidence of worsening shock.  Patient started on vasopressor support.  Blood culture positive for gram negative franco.  Patient required increasing doses of vasopressor support and developed worsening acidosis.  Hemodialysis line placed on the left neck by general surgery on 3/14/2020 and continuous renal replacement therapy started.  Patient also developed atrial fibrillation with rapid ventricular response for which he was started on intravenous amiodarone.  Patient converted to normal sinus. Patient did not tolerate renal replacement therapy due to worsening hypotension and therefore dialysis was held on 3/15/2020.  Decision at that time was made to not subject patient to chest compressions in the event of cardiac arrest but to otherwise continue aggressive measures.  Patient's blood pressure improved and he was restarted on renal replacement therapy on the evening of 3/15/2020 however he continued to need higher doses of vasopressor support and therefore dialysis was discontinued again.  However had progressively worsening shock on three vasopressors and subsequently developed asystole.  Patient declared dead at 8:08 am on 3/16/2020.  Preliminary cause of death is septic shock secondary ascending cholangitis with gram negative bacteremia.  Family at the bedside at the time of death.

## 2020-03-14 NOTE — ANESTHESIA PROCEDURE NOTES
Intubation  Performed by: Dinora Fuentes MD  Authorized by: Dinora Fuentes MD     Intubation:     Induction:  Rapid sequence induction    Intubated:  Postinduction    Mask Ventilation:  Easy mask    Attempts:  2    Attempted By:  Staff anesthesiologist    Method of Intubation:  Video laryngoscopy and direct    Blade:  Jacinda 4    Laryngeal View Grade: Grade IIb - only the arytenoids and epiglottis seen      Attempted By (2nd Attempt):  Staff anesthesiologist    Method of Intubation (2nd Attempt):  Video laryngoscopy    Blade (2nd Attempt):  Glidescope 4    Laryngeal View Grade (2nd Attempt): Grade IIb - only the arytenoids and epiglottis seen      Difficult Airway Encountered?: No      Complications:  None    Airway Device:  Oral endotracheal tube    Airway Device Size:  7.5    Style/Cuff Inflation:  Cuffed    Inflation Amount (mL):  5    Tube secured:  24    Secured at:  The teeth    Placement Verified By:  Capnometry and Colorimetric ETCO2 device    Complicating Factors:  Poor neck/head extension, obesity, short neck and large/floppy epiglottis    Findings Post-Intubation:  BS equal bilateral and atraumatic/condition of teeth unchanged

## 2020-03-14 NOTE — NURSING
Pt rhythm changed to MD Jameson notified and came to bedside to assess patient. New orders given and followed-see chart, Family notified of change in status. Nephrology consulted.    Report given to BRYAN Moulton, MD spoke to Nephrology to place Dialysis Catheter.

## 2020-03-14 NOTE — NURSING
Pt arrived from Endo post ERCP, No report given from ER nurse and bedside report given per Endo RN. Pt intubated and put on vent in room per RT, CRNA gave report, pt on propofol at 10mcg/kg , LR and Levophed. Levoped rate at 117 which is 0.2 mcg/kg/min., Therefore continued titration accordingly.  Aguirre to gravity, no urine noted, restraints in place to barry wrists.  MD to bedside and orders placed.  Assessment done and continued to monitor pt closely.

## 2020-03-14 NOTE — RESPIRATORY THERAPY
Patient transported to [facility] on pNewton vent.  Placed on anesthesia  vent.  Patient transported to ICU room  277 and placed on servo I.  Tolerated well

## 2020-03-14 NOTE — ED NOTES
Pt placed on supplemental O2 d/t respiratory rate. Pt reports he feels extremely SOB at this time.

## 2020-03-14 NOTE — H&P
[facility] Medicine  History & Physical    Patient Name: Bonifacio Peoples Jr.  MRN: [id number]  Admission Date: 3/14/2020  Attending Physician: John Rider MD   Primary Care Provider: Justine Win MD         Patient information was obtained from patient, past medical records and ER records.     Subjective:     Principal Problem:Sepsis with acute organ dysfunction and septic shock    Chief Complaint:   Chief Complaint   Patient presents with    Abdominal Pain     Patient reports abdominal pain N/V X 1 day        HPI: Patient 69-year-old man with hypertension, diabetes mellitus type 2, and chronic kidney disease stage 3 who presents emergency department with severe abdominal pain associated nausea and vomiting.  Patient reports 3 episodes of emesis.  Patient reports some shortness of breath this morning.  Patient states that he has not been able to keep anything down including his prescription medications for the last few days.  Patient reports social drinking but denies any alcohol abuse.  Denies tobacco use.    Past Medical History:   Diagnosis Date    Anticoagulant long-term use     Arthritis     Rt and Lt wrist    Asthma, mild intermittent, well-controlled     Cataract     CKD (chronic kidney disease), stage III 1/20/2020    Depression     Diabetes mellitus     High cholesterol     Hypertension     Renal manifestation of secondary diabetes mellitus     Respiratory distress     Had to give a breathing treatment during last cysto procedure  2/10/12    Stroke     TIA  x3    TIA (transient ischemic attack)     Type 2 diabetes mellitus     Type 2 diabetes mellitus with diabetic neuropathy, without long-term current use of insulin        Past Surgical History:   Procedure Laterality Date    CARDIAC SURGERY  2010, 2008    repair openings to heart    CYSTOSCOPY W/ LASER LITHOTRIPSY      2/2012 unsuccessful with stone removal. Ureteral stent placed    ganglion cyst Right   "   KNEE ARTHROSCOPY W/ LASER Right     x 2    right wrist repair      TONSILLECTOMY      as a child       Review of patient's allergies indicates:   Allergen Reactions    Codeine      Childhood allergy, patient can't remember what happened.    Pcn [penicillins] Nausea And Vomiting    Zyrtec [cetirizine]      disorientated      Contrast media Rash     Pts wife states he is allergic to IV contrast "dye"    Iodine and iodide containing products Rash     IV dye contrast       No current facility-administered medications on file prior to encounter.      Current Outpatient Medications on File Prior to Encounter   Medication Sig    ACCU-CHEK JASON PLUS METER [facility] USE AS INSTRUCTED    aspirin (ECOTRIN) 81 MG EC tablet Take 81 mg by mouth after dinner. .    atorvastatin (LIPITOR) 40 MG tablet Take 1 tablet (40 mg total) by mouth once daily.    azelastine (ASTELIN) 137 mcg (0.1 %) nasal spray 1 spray (137 mcg total) by Nasal route 2 (two) times daily.    bisoprolol-hydrochlorothiazide 2.5-6.25 mg (ZIAC) 2.5-6.25 mg Tab Take 1 tablet by mouth every morning.    blood sugar diagnostic Strp 100 strips by Misc.(Non-Drug; Combo Route) route once daily.    citalopram (CELEXA) 40 MG tablet TAKE 1 TABLET EVERY MORNING    gabapentin (NEURONTIN) 300 MG capsule Take 1 capsule (300 mg total) by mouth 3 (three) times daily.    lancets (ACCU-CHEK SOFTCLIX LANCETS) Misc 1 Box by Misc.(Non-Drug; Combo Route) route once daily.    lisinopril (PRINIVIL,ZESTRIL) 2.5 MG tablet Take 1 tablet (2.5 mg total) by mouth once daily.    meclizine (ANTIVERT) 25 mg tablet Take 1 tablet (25 mg total) by mouth 3 (three) times daily as needed.    pioglitazone (ACTOS) 30 MG tablet Take 1 tablet (30 mg total) by mouth once daily. (Patient not taking: Reported on 1/20/2020)    ranitidine (ZANTAC) 300 MG tablet TAKE ONE TABLET BY MOUTH EVERY EVENING    SYMBICORT 160-4.5 mcg/actuation HFAA Inhale 2 puffs into the lungs every 12 (twelve) hours. "    VENTOLIN HFA 90 mcg/actuation inhaler INHALE TWO PUFFS BY MOUTH EVERY SIX HOURS AS NEEDED FOR SHORTNESS OF BREATH OR WHEEZING    [DISCONTINUED] metFORMIN (GLUCOPHAGE-XR) 500 MG 24 hr tablet Take 1 tablet (500 mg total) by mouth 2 (two) times daily with meals.     Family History     Problem Relation (Age of Onset)    Diabetes Mother, Sister    Heart attack Sister    Heart disease Sister, Paternal Grandfather    No Known Problems Father        Tobacco Use    Smoking status: Never Smoker    Smokeless tobacco: Never Used   Substance and Sexual Activity    Alcohol use: Yes     Comment: occassionally every 2-3 months     Drug use: No    Sexual activity: Yes     Partners: Female     Review of Systems   Constitutional: Negative for chills, diaphoresis, fatigue and fever.   HENT: Negative for congestion, ear pain, hearing loss and tinnitus.    Eyes: Negative for photophobia, pain, discharge and visual disturbance.   Respiratory: Positive for shortness of breath. Negative for chest tightness and wheezing.    Cardiovascular: Negative for chest pain, palpitations and leg swelling.   Gastrointestinal: Positive for abdominal pain, diarrhea and nausea. Negative for abdominal distention, blood in stool, constipation and vomiting.   Endocrine: Negative for cold intolerance, heat intolerance, polydipsia, polyphagia and polyuria.   Genitourinary: Negative for dysuria, flank pain, frequency, hematuria and urgency.   Musculoskeletal: Negative for arthralgias, back pain, gait problem, myalgias and neck pain.   Skin: Negative for color change, pallor, rash and wound.   Allergic/Immunologic: Negative for environmental allergies, food allergies and immunocompromised state.   Neurological: Negative for seizures, syncope, facial asymmetry, weakness and headaches.   Psychiatric/Behavioral: Negative for agitation, behavioral problems, confusion and hallucinations.     Objective:     Vital Signs (Most Recent):  Temp: 99.1 °F (37.3 °C)  (03/14/20 1131)  Pulse: 94 (03/14/20 1131)  Resp: (!) 34 (03/14/20 1113)  BP: 98/60 (03/14/20 1131)  SpO2: 100 % (03/14/20 1131) Vital Signs (24h Range):  Temp:  [98.6 °F (37 °C)-99.1 °F (37.3 °C)] 99.1 °F (37.3 °C)  Pulse:  [] 94  Resp:  [20-51] 34  SpO2:  [93 %-100 %] 100 %  BP: ()/(47-64) 98/60     Weight: 104.3 kg (230 lb)  Body mass index is 32.08 kg/m².    Physical Exam   Constitutional: He is oriented to person, place, and time. He appears well-developed and well-nourished. He appears distressed.   Obese   HENT:   Head: Normocephalic and atraumatic.   Nose: Nose normal.   Mouth/Throat: Oropharynx is clear and moist. No oropharyngeal exudate.   Eyes: Pupils are equal, round, and reactive to light. Conjunctivae and EOM are normal. Right eye exhibits no discharge. Left eye exhibits no discharge. No scleral icterus.   Neck: Normal range of motion. Neck supple. No JVD present. No tracheal deviation present. No thyromegaly present.   Cardiovascular: Regular rhythm, normal heart sounds and intact distal pulses. Exam reveals no gallop and no friction rub.   No murmur heard.  Tachycardic   Pulmonary/Chest: No stridor. He is in respiratory distress. He has no wheezes. He has no rales. He exhibits no tenderness.   Increased work of breathing.   Abdominal: Bowel sounds are normal. He exhibits no distension and no mass. There is tenderness. There is no rebound and no guarding.   Musculoskeletal: Normal range of motion. He exhibits no edema or tenderness.   Lymphadenopathy:     He has no cervical adenopathy.   Neurological: He is alert and oriented to person, place, and time. He has normal reflexes. He displays normal reflexes. No cranial nerve deficit. He exhibits normal muscle tone. Coordination normal.   Skin: Skin is warm and dry. Capillary refill takes less than 2 seconds. No rash noted. He is not diaphoretic. No erythema. No pallor.   Skin mottling involving his lower extremities and torso   Psychiatric:  He has a normal mood and affect. His behavior is normal. Judgment and thought content normal.         CRANIAL NERVES     CN III, IV, VI   Pupils are equal, round, and reactive to light.  Extraocular motions are normal.        Significant Labs: All pertinent labs within the past 24 hours have been reviewed.    Significant Imaging: I have reviewed all pertinent imaging results/findings within the past 24 hours.    Assessment/Plan:     * Sepsis with acute organ dysfunction and septic shock  Patient with evidence of severe sepsis with lactic acidosis (9.5 mmol/L), acute kidney injury, and with skin mottling due to choledocholithiasis with ascending cholangitis with multiple stones seen in the common bile duct with intrahepatic biliary ductal dilatation.  Will continue with intravenous fluid resuscitation and broad-spectrum antibiotics.  Discussed with anesthesia and gastroenterology services.  Plan for intubation for endoscopic retrograde cholangiopancreatography for source control.  Will leave on ventilator following procedure and admitted to the intensive care unit.  Will consult pulmonary/crticial care for evaluation, recommendations, and ventilation management.    Ascending cholangitis  Antibiotic therapy and endoscopic retrograde cholangiopancreatography for source control.    Choledocholithiasis  Plan on for endoscopic retrograde cholangiopancreatography for source control.     Acute biliary pancreatitis  Continue with intravenous fluids and plan on endoscopic retrograde cholangiopancreatography.    Acute renal failure superimposed on stage 3 chronic kidney disease  Secondary to severe sepsis and shock.  Continue with volume resuscitation.    Essential hypertension  Hold all blood pressure medication.    Obesity (BMI 30.0-34.9)  Body mass index is 32.08 kg/m².    VTE Risk Mitigation (From admission, onward)         Ordered     heparin (porcine) injection 5,000 Units  Every 8 hours      03/14/20 4579                  John Rider MD  Department of Hospital Medicine   [facility]

## 2020-03-14 NOTE — OR NURSING
Procedure completed. Pt transported to ICU with anesthesia monitoring vital signs and respiratory monitoring ventilator.  Pt In no acute distress while transporting.  Dr. Javed spoke with wife regarding procedure when completed. Report given to primary nurse at bedside.

## 2020-03-14 NOTE — ASSESSMENT & PLAN NOTE
Patient with evidence of severe sepsis with lactic acidosis (9.5 mmol/L), acute kidney injury, and with skin mottling due to choledocholithiasis with ascending cholangitis with multiple stones seen in the common bile duct with intrahepatic biliary ductal dilatation.  Will continue with intravenous fluid resuscitation and broad-spectrum antibiotics.  Discussed with anesthesia and gastroenterology services.  Plan for intubation for endoscopic retrograde cholangiopancreatography for source control.  Will leave on ventilator following procedure and admitted to the intensive care unit.  Will consult pulmonary/crticial care for evaluation, recommendations, and ventilation management.

## 2020-03-14 NOTE — RESPIRATORY THERAPY
Patient intubated with 7.5 ETT secures at 24cm at lip.  Positive color change on co2 detector.  Bilateral breath sound.  Patient placed on servo I PRVC 18/500/+5/100%

## 2020-03-14 NOTE — TRANSFER OF CARE
"Anesthesia Transfer of Care Note    Patient: Bonifacio Peoples Jr.    Procedure(s) Performed: Procedure(s) (LRB):  ERCP (ENDOSCOPIC RETROGRADE CHOLANGIOPANCREATOGRAPHY) (N/A)    Patient location: ICU    Anesthesia Type: general    Transport from OR: Transported from OR intubated on 100% O2 by AMBU with adequate controlled ventilation. Upon arrival to PACU/ICU, patient attached to ventilator and auscultated to confirm bilateral breath sounds and adequate TV. Continuous ECG monitoring in transport. Continuous SpO2 monitoring in transport. Continuos invasive BP monitoring in transport    Post pain: adequate analgesia    Post assessment: no apparent anesthetic complications and tolerated procedure well    Post vital signs: stable    Level of consciousness: sedated    Nausea/Vomiting: no nausea/vomiting    Complications: none    Transfer of care protocol was followed      Last vitals:   Visit Vitals  /65 (BP Location: Left arm, Patient Position: Lying)   Pulse (!) 115   Temp 37.3 °C (99.1 °F) (Oral)   Resp 18   Ht 5' 11" (1.803 m)   Wt 104.3 kg (230 lb)   SpO2 97%   BMI 32.08 kg/m²     "

## 2020-03-14 NOTE — [employer] PATIENT INSTRUCTIONS
Discharge Summary/Instructions after an Endoscopic Procedure  Patient Name: Bonifacio Peoples  Patient MRN: [id number]  Patient YOB: 1950 Saturday, March 14, 2020  Edy Javed MD  RESTRICTIONS:  During your procedure today, you received medications for sedation.  These   medications may affect your judgment, balance and coordination.  Therefore,   for 24 hours, you have the following restrictions:   - DO NOT drive a car, operate machinery, make legal/financial decisions,   sign important papers or drink alcohol.    ACTIVITY:  Today: no heavy lifting, straining or running due to procedural   sedation/anesthesia.  The following day: return to full activity including work.  DIET:  Eat and drink normally unless instructed otherwise.     TREATMENT FOR COMMON SIDE EFFECTS:  - Mild abdominal pain, nausea, belching, bloating or excessive gas:  rest,   eat lightly and use a heating pad.  - Sore Throat: treat with throat lozenges and/or gargle with warm salt   water.  - Because air was used during the procedure, expelling large amounts of air   from your rectum or belching is normal.  - If a bowel prep was taken, you may not have a bowel movement for 1-3 days.    This is normal.  SYMPTOMS TO WATCH FOR AND REPORT TO YOUR PHYSICIAN:  1. Abdominal pain or bloating, other than gas cramps.  2. Chest pain.  3. Back pain.  4. Signs of infection such as: chills or fever occurring within 24 hours   after the procedure.  5. Rectal bleeding, which would show as bright red, maroon, or black stools.   (A tablespoon of blood from the rectum is not serious, especially if   hemorrhoids are present.)  6. Vomiting.  7. Weakness or dizziness.  GO DIRECTLY TO THE NEAREST EMERGENCY ROOM IF YOU HAVE ANY OF THE FOLLOWING:      Difficulty breathing              Chills and/or fever over 101 F   Persistent vomiting and/or vomiting blood   Severe abdominal pain   Severe chest pain   Black, tarry stools   Bleeding- more than one  tablespoon   Any other symptom or condition that you feel may need urgent attention  Your doctor recommends these additional instructions:  If any biopsies were taken, your doctors clinic will contact you in 1 to 2   weeks with any results.  - Return patient to ICU for ongoing care.   - Avoid aspirin and nonsteroidal anti-inflammatory medicines for 7 days.   - Watch for pancreatitis, bleeding, perforation, and cholangitis.   - Continue present medications, including IV Abx.   - NPO.   - Check liver enzymes (AST, ALT, alkaline phosphatase, bilirubin) in the   morning.   - The findings and recommendations were discussed with the patient's family.     - The findings and recommendations were discussed with the patient's primary   physician.   - Surgical evaluation for cholecystectomy, when more medically stable.  For questions, problems or results please call your physician - Edy Javed MD at Work:  [phone].  [facility] can be reached at [phone]     IF A COMPLICATION OR EMERGENCY SITUATION ARISES AND YOU ARE UNABLE TO REACH   YOUR PHYSICIAN - GO DIRECTLY TO THE EMERGENCY ROOM.  Edy Javed MD  3/14/2020 1:58:57 PM  This report has been verified and signed electronically.  [employer]

## 2020-03-14 NOTE — ED TRIAGE NOTES
Pr arrives with c/o of abd pain, N/V, elevated BG and SOB x2 days. Pt tachypneic to the 50's and diaphoretic. Endorsing poor PO intake.

## 2020-03-14 NOTE — ANESTHESIA PREPROCEDURE EVALUATION
03/14/2020  Bonifacio Peoples Jr. is a 69 y.o., male.  Pre-operative evaluation for Procedure(s) (LRB):  ERCP (ENDOSCOPIC RETROGRADE CHOLANGIOPANCREATOGRAPHY) (N/A)    NPO >8h  METS 1-3; NEWBY     Admitted with septic cholangitis for ERCP today. Gave Calcium 1G IV prior to ENDO lab. Respiratory failure and intubated in ED prior to endo. History of ASD repair in 2014 with negative bubble post procedure. Stroke history with generalized weakness and deconditioning but no focal deficits. Hypotensive, tachypneic, hypoxia on NRB in ED when I went to evaluate him. Intubation for airway protection and in the setting of sepsis and shock. Wife advised of high risk of death or other morbidity from GETA for procedure. GI, Dr. Grady,  states it is emergent.     Vitals:    03/14/20 0743 03/14/20 0808 03/14/20 0832 03/14/20 0851   BP: 96/61 113/64 113/62    BP Location:       Patient Position:       Pulse: (!) 128 110 109 (!) 117   Resp: (!) 47 (!) 46 (!) 44 (!) 42   Temp:       TempSrc:       SpO2: (!) 93% 96% 99% 95%   Weight:       Height:           Patient Active Problem List   Diagnosis    History of kidney stones    Essential hypertension    Asthma    History of cerebrovascular accident (CVA) with residual deficit    Mood disorder    Restless leg syndrome, controlled    Type 2 diabetes mellitus with diabetic neuropathy, without long-term current use of insulin    Gastroesophageal reflux disease    Bilateral carotid artery stenosis    Non-seasonal allergic rhinitis due to pollen    Obesity (BMI 30.0-34.9)    Mixed hyperlipidemia    CKD (chronic kidney disease), stage III       Review of patient's allergies indicates:   Allergen Reactions    Codeine      Childhood allergy, patient can't remember what happened.    Pcn [penicillins] Nausea And Vomiting    Zyrtec [cetirizine]      disorientated      Contrast  "media Rash     Pts wife states he is allergic to IV contrast "dye"    Iodine and iodide containing products Rash     IV dye contrast       No current facility-administered medications on file prior to encounter.      Current Outpatient Medications on File Prior to Encounter   Medication Sig Dispense Refill    ACCU-CHEK JASON PLUS METER [facility] USE AS INSTRUCTED 1 each 0    aspirin (ECOTRIN) 81 MG EC tablet Take 81 mg by mouth after dinner. .      atorvastatin (LIPITOR) 40 MG tablet Take 1 tablet (40 mg total) by mouth once daily. 90 tablet 3    azelastine (ASTELIN) 137 mcg (0.1 %) nasal spray 1 spray (137 mcg total) by Nasal route 2 (two) times daily. 30 mL 0    bisoprolol-hydrochlorothiazide 2.5-6.25 mg (ZIAC) 2.5-6.25 mg Tab Take 1 tablet by mouth every morning. 90 tablet 1    blood sugar diagnostic Strp 100 strips by Misc.(Non-Drug; Combo Route) route once daily. 100 strip 3    citalopram (CELEXA) 40 MG tablet TAKE 1 TABLET EVERY MORNING 90 tablet 1    gabapentin (NEURONTIN) 300 MG capsule Take 1 capsule (300 mg total) by mouth 3 (three) times daily. 270 capsule 1    lancets (ACCU-CHEK SOFTCLIX LANCETS) Misc 1 Box by Misc.(Non-Drug; Combo Route) route once daily. 100 each 3    lisinopril (PRINIVIL,ZESTRIL) 2.5 MG tablet Take 1 tablet (2.5 mg total) by mouth once daily. 90 tablet 1    meclizine (ANTIVERT) 25 mg tablet Take 1 tablet (25 mg total) by mouth 3 (three) times daily as needed. 30 tablet 0    metFORMIN (GLUCOPHAGE-XR) 500 MG 24 hr tablet Take 1 tablet (500 mg total) by mouth 2 (two) times daily with meals. 180 tablet 1    pioglitazone (ACTOS) 30 MG tablet Take 1 tablet (30 mg total) by mouth once daily. (Patient not taking: Reported on 1/20/2020) 90 tablet 0    ranitidine (ZANTAC) 300 MG tablet TAKE ONE TABLET BY MOUTH EVERY EVENING 90 tablet 3    SYMBICORT 160-4.5 mcg/actuation HFAA Inhale 2 puffs into the lungs every 12 (twelve) hours. 10.2 g 5    VENTOLIN HFA 90 mcg/actuation inhaler " INHALE TWO PUFFS BY MOUTH EVERY SIX HOURS AS NEEDED FOR SHORTNESS OF BREATH OR WHEEZING 18 g 2       Past Surgical History:   Procedure Laterality Date    CARDIAC SURGERY  2010, 2008    repair openings to heart    CYSTOSCOPY W/ LASER LITHOTRIPSY      2/2012 unsuccessful with stone removal. Ureteral stent placed    ganglion cyst Right     KNEE ARTHROSCOPY W/ LASER Right     x 2    right wrist repair      TONSILLECTOMY      as a child       Social History     Socioeconomic History    Marital status:      Spouse name: Not on file    Number of children: Not on file    Years of education: Not on file    Highest education level: Not on file   Occupational History    Not on file   Social Needs    Financial resource strain: Not on file    Food insecurity:     Worry: Not on file     Inability: Not on file    Transportation needs:     Medical: Not on file     Non-medical: Not on file   Tobacco Use    Smoking status: Never Smoker    Smokeless tobacco: Never Used   Substance and Sexual Activity    Alcohol use: Yes     Comment: occassionally every 2-3 months     Drug use: No    Sexual activity: Yes     Partners: Female   Lifestyle    Physical activity:     Days per week: Not on file     Minutes per session: Not on file    Stress: Not on file   Relationships    Social connections:     Talks on phone: Not on file     Gets together: Not on file     Attends Buddhism service: Not on file     Active member of club or organization: Not on file     Attends meetings of clubs or organizations: Not on file     Relationship status: Not on file   Other Topics Concern    Not on file   Social History Narrative    Son and 3 stepchildren lives close by         CBC:   Recent Labs     03/14/20  0635   WBC 26.89*   RBC 6.20   HGB 18.7*   HCT 56.6*      MCV 91   MCH 30.2   MCHC 33.0       CMP:   Recent Labs     03/14/20  0635   *   K 4.9   CL 99   CO2 14*   BUN 31*   CREATININE 2.2*   *   CALCIUM  6.9*   ALBUMIN 3.3*   PROT 6.1   ALKPHOS 218*   *   *   BILITOT 9.7*       INR  No results for input(s): PT, INR, PROTIME, APTT in the last 72 hours.        Diagnostic Studies:      EKD Echo:  2014 bubble  CONCLUSIONS     1 - Normal left ventricular systolic function (EF 55-60%).     2 - No evidence of intracardiac shunt.       Anesthesia Evaluation    I have reviewed the Patient Summary Reports.     I have reviewed the Medications.     Review of Systems  Anesthesia Hx:  No problems with previous Anesthesia  History of prior surgery of interest to airway management or planning: Denies Family Hx of Anesthesia complications.   Denies Personal Hx of Anesthesia complications.   Social:  Non-Smoker, Social Alcohol Use    Hematology/Oncology:  Hematology Normal   Oncology Normal     EENT/Dental:EENT/Dental Normal   Cardiovascular:   Exercise tolerance: good Hypertension ECG has been reviewed.    Pulmonary:   Asthma    Renal/:   Chronic Renal Disease, CRI, ARF    Hepatic/GI:   GERD    Neurological:   TIA, CVA    Endocrine:   Diabetes, type 2    Psych:   Psychiatric History          Physical Exam  General:  Obesity    Airway/Jaw/Neck:  Airway Findings:    Eyes/Ears/Nose:  EYES/EARS/NOSE FINDINGS: Normal         Mental Status:  Mental Status Findings: Normal        Anesthesia Plan  Type of Anesthesia, risks & benefits discussed:  Anesthesia Type:  general  Patient's Preference:   Intra-op Monitoring Plan: standard ASA monitors and arterial line  Intra-op Monitoring Plan Comments:   Post Op Pain Control Plan: multimodal analgesia, IV/PO Opioids PRN and per primary service following discharge from PACU  Post Op Pain Control Plan Comments:   Induction:    Beta Blocker:  Patient is on a Beta-Blocker and has received one dose within the past 24 hours (No further documentation required).       Informed Consent: Patient understands risks and agrees with Anesthesia plan.  Questions answered. Anesthesia  consent signed with patient.  ASA Score: 4  emergent   Day of Surgery Review of History & Physical:   Significant changes noted: Surgeon notified.      Anesthesia Plan Notes:  Respiratory failure and intubated in ED prior to endo. History of ASD repair in 2014 with negative bubble post procedure. Stroke history with generalized weakness and deconditioning but no focal deficits. Hypotensive, tachypneic, hypoxia on NRB in ED when I went to evaluate him. Intubation for airway protection and in the setting of sepsis and shock. CVL placed in ED and patient started on Levophed 0.2mcg/kg/min for MAP > 65. Wife advised of high risk of death or other morbidity from GETA for procedure. GI, Dr. Grady,  states it is emergent.             Ready For Surgery From Anesthesia Perspective.

## 2020-03-14 NOTE — HPI
Patient 69-year-old man with hypertension, diabetes mellitus type 2, and chronic kidney disease stage 3 who presents emergency department with severe abdominal pain associated nausea and vomiting.  Patient reports 3 episodes of emesis.  Patient reports some shortness of breath this morning.  Patient states that he has not been able to keep anything down including his prescription medications for the last few days.  Patient reports social drinking but denies any alcohol abuse.  Denies tobacco use.

## 2020-03-14 NOTE — SUBJECTIVE & OBJECTIVE
"Past Medical History:   Diagnosis Date    Anticoagulant long-term use     Arthritis     Rt and Lt wrist    Asthma, mild intermittent, well-controlled     Cataract     CKD (chronic kidney disease), stage III 1/20/2020    Depression     Diabetes mellitus     High cholesterol     Hypertension     Renal manifestation of secondary diabetes mellitus     Respiratory distress     Had to give a breathing treatment during last cysto procedure  2/10/12    Stroke     TIA  x3    TIA (transient ischemic attack)     Type 2 diabetes mellitus     Type 2 diabetes mellitus with diabetic neuropathy, without long-term current use of insulin        Past Surgical History:   Procedure Laterality Date    CARDIAC SURGERY  2010, 2008    repair openings to heart    CYSTOSCOPY W/ LASER LITHOTRIPSY      2/2012 unsuccessful with stone removal. Ureteral stent placed    ganglion cyst Right     KNEE ARTHROSCOPY W/ LASER Right     x 2    right wrist repair      TONSILLECTOMY      as a child       Review of patient's allergies indicates:   Allergen Reactions    Codeine      Childhood allergy, patient can't remember what happened.    Pcn [penicillins] Nausea And Vomiting    Zyrtec [cetirizine]      disorientated      Contrast media Rash     Pts wife states he is allergic to IV contrast "dye"    Iodine and iodide containing products Rash     IV dye contrast       No current facility-administered medications on file prior to encounter.      Current Outpatient Medications on File Prior to Encounter   Medication Sig    ACCU-CHEK JASON PLUS METER Misc USE AS INSTRUCTED    aspirin (ECOTRIN) 81 MG EC tablet Take 81 mg by mouth after dinner. .    atorvastatin (LIPITOR) 40 MG tablet Take 1 tablet (40 mg total) by mouth once daily.    azelastine (ASTELIN) 137 mcg (0.1 %) nasal spray 1 spray (137 mcg total) by Nasal route 2 (two) times daily.    bisoprolol-hydrochlorothiazide 2.5-6.25 mg (ZIAC) 2.5-6.25 mg Tab Take 1 tablet by mouth " every morning.    blood sugar diagnostic Strp 100 strips by Misc.(Non-Drug; Combo Route) route once daily.    citalopram (CELEXA) 40 MG tablet TAKE 1 TABLET EVERY MORNING    gabapentin (NEURONTIN) 300 MG capsule Take 1 capsule (300 mg total) by mouth 3 (three) times daily.    lancets (ACCU-CHEK SOFTCLIX LANCETS) Misc 1 Box by Misc.(Non-Drug; Combo Route) route once daily.    lisinopril (PRINIVIL,ZESTRIL) 2.5 MG tablet Take 1 tablet (2.5 mg total) by mouth once daily.    meclizine (ANTIVERT) 25 mg tablet Take 1 tablet (25 mg total) by mouth 3 (three) times daily as needed.    pioglitazone (ACTOS) 30 MG tablet Take 1 tablet (30 mg total) by mouth once daily. (Patient not taking: Reported on 1/20/2020)    ranitidine (ZANTAC) 300 MG tablet TAKE ONE TABLET BY MOUTH EVERY EVENING    SYMBICORT 160-4.5 mcg/actuation HFAA Inhale 2 puffs into the lungs every 12 (twelve) hours.    VENTOLIN HFA 90 mcg/actuation inhaler INHALE TWO PUFFS BY MOUTH EVERY SIX HOURS AS NEEDED FOR SHORTNESS OF BREATH OR WHEEZING    [DISCONTINUED] metFORMIN (GLUCOPHAGE-XR) 500 MG 24 hr tablet Take 1 tablet (500 mg total) by mouth 2 (two) times daily with meals.     Family History     Problem Relation (Age of Onset)    Diabetes Mother, Sister    Heart attack Sister    Heart disease Sister, Paternal Grandfather    No Known Problems Father        Tobacco Use    Smoking status: Never Smoker    Smokeless tobacco: Never Used   Substance and Sexual Activity    Alcohol use: Yes     Comment: occassionally every 2-3 months     Drug use: No    Sexual activity: Yes     Partners: Female     Review of Systems   Constitutional: Negative for chills, diaphoresis, fatigue and fever.   HENT: Negative for congestion, ear pain, hearing loss and tinnitus.    Eyes: Negative for photophobia, pain, discharge and visual disturbance.   Respiratory: Positive for shortness of breath. Negative for chest tightness and wheezing.    Cardiovascular: Negative for chest  pain, palpitations and leg swelling.   Gastrointestinal: Positive for abdominal pain, diarrhea and nausea. Negative for abdominal distention, blood in stool, constipation and vomiting.   Endocrine: Negative for cold intolerance, heat intolerance, polydipsia, polyphagia and polyuria.   Genitourinary: Negative for dysuria, flank pain, frequency, hematuria and urgency.   Musculoskeletal: Negative for arthralgias, back pain, gait problem, myalgias and neck pain.   Skin: Negative for color change, pallor, rash and wound.   Allergic/Immunologic: Negative for environmental allergies, food allergies and immunocompromised state.   Neurological: Negative for seizures, syncope, facial asymmetry, weakness and headaches.   Psychiatric/Behavioral: Negative for agitation, behavioral problems, confusion and hallucinations.     Objective:     Vital Signs (Most Recent):  Temp: 99.1 °F (37.3 °C) (03/14/20 1131)  Pulse: 94 (03/14/20 1131)  Resp: (!) 34 (03/14/20 1113)  BP: 98/60 (03/14/20 1131)  SpO2: 100 % (03/14/20 1131) Vital Signs (24h Range):  Temp:  [98.6 °F (37 °C)-99.1 °F (37.3 °C)] 99.1 °F (37.3 °C)  Pulse:  [] 94  Resp:  [20-51] 34  SpO2:  [93 %-100 %] 100 %  BP: ()/(47-64) 98/60     Weight: 104.3 kg (230 lb)  Body mass index is 32.08 kg/m².    Physical Exam   Constitutional: He is oriented to person, place, and time. He appears well-developed and well-nourished. He appears distressed.   Obese   HENT:   Head: Normocephalic and atraumatic.   Nose: Nose normal.   Mouth/Throat: Oropharynx is clear and moist. No oropharyngeal exudate.   Eyes: Pupils are equal, round, and reactive to light. Conjunctivae and EOM are normal. Right eye exhibits no discharge. Left eye exhibits no discharge. No scleral icterus.   Neck: Normal range of motion. Neck supple. No JVD present. No tracheal deviation present. No thyromegaly present.   Cardiovascular: Regular rhythm, normal heart sounds and intact distal pulses. Exam reveals no  gallop and no friction rub.   No murmur heard.  Tachycardic   Pulmonary/Chest: No stridor. He is in respiratory distress. He has no wheezes. He has no rales. He exhibits no tenderness.   Increased work of breathing.   Abdominal: Bowel sounds are normal. He exhibits no distension and no mass. There is tenderness. There is no rebound and no guarding.   Musculoskeletal: Normal range of motion. He exhibits no edema or tenderness.   Lymphadenopathy:     He has no cervical adenopathy.   Neurological: He is alert and oriented to person, place, and time. He has normal reflexes. He displays normal reflexes. No cranial nerve deficit. He exhibits normal muscle tone. Coordination normal.   Skin: Skin is warm and dry. Capillary refill takes less than 2 seconds. No rash noted. He is not diaphoretic. No erythema. No pallor.   Skin mottling involving his lower extremities and torso   Psychiatric: He has a normal mood and affect. His behavior is normal. Judgment and thought content normal.         CRANIAL NERVES     CN III, IV, VI   Pupils are equal, round, and reactive to light.  Extraocular motions are normal.        Significant Labs: All pertinent labs within the past 24 hours have been reviewed.    Significant Imaging: I have reviewed all pertinent imaging results/findings within the past 24 hours.

## 2020-03-14 NOTE — RESPIRATORY THERAPY
Results reported to Dr Dutta.   Results for ASHLIE ABEL JR. (MRN [id number]) as of 3/14/2020 07:16   Ref. Range 3/14/2020 06:37   POC PH Latest Ref Range: 7.35 - 7.45  7.380   POC PCO2 Latest Ref Range: 35 - 45 mmHg 23.9 (L)   POC PO2 Latest Ref Range: 40 - 60 mmHg 36 (LL)   POC BE Latest Ref Range: -2 to 2 mmol/L -8   POC HCO3 Latest Ref Range: 24 - 28 mmol/L 14.2 (L)   POC SATURATED O2 Latest Ref Range: 95 - 100 % 69 (L)   POC TCO2 Latest Ref Range: 24 - 29 mmol/L 15 (L)   FiO2 Unknown 21   Sample Unknown VENOUS   DelSys Unknown Room Air   Allens Test Unknown N/A   Site Unknown Other   Mode Unknown SPONT

## 2020-03-14 NOTE — CONSULTS
Reason for Consult:  Abd. Pain, Vomiting, Abnormal LFT's, Abnormal GI imaging    HPI:  Pt is a 69 y.o. male who presents with abd. Pain x 2 days.  Pt. Reports his symptoms began acutely, and became severe, prompting ER evaluation.  Pain is across his mid abdomen, non-radiating, sharp, constant.  + associated N/V, non-bloody.  No F/C, wt. Loss, nor prior similar symptoms.  No GERD sx's, dysphagia.  + subjective yellowing of eyes/skin.  No diarrhea/constipation.  No blood in his stool nor black/tarry stools.      No prior endoscopic history.     Past Medical History:   Diagnosis Date    Anticoagulant long-term use     Arthritis     Rt and Lt wrist    Asthma, mild intermittent, well-controlled     Cataract     CKD (chronic kidney disease), stage III 1/20/2020    Depression     Diabetes mellitus     High cholesterol     Hypertension     Renal manifestation of secondary diabetes mellitus     Respiratory distress     Had to give a breathing treatment during last cysto procedure  2/10/12    Stroke     TIA  x3    TIA (transient ischemic attack)     Type 2 diabetes mellitus     Type 2 diabetes mellitus with diabetic neuropathy, without long-term current use of insulin        Past Surgical History:   Procedure Laterality Date    CARDIAC SURGERY  2010, 2008    repair openings to heart    CYSTOSCOPY W/ LASER LITHOTRIPSY      2/2012 unsuccessful with stone removal. Ureteral stent placed    ganglion cyst Right     KNEE ARTHROSCOPY W/ LASER Right     x 2    right wrist repair      TONSILLECTOMY      as a child       Family History   Problem Relation Age of Onset    Diabetes Mother     No Known Problems Father     Diabetes Sister     Heart attack Sister     Heart disease Sister     Heart disease Paternal Grandfather        Social History     Socioeconomic History    Marital status:      Spouse name: Not on file    Number of children: Not on file    Years of education: Not on file    Highest  "education level: Not on file   Occupational History    Not on file   Social Needs    Financial resource strain: Not on file    Food insecurity:     Worry: Not on file     Inability: Not on file    Transportation needs:     Medical: Not on file     Non-medical: Not on file   Tobacco Use    Smoking status: Never Smoker    Smokeless tobacco: Never Used   Substance and Sexual Activity    Alcohol use: Yes     Comment: occassionally every 2-3 months     Drug use: No    Sexual activity: Yes     Partners: Female   Lifestyle    Physical activity:     Days per week: Not on file     Minutes per session: Not on file    Stress: Not on file   Relationships    Social connections:     Talks on phone: Not on file     Gets together: Not on file     Attends L[employer] service: Not on file     Active member of club or organization: Not on file     Attends meetings of clubs or organizations: Not on file     Relationship status: Not on file   Other Topics Concern    Not on file   Social History Narrative    Son and 3 stepchildren lives close by       Endoscopic History:  None    Review of patient's allergies indicates:   Allergen Reactions    Codeine      Childhood allergy, patient can't remember what happened.    Pcn [penicillins] Nausea And Vomiting    Zyrtec [cetirizine]      disorientated      Contrast media Rash     Pts wife states he is allergic to IV contrast "dye"    Iodine and iodide containing products Rash     IV dye contrast       No current facility-administered medications on file prior to encounter.      Current Outpatient Medications on File Prior to Encounter   Medication Sig Dispense Refill    ACCU-CHEK JASON PLUS METER [facility] USE [place] INSTRUCTED 1 each 0    aspirin (ECOTRIN) 81 MG EC tablet Take 81 mg by mouth after dinner. .      atorvastatin (LIPITOR) 40 MG tablet Take 1 tablet (40 mg total) by mouth once daily. 90 tablet 3    azelastine (ASTELIN) 137 mcg (0.1 %) nasal spray 1 spray (137 mcg total) " by Nasal route 2 (two) times daily. 30 mL 0    bisoprolol-hydrochlorothiazide 2.5-6.25 mg (ZIAC) 2.5-6.25 mg Tab Take 1 tablet by mouth every morning. 90 tablet 1    blood sugar diagnostic Strp 100 strips by Misc.(Non-Drug; Combo Route) route once daily. 100 strip 3    citalopram (CELEXA) 40 MG tablet TAKE 1 TABLET EVERY MORNING 90 tablet 1    gabapentin (NEURONTIN) 300 MG capsule Take 1 capsule (300 mg total) by mouth 3 (three) times daily. 270 capsule 1    lancets (ACCU-CHEK SOFTCLIX LANCETS) Misc 1 Box by Misc.(Non-Drug; Combo Route) route once daily. 100 each 3    lisinopril (PRINIVIL,ZESTRIL) 2.5 MG tablet Take 1 tablet (2.5 mg total) by mouth once daily. 90 tablet 1    meclizine (ANTIVERT) 25 mg tablet Take 1 tablet (25 mg total) by mouth 3 (three) times daily as needed. 30 tablet 0    metFORMIN (GLUCOPHAGE-XR) 500 MG 24 hr tablet Take 1 tablet (500 mg total) by mouth 2 (two) times daily with meals. 180 tablet 1    pioglitazone (ACTOS) 30 MG tablet Take 1 tablet (30 mg total) by mouth once daily. (Patient not taking: Reported on 1/20/2020) 90 tablet 0    ranitidine (ZANTAC) 300 MG tablet TAKE ONE TABLET BY MOUTH EVERY EVENING 90 tablet 3    SYMBICORT 160-4.5 mcg/actuation HFAA Inhale 2 puffs into the lungs every 12 (twelve) hours. 10.2 g 5    VENTOLIN HFA 90 mcg/actuation inhaler INHALE TWO PUFFS BY MOUTH EVERY SIX HOURS AS NEEDED FOR SHORTNESS OF BREATH OR WHEEZING 18 g 2     Scheduled Meds:   ciprofloxacin  400 mg Intravenous ED 1 Time    meropenem (MERREM) IVPB  2 g Intravenous ED 1 Time    metronidazole  500 mg Intravenous ED 1 Time    sodium chloride 0.9%  1,000 mL Intravenous ED 1 Time     Continuous Infusions:  PRN Meds:.    Review of Systems:  CONSTITUTIONAL: + for weakness, no fever, weight loss, weight gain.  HEENT: Eyes: Negative for double/blurred vision. Ears: Negative pain or loss of hearing. Nose:Negative for nasal congestion. Negative for rhinorrhea Mouth: Negative for dry  "mouth/pain Throat: Negative for masses or hoarseness.  CARDIOVASCULAR: Negative for chest pain or palpitations.  RESPIRATORY: Negative for SOB, wheezes  GASTROINTESTINAL: See HPI  GENITOURINARY: Negative for dysuria/hematuria.  MUSCULOSKELETAL: Negative for osteoarthritis, muscle pain.  SKIN: Negative for rashes/lesions.  NEUROLOGIC: Negative for headaches, numbness/tingling, + Hx of CVA.  ENDOCRINE: + for diabetes, no thyroid abnormalities.  HEMATOLOGIC: Negative for anemia or blood dyscrasias.    Patient Vitals for the past 24 hrs:   BP Temp Temp src Pulse Resp SpO2 Height Weight   03/14/20 0851 -- -- -- (!) 117 (!) 42 95 % -- --   03/14/20 0832 113/62 -- -- 109 (!) 44 99 % -- --   03/14/20 0808 113/64 -- -- 110 (!) 46 96 % -- --   03/14/20 0743 96/61 -- -- (!) 128 (!) 47 (!) 93 % -- --   03/14/20 0712 112/60 -- -- (!) 122 (!) 50 100 % -- --   03/14/20 0648 (!) 98/55 -- -- (!) 126 (!) 51 (!) 94 % -- --   03/14/20 0637 -- -- -- (!) 128 (!) 45 (!) 94 % -- --   03/14/20 0621 (!) 104/58 -- -- (!) 124 (!) 41 (!) 94 % -- --   03/14/20 0611 98/64 98.6 °F (37 °C) Oral (!) 124 20 99 % 5' 11" (1.803 m) 104.3 kg (230 lb)       Gen: Well developed, well nourished, + resp. distress, cooperative  HEENT: + icteric, PERRLA, normocephalic, neck symmetrical  CV: S1, S2, no murmers/rubs, non-displaced PMI  Lungs: CTA-B, normal excursion  Abd: Soft, NT, ND, normal BS's, no HSM  Ext: No c/c/e, 1+ DP pulses to BLE's  Neuro: CN II-XII grossly intact, no asterixis.  Skin: No rashes/lesions, normal texture  Psych: AA&O x 4, normal affect    Labs:  Recent Labs   Lab 03/14/20  0635   CALCIUM 6.9*   PROT 6.1   *   K 4.9   CO2 14*   CL 99   BUN 31*   CREATININE 2.2*   ALKPHOS 218*   *   *   BILITOT 9.7*     Recent Results (from the past 336 hour(s))   CBC auto differential    Collection Time: 03/14/20  6:35 AM   Result Value Ref Range    WBC 26.89 (H) 3.90 - 12.70 K/uL    Hemoglobin 18.7 (H) 14.0 - 18.0 g/dL    Hematocrit " 56.6 (H) 40.0 - 54.0 %    Platelets 171 150 - 350 K/uL     Lipase > 1000    Imaging:  CT Abd:  Imaging findings compatible with choledocholithiasis with acute pancreatitis.  There is either 1 large stone versus multiple stones seen within the common bile duct.  There is associated intrahepatic biliary ductal dilatation.  Evaluation for pancreatic necrosis is limited given the lack of intravenous contrast material.    Small bilateral pleural effusions with basilar patchy opacities which could be related to atelectasis, aspiration or pneumonia.    Assessment:  Pt. Is a 69 y.o. male with:  1. Elevated WBC/Lactate - highly suggestive/concerning for ascending cholangitis.    2. Abnormal GI imaging, CBD stones with obstruction, biliary dilation  3. Biliary Pancreatitis, acute/severe  4. Abnormal LFT's - secondary to above.  5. Hx of DM, Asthma, HTN, CKD, TIA, Hypercholesterolemia....    Recommendations:  1. Supportive care with IVF's/LR, pressors.  2. IV Abx,   3. ERCP today with Anesthesia.  4. Check INR.    Case discussed with patient (prior to intubation), and anesthesia.  Realize that patient is extremely ill, but ERCP with relief of biliary obstruction will be the best/preferred treatment (along with resuscitation and Abx).        I would like to take this opportunity to thank you for this consult.  If you have any questions or concerns, please do not hesitate to contact me.

## 2020-03-14 NOTE — ANESTHESIA POSTPROCEDURE EVALUATION
Anesthesia Post Evaluation    Patient: Bonifacio Peoples Jr.    Procedure(s) Performed: Procedure(s) (LRB):  ERCP (ENDOSCOPIC RETROGRADE CHOLANGIOPANCREATOGRAPHY) (N/A)    Final Anesthesia Type: general    Patient location during evaluation: ICU  Patient participation: Yes- Able to Participate  Level of consciousness: sedated  Post-procedure vital signs: reviewed and stable  Pain management: adequate  Airway patency: patent    PONV status at discharge: No PONV  Anesthetic complications: no      Cardiovascular status: hypotensive  Respiratory status: ventilator  Hydration status: hypovolemic  Follow-up not needed.          Vitals Value Taken Time   /65 3/14/2020  2:08 PM   Temp 37.3 °C (99.1 °F) 3/14/2020  2:08 PM   Pulse 117 3/14/2020  3:00 PM   Resp 18 3/14/2020  3:00 PM   SpO2 93 % 3/14/2020  3:00 PM   Vitals shown include unvalidated device data.      No case tracking events are documented in the log.      Pain/Jena Score: Pain Rating Prior to Med Admin: 10 (3/14/2020 10:04 AM)

## 2020-03-15 PROBLEM — Z99.11 ON MECHANICALLY ASSISTED VENTILATION: Status: ACTIVE | Noted: 2020-01-01

## 2020-03-15 NOTE — HOSPITAL COURSE
Mr. Peoples remains critically ill and requires ongoing life support for septic shock, acute kidney injury, and acute respiratory failure.  He was seen/evaluated on ICU rounds on 3/15/2020.    Ventilator settings => On maximal vasopressors with norepinephrine, vasopressin, and phenylephrine.  Bicarbonate infusion ongoing, along with bolus dosing.  Stress steroids initiated; insulin infusion for glycemic control.    Intubation Date Vent Day PBW Mode Set Rate Pt Rate TV (ml/kg) FiO2 PEEP Ppeak Pplat   3/14 at 10:30 am 2 75.3 kg PRVC 18 18 500  (6.6) 100% 8 25      PEEP reduced from 10 to 8 given severe shock.  Off/on CRRT/SLED due to poor flows and hypotension.    ICU Best Practices => pantoprazole; heparin SQ    Minimal urine output since ICU admission.     Culture Results:  Gram negative rods in admission blood cultures.    Current antibiotics:  · Ciprofloxacin => 3/14 to present  · Meropenem => 3/14 to present  · Metronidazole => 3/14 to present    · RUQ ultrasound with likely cholecystitis changes but only intrahepatic dilatation.  Seems unlikely to benefit from percutaneous biliary drainage.  I doubt percutaneous cholecystostomy tube placement is warranted but may need to be considered for maximal source control.

## 2020-03-15 NOTE — ASSESSMENT & PLAN NOTE
Patient with worsenomg shock, renal failure, and acidosis despite efforts at source control (status post endoscopic retrograde cholangiopancreatography), broad-spectrum antibiotics, renal replacement therapy.  Continue to titrate vasopressors with goal of maintaining mean arterial pressure of 65 mmHg.  Will in addition to norepinephrine and vasopressin will also start phenylephrine.  Start stress dose steroids.  Ventilation management per critical care.

## 2020-03-15 NOTE — NURSING
Dr. Negron at bedside. Patient bp falling, 66/48 MAP 55. Awaiting Scott from pharm. Propofol turned off.

## 2020-03-15 NOTE — NURSING
Dr. Rider at bedside. Nohemy obtained by anesthesia, bp 70/50 map 50s. Md aware and states to increase levo and when maxed Md will add Epi. Md aware patient continues to be hypothermic although being on warming blanket maxed. Reported to Md Sats not picking up.

## 2020-03-15 NOTE — PROGRESS NOTES
[facility] Hospital Medicine  Progress Note    Patient Name: Bonifacio Peoples Jr.  MRN: [id number]  Patient Class: IP- Inpatient   Admission Date: 3/14/2020  Length of Stay: 1 days  Attending Physician: John Rider MD  Primary Care Provider: Justine Win MD        Subjective:     Principal Problem:Sepsis with acute organ dysfunction and septic shock        HPI:  Patient 69-year-old man with hypertension, diabetes mellitus type 2, and chronic kidney disease stage 3 who presents emergency department with severe abdominal pain associated nausea and vomiting.  Patient reports 3 episodes of emesis.  Patient reports some shortness of breath this morning.  Patient states that he has not been able to keep anything down including his prescription medications for the last few days.  Patient reports social drinking but denies any alcohol abuse.  Denies tobacco use.    Overview/Hospital Course:  Patient 69-year-old man with hypertension, diabetes mellitus type 2, and chronic kidney disease stage 3 who presents with sepsis with lactic acidosis due to choledocholithiasis with ascending cholangitis with multiple stones seen in the common bile duct with intrahepatic biliary ductal dilatation.  Patient started on broad-spectrum antibiotics and underwent endoscopic retrograde cholangiopancreatography for source control on 3/14/2020.  Patient intubated prior to procedure and was left intubated due to evidence of worsening shock.  Patient started on vasopressor support.  Blood culture positive for gram negative franco.  Patient required increasing doses of vasopressor support and developed worsening acidosis.  Hemodialysis line placed on the left neck by general surgery on 3/14/2020 and continuous renal replacement therapy started.  Patient also developed atrial fibrillation with rapid ventricular response for which he was started on intravenous amiodarone. Patient converted to normal sinus.    Interval History:   Patient needing higher doses of vasopressor support to mean reasonable blood pressure.  Patient continues to have severe acidosis despite renal replacement therapy and started on sodium bicarbonate infusion.    Review of Systems   Unable to perform ROS: Intubated     Objective:     Vital Signs (Most Recent):  Temp: (!) 92.7 °F (33.7 °C) (03/15/20 0821)  Pulse: 91 (03/15/20 0821)  Resp: (!) 23 (03/15/20 0821)  BP: 95/67 (03/15/20 0800)  SpO2: (!) 92 % (03/15/20 0821) Vital Signs (24h Range):  Temp:  [85.3 °F (29.6 °C)-99.1 °F (37.3 °C)] 92.7 °F (33.7 °C)  Pulse:  [] 91  Resp:  [18-34] 23  SpO2:  [73 %-100 %] 92 %  BP: ()/(38-85) 95/67     Weight: (104 kg)  Body mass index is 32.08 kg/m².    Intake/Output Summary (Last 24 hours) at 3/15/2020 1012  Last data filed at 3/15/2020 0600  Gross per 24 hour   Intake 6338.31 ml   Output 0 ml   Net 6338.31 ml      Physical Exam   Constitutional: He appears well-developed and well-nourished.   Obese   HENT:   Head: Normocephalic and atraumatic.   Nose: Nose normal.   Mouth/Throat: Oropharynx is clear and moist. No oropharyngeal exudate.   Eyes: Pupils are equal, round, and reactive to light. Conjunctivae and EOM are normal. Right eye exhibits no discharge. Left eye exhibits no discharge. No scleral icterus.   Neck: Normal range of motion. Neck supple. No JVD present. No tracheal deviation present. No thyromegaly present.   Cardiovascular: Regular rhythm, normal heart sounds and intact distal pulses. Exam reveals no gallop and no friction rub.   No murmur heard.  Tachycardic and regular this morning.  Central line triple lumen in right neck.  HD catheter in left neck.  Atrial line in left upper extremity.   Pulmonary/Chest: No stridor. He has no wheezes. He has no rales. He exhibits no tenderness.   Intubated with high oxygen requirement   Abdominal: He exhibits no distension and no mass. There is no rebound and no guarding.   Hypoactive bowel sounds.    Musculoskeletal: Normal range of motion. He exhibits no edema or tenderness.   Lymphadenopathy:     He has no cervical adenopathy.   Neurological: He has normal reflexes. He displays normal reflexes. No cranial nerve deficit. He exhibits normal muscle tone. Coordination normal.   Sedated   Skin: Skin is warm and dry. Capillary refill takes less than 2 seconds. No rash noted. He is not diaphoretic. No erythema. No pallor.   Skin mottling involving his lower extremities and torso   Psychiatric: He has a normal mood and affect. His behavior is normal. Judgment and thought content normal.       Significant Labs: All pertinent labs within the past 24 hours have been reviewed.    Significant Imaging: I have reviewed all pertinent imaging results/findings within the past 24 hours.      Assessment/Plan:      * Sepsis with acute organ dysfunction and septic shock  Patient with worsenomg shock, renal failure, and acidosis despite efforts at source control (status post endoscopic retrograde cholangiopancreatography), broad-spectrum antibiotics, renal replacement therapy.  Continue to titrate vasopressors with goal of maintaining mean arterial pressure of 65 mmHg.  Will in addition to norepinephrine and vasopressin will also start phenylephrine.  Start stress dose steroids.  Ventilation management per critical care.    Ascending cholangitis  Continue with antibiotic therapy.  Status post endoscopic retrograde cholangiopancreatography for source control.  Check ultrasound to assess for persistent obstruction and possible need for cholangiogram and possible percutaneous drain.    Choledocholithiasis  Status post endoscopic retrograde cholangiopancreatography for source control.  Checking ultrasound.    Acute biliary pancreatitis  Continue with intravenous fluids and plan on endoscopic retrograde cholangiopancreatography.    Acute renal failure superimposed on stage 3 chronic kidney disease  Secondary to severe sepsis and shock.   Continue with continuous renal replacement therapy.    Essential hypertension  Hold all blood pressure medication.    Obesity (BMI 30.0-34.9)  Body mass index is 32.08 kg/m².    Type 2 diabetes mellitus with diabetic neuropathy, without long-term current use of insulin  Start insulin infusion.        VTE Risk Mitigation (From admission, onward)         Ordered     heparin (porcine) injection 5,000 Units  Every 8 hours      03/14/20 1259     IP VTE HIGH RISK PATIENT  Once      03/14/20 1429     Place sequential compression device  Until discontinued      03/14/20 1429                Critical care time spent on the evaluation and treatment of severe organ dysfunction, review of pertinent labs and imaging studies, discussions with consulting providers and discussions with patient/family: 50 minutes.      John Rider MD  Department of Hospital Medicine   [facility]

## 2020-03-15 NOTE — SUBJECTIVE & OBJECTIVE
Interval History:  Patient needing higher doses of vasopressor support to mean reasonable blood pressure.  Patient continues to have severe acidosis despite renal replacement therapy and started on sodium bicarbonate infusion.    Review of Systems   Unable to perform ROS: Intubated     Objective:     Vital Signs (Most Recent):  Temp: (!) 92.7 °F (33.7 °C) (03/15/20 0821)  Pulse: 91 (03/15/20 0821)  Resp: (!) 23 (03/15/20 0821)  BP: 95/67 (03/15/20 0800)  SpO2: (!) 92 % (03/15/20 0821) Vital Signs (24h Range):  Temp:  [85.3 °F (29.6 °C)-99.1 °F (37.3 °C)] 92.7 °F (33.7 °C)  Pulse:  [] 91  Resp:  [18-34] 23  SpO2:  [73 %-100 %] 92 %  BP: ()/(38-85) 95/67     Weight: (104 kg)  Body mass index is 32.08 kg/m².    Intake/Output Summary (Last 24 hours) at 3/15/2020 1012  Last data filed at 3/15/2020 0600  Gross per 24 hour   Intake 6338.31 ml   Output 0 ml   Net 6338.31 ml      Physical Exam   Constitutional: He appears well-developed and well-nourished.   Obese   HENT:   Head: Normocephalic and atraumatic.   Nose: Nose normal.   Mouth/Throat: Oropharynx is clear and moist. No oropharyngeal exudate.   Eyes: Pupils are equal, round, and reactive to light. Conjunctivae and EOM are normal. Right eye exhibits no discharge. Left eye exhibits no discharge. No scleral icterus.   Neck: Normal range of motion. Neck supple. No JVD present. No tracheal deviation present. No thyromegaly present.   Cardiovascular: Regular rhythm, normal heart sounds and intact distal pulses. Exam reveals no gallop and no friction rub.   No murmur heard.  Tachycardic and regular this morning.  Central line triple lumen in right neck.  HD catheter in left neck.  Atrial line in left upper extremity.   Pulmonary/Chest: No stridor. He has no wheezes. He has no rales. He exhibits no tenderness.   Intubated with high oxygen requirement   Abdominal: He exhibits no distension and no mass. There is no rebound and no guarding.   Hypoactive bowel  sounds.   Musculoskeletal: Normal range of motion. He exhibits no edema or tenderness.   Lymphadenopathy:     He has no cervical adenopathy.   Neurological: He has normal reflexes. He displays normal reflexes. No cranial nerve deficit. He exhibits normal muscle tone. Coordination normal.   Sedated   Skin: Skin is warm and dry. Capillary refill takes less than 2 seconds. No rash noted. He is not diaphoretic. No erythema. No pallor.   Skin mottling involving his lower extremities and torso   Psychiatric: He has a normal mood and affect. His behavior is normal. Judgment and thought content normal.       Significant Labs: All pertinent labs within the past 24 hours have been reviewed.    Significant Imaging: I have reviewed all pertinent imaging results/findings within the past 24 hours.

## 2020-03-15 NOTE — NURSING
[facility]  ICU Multidisciplinary Bedside Rounds   SUMMARY     Date: 3/15/2020    Prehospitalization: Home  Admit Date / LOS : 3/14/2020/ 1 days    Diagnosis: Sepsis with acute organ dysfunction and septic shock    Consults:        Active: Cardio, GI, Nephro and Pulm CC       Needed: Palliative     Code Status: Full Code   Advanced Directive: <no information>    LDA: Dwayne Hugger, Aguirre, OG, PIV, TLC, Trialysis and Vent       Central Lines/Site/Justification:Multiple GTTS       Urinary Cath/Order/Justification:Critically Ill in the ICU and requiring intensive monitoring    Vasopressors/Infusions:    amiodarone in dextrose 5% 0.5 mg/min (03/15/20 0055)    insulin (HUMAN R) infusion (adults) 4.1 Units/hr (03/15/20 0819)    lactated ringers 200 mL/hr at 03/15/20 0530    norepinephrine bitartrate-D5W      propofoL 20 mcg/kg/min (03/15/20 0143)    sodium bicarbonate drip 100 mL/hr at 03/15/20 0734    vasopressin (PITRESSIN) infusion 0.04 Units/min (03/15/20 0734)          GOALS: Volume/ Hemodynamic: MAP >65                     RASS: -3  moderate sedation, movement or eye opening; no eye contact    CAM ICU: Positive  Pain Management: IV       Pain Controlled: yes     Rhythm: A-Fib    Respiratory Device: Vent    Vent Mode: PRVC  Oxygen Concentration (%):  [] 100  Resp Rate Total:  [18 br/min-33 br/min] 23 br/min  Vt Set:  [500 mL] 500 mL  PEEP/CPAP:  [5 cmH20-8 cmH20] 8 cmH20  Mean Airway Pressure:  [10.1 doP53-32.8 cmH20] 13.6 cmH20             Most Recent SBT/ SAT: Does not meet criteria       MOVE Screen: FAIL    VTE Prophylaxis: Pharm  Mobility: Bedrest  Stress Ulcer Prophylaxis: No    Dietary: NPO  Tolerance: yes  /  Advancement: yes    Isolation: No active isolations    Restraints: Yes    Significant Dates:  Post Op Date: N/A  Rescue Date: N/A  Imaging/ Diagnostics: N/A    Noteworthy Labs:  Calcium, CO2    CBC/Anemia Labs: Coags:    Recent Labs   Lab 03/14/20  0635 03/15/20  0330   WBC  26.89* 22.39*   HGB 18.7* 17.0   HCT 56.6* 55.1*    111*   MCV 91 100*   RDW 13.2 13.3    Recent Labs   Lab 03/14/20  1500   INR 1.1        Chemistries:   Recent Labs   Lab 03/14/20  0635 03/14/20  1833 03/15/20  0102 03/15/20  0450   * 132*  --  132*   K 4.9 5.2*  --  5.5*   CL 99 104  --  102   CO2 14* 13*  --  7*   BUN 31* 36*  --  32*   CREATININE 2.2* 3.2*  --  3.4*   CALCIUM 6.9* 5.9*  --  6.5*   PROT 6.1  --   --  5.7*   BILITOT 9.7*  --   --  2.8*   ALKPHOS 218*  --   --  258*   *  --   --  431*   *  --   --  363*   MG  --   --  2.0 2.2   PHOS  --   --   --  6.3*        Cardiac Enzymes: Ejection Fractions:    Recent Labs     03/14/20  0635 03/14/20  1833   TROPONINI 0.021 0.142*    No results found for: EF     POCT Glucose: HbA1c:    Recent Labs   Lab 03/15/20  0523 03/15/20  0619 03/15/20  0821   POCTGLUCOSE 420* 360* 386*    Hemoglobin A1C   Date Value Ref Range Status   11/01/2019 7.0 (H) 4.0 - 5.6 % Final     Comment:     ADA Screening Guidelines:  5.7-6.4%  Consistent with prediabetes  >or=6.5%  Consistent with diabetes  High levels of fetal hemoglobin interfere with the HbA1C  assay. Heterozygous hemoglobin variants (HbS, HgC, etc)do  not significantly interfere with this assay.   However, presence of multiple variants may affect accuracy.          Needs from Care Team: monitor BP, stabilize vital signs and wean off ventilator.     ICU LOS 18h  Level of Care: Critical Care

## 2020-03-15 NOTE — PROGRESS NOTES
Corrected calcium 6.5   - will give iV Calcium gluconate 2 g once     Blood culture growing GNR   - already on Meropenem + Ciprofloxacin   - may consult ID       2:38 AM   FSBS > 300  - start on Insulin drip         5:49 AM   ABG shows severe metabolic acidosis with respiratory acidosis  - give 2 amps of bicarb, started on bicarb drip at 100   - get ABG after 1 hour   - Vasopressin added   - 2 g IV Calcium as Ca low

## 2020-03-15 NOTE — NURSING
"Reported to dr. Rider, patient responding with slightly head turn and open eyes with asked, temp now improving. Questioned are we still getting renal labs. MD states not to "phlebotimize patient at this time". MD may restart CRRT if patient stable  "

## 2020-03-15 NOTE — ASSESSMENT & PLAN NOTE
Status post endoscopic retrograde cholangiopancreatography for source control.  Checking ultrasound.

## 2020-03-15 NOTE — NURSING
Spoke with Dr. Adry gonzalez glucose 250 and insulin gtt at 19.1 but according to insulin protocol increase insulin to 25.1 which is above gaurdrails of 20 units/hour. Md aware. Increase and check glucose in 1 hour. Reported to md had to restart Scott when CRRt started

## 2020-03-15 NOTE — PROGRESS NOTES
Renal (addendum)  Had to rinse back pte. From CRRT due to worsening hypotension, apparently ICU Nurses running CRRT last night thought that despite no net UF ment to remove what ever IVF volume pte. Was getting per hour, it is possible that pte. Did not tolerated that UF and resulted in hemodynamic instability.  Will make another attempt to re-start CRRT (pte. Acidosis has worsened) and make clear that no fluid will be removed at least for now.  Above discussed with Dr. Rider.

## 2020-03-15 NOTE — NURSING
Reported to Dr. Rider patient on 15mcg/kg/min of propofol. Md aware and states to lower dose to help patient wake up and possible help with bp. Reported to Md patient on Levo at 1.5 mcg

## 2020-03-15 NOTE — PLAN OF CARE
03/15/20 1624   Discharge Assessment   Assessment Type Discharge Planning Assessment   TN called spouse via phone to complete DC needs assessment.  No answer.  TN/SW to follow up at a later time.

## 2020-03-15 NOTE — NURSING
2335: CRRT started for patient with no complications. Verbal consent obtained by wife over phone with 2 RN verification to begin dialysis.  Will continue to monitor.

## 2020-03-15 NOTE — ASSESSMENT & PLAN NOTE
Most likely source for sepsis (gram negative franco bacteremia).    · S/P ERCP with sphincterotomy on 3/14/2020  · On appropriate antibiotics.  · High risk of pancreatitis post-ERCP, so will try to limit propofol dose as tolerated.

## 2020-03-15 NOTE — CONSULTS
Renal:  Discussed case with Dr. Rider  68 y/o diabetic and hypertensive admitted with abdominal pain x 2 days associated with N/V. Pte. Found severely hypotensive with lactic acidosis and oliguria; elevated wbc count and abnormal imaging (CBD stones w/ obstruction) consistent with choledocolithiasis with acute-severe  pancreatitis elevated wbc count concerning for ascending cholangitis/sepsis.  Pte. Remains hypotensive and anuric and his last ABGs had a pH of 7.11 despite initiation of HCO3- ggt.  Pte. A candidate for CRRT for lactic acidosis management.  Dr. Rider instructed to obtain a Fred cath. So CRRT can be initiated tonight.  Full consult to follow.

## 2020-03-15 NOTE — ANESTHESIA PROCEDURE NOTES
Arterial    Diagnosis: shock    Patient location during procedure: ICU  Procedure start time: 3/15/2020 9:21 AM  Timeout: 3/15/2020 9:20 AM  Procedure end time: 3/15/2020 9:40 AM    Staffing  Authorizing Provider: Andrew Gonzalez MD  Performing Provider: Andrew Gonzalez MD    Anesthesiologist was present at the time of the procedure.    Preanesthetic Checklist  Completed: patient identified, site marked, surgical consent, pre-op evaluation, timeout performed, IV checked, risks and benefits discussed, monitors and equipment checked and anesthesia consent givenArterial  Skin Prep: chlorhexidine gluconate  Local Infiltration: none  Orientation: left  Location: radial  Catheter Size: 20 G  Catheter placement by Ultrasound guidance. Heme positive aspiration all ports.  Vessel Caliber: small, patent, compressibility normal  Needle advanced into vessel with real time Ultrasound guidance.  Sterile sheath used.Insertion Attempts: 2  Assessment  Dressing: secured with tape and tegaderm  Patient: Tolerated well  Additional Notes  Attempted placement to distal left radial artery with very small caliber vessel identified with pulsatile flow. Difficult passing catheter into vessel and decision made to scan with ultrasound more proximal and successfully cannulated left radial artery.

## 2020-03-15 NOTE — NURSING
Reported to Dr. Ascencio patient bp dropping again. On jennifer but cuff reading 130/80. MD aware and reports jennifer more reliable than cuff at this time. Continue to increase Levo and start Scott.

## 2020-03-15 NOTE — EICU
Rounding (Video Assessment):  Yes    Intervention Initiated From:  [facility] / EICU    Romulo Communicated with Bedside Nurse regarding:  BP    Nurse Notified:  Yes    Doctor Notified:  No  67/42  Comments: camera into room due to b/p-67/42.  Nurse report she just went up on Levo drip.

## 2020-03-15 NOTE — ASSESSMENT & PLAN NOTE
Continue ventilatory support while critically ill.    · Lung protective ventilation given high risk for ARDS.  · ICU Best Practices to reduce the risk of complications of mechanical ventilation.  · Daily awakening/breathing trials as tolerated.  · Duo-nebs while intubated given asthma history => Symbicort once extubated.

## 2020-03-15 NOTE — NURSING
Patient remains in the ICU. On multiple drips. On insulin drip with accu checks q 1 hour. On ventilator with settings at FiO2 100%, peep 5, Rate 12 and . Patient does no follow commands, little to no response to pain. CRRT continues, tolerating well. Aguirre in place with no urine output. Afib with HR in the 90's. OG in place with minimal output of green bile. Temperature low on warming blanket. Family and patient. updated on plan of care. No skin breakdown, falls or injuries. Precautions met for all.

## 2020-03-15 NOTE — NURSING
2120: Trialysis placed in left IJ by MD Ortiz. Consent form signed by wife. Placement confirmed with chest x-ray. Will continue to monitor.

## 2020-03-15 NOTE — RESPIRATORY THERAPY
Results reported to Dr Hampton.   Rate increased to 22 per MD.   Results for ASHLIE ABEL JR. (MRN [id number]) as of 3/15/2020 06:23   Ref. Range 3/15/2020 04:13   POC PH Latest Ref Range: 7.35 - 7.45  6.878 (LL)   POC PCO2 Latest Ref Range: 35 - 45 mmHg 40.3   POC PO2 Latest Ref Range: 80 - 100 mmHg 74 (L)   POC BE Latest Ref Range: -2 to 2 mmol/L -26   POC HCO3 Latest Ref Range: 24 - 28 mmol/L 7.5 (L)   POC SATURATED O2 Latest Ref Range: 95 - 100 % 80 (L)   POC TCO2 Latest Ref Range: 23 - 27 mmol/L 9 (L)   FiO2 Unknown 100   Vt Unknown 500   PiP Unknown 21   PEEP Unknown 5   Sample Unknown ARTERIAL   DelSys Unknown Adult Vent   Allens Test Unknown Pass   Site Unknown RR   Mode Unknown AC/PRVC   Rate Unknown 18

## 2020-03-15 NOTE — PLAN OF CARE
Problem: Adult Inpatient Plan of Care  Goal: Plan of Care Review  Outcome: Met  Goal: Patient-Specific Goal (Individualization)  Outcome: Met  Goal: Absence of Hospital-Acquired Illness or Injury  Outcome: Met  Goal: Optimal Comfort and Wellbeing  Outcome: Met  Goal: Readiness for Transition of Care  Outcome: Met  Goal: Rounds/Family Conference  Outcome: Met     Problem: Communication Impairment (Mechanical Ventilation, Invasive)  Goal: Effective Communication  Outcome: Met     Problem: Device-Related Complication Risk (Mechanical Ventilation, Invasive)  Goal: Optimal Device Function  Outcome: Met     Problem: Inability to Wean (Mechanical Ventilation, Invasive)  Goal: Mechanical Ventilation Liberation  Outcome: Met     Problem: Nutrition Impairment (Mechanical Ventilation, Invasive)  Goal: Optimal Nutrition Delivery  Outcome: Met     Problem: Skin and Tissue Injury (Mechanical Ventilation, Invasive)  Goal: Absence of Device-Related Skin and Tissue Injury  Outcome: Met     Problem: Ventilator-Induced Lung Injury (Mechanical Ventilation, Invasive)  Goal: Absence of Ventilator-Induced Lung Injury  Outcome: Met     Problem: Communication Impairment (Artificial Airway)  Goal: Effective Communication  Outcome: Met     Problem: Device-Related Complication Risk (Artificial Airway)  Goal: Optimal Device Function  Outcome: Met     Problem: Skin and Tissue Injury (Artificial Airway)  Goal: Absence of Device-Related Skin or Tissue Injury  Outcome: Met     Problem: Noninvasive Ventilation Acute  Goal: Effective Unassisted Ventilation and Oxygenation  Outcome: Met     Problem: Infection  Goal: Infection Symptom Resolution  Outcome: Met     Problem: Diabetes Comorbidity  Goal: Blood Glucose Level Within Desired Range  Outcome: Met     Problem: Fall Injury Risk  Goal: Absence of Fall and Fall-Related Injury  Outcome: Met     Problem: Restraint, Nonbehavioral (Nonviolent)  Goal: Discontinuation Criteria Achieved  Outcome:  Met  Goal: Personal Dignity and Safety Maintained  Outcome: Met     Problem: Device-Related Complication Risk (CRRT (Continuous Renal Replacement Therapy))  Goal: Safe, Effective Therapy Delivery  Outcome: Met     Problem: Infection (CRRT (Continuous Renal Replacement Therapy))  Goal: Absence of Infection Signs/Symptoms  Outcome: Met     Problem: Hypothermia (CRRT (Continuous Renal Replacement Therapy))  Goal: Body Temperature Maintained in Desired Range  Outcome: Met     Problem: Skin Injury Risk Increased  Goal: Skin Health and Integrity  Outcome: Met

## 2020-03-15 NOTE — CONSULTS
[facility]  Pulmonology  Consult Note    Patient Name: Bonifacio Peoples Jr.  MRN: [id number]  Admission Date: 3/14/2020  Hospital Length of Stay: 1 days  Code Status: Full Code  Attending Physician: John Rider MD  Primary Care Provider: Justine Win MD   Principal Problem: Sepsis with acute organ dysfunction and septic shock    Inpatient consult to Pulmonology  Consult performed by: Mumtaz Ascencio MD  Consult ordered by: Edy Javed MD  Reason for consult: Septic shock        Subjective:     HPI:  Asked to evaluate this 69 year old man for septic shock presumed due to gram negative bacteremia in the setting of acute cholangitis.  He presented due to uncontrolled hyperglycemia (known type 2 diabetes on metformin).  In the ED, he was found to have acute kidney injury with lactic acidosis and increased liver function tests.    He received broad spectrum antibiotics (ciprofloxacin/meropenem;metronidazole), 4 liters of normal saline fluid resuscitation, and initiation of norepinephrine for BP support.  Prior to ICU arrival, he underwent ERCP with spincterotomy as well as intubation for the procedure.    Since arrival to the ICU, he has worsening urine output despite decreased modestly improved lactic acid and decreased vasopressor requirements.     Pulmonary history is notable for chronic asthma treated with Symbicort twice daily and prn albuterol.    Past Medical History:   Diagnosis Date    Anticoagulant long-term use     Arthritis     Rt and Lt wrist    Asthma, mild intermittent, well-controlled     Cataract     CKD (chronic kidney disease), stage III 1/20/2020    Depression     Diabetes mellitus     High cholesterol     Hypertension     Renal manifestation of secondary diabetes mellitus     Respiratory distress     Had to give a breathing treatment during last cysto procedure  2/10/12    Stroke     TIA  x3    TIA (transient ischemic attack)     Type 2 diabetes mellitus   "   Type 2 diabetes mellitus with diabetic neuropathy, without long-term current use of insulin        Past Surgical History:   Procedure Laterality Date    CARDIAC SURGERY  2010, 2008    repair openings to heart    CYSTOSCOPY W/ LASER LITHOTRIPSY      2/2012 unsuccessful with stone removal. Ureteral stent placed    ganglion cyst Right     KNEE ARTHROSCOPY W/ LASER Right     x 2    right wrist repair      TONSILLECTOMY      as a child       Review of patient's allergies indicates:   Allergen Reactions    Codeine      Childhood allergy, patient can't remember what happened.    Pcn [penicillins] Nausea And Vomiting    Zyrtec [cetirizine]      disorientated      Contrast media Rash     Pts wife states he is allergic to IV contrast "dye"    Iodine and iodide containing products Rash     IV dye contrast       Family History     Problem Relation (Age of Onset)    Diabetes Mother, Sister    Heart attack Sister    Heart disease Sister, Paternal Grandfather    No Known Problems Father        Tobacco Use    Smoking status: Never Smoker    Smokeless tobacco: Never Used   Substance and Sexual Activity    Alcohol use: Yes     Comment: occassionally every 2-3 months     Drug use: No    Sexual activity: Yes     Partners: Female         Review of Systems   Unable to perform ROS: Intubated     Objective:     Vital Signs (Most Recent):  Temp: 99.1 °F (37.3 °C) (03/14/20 1600)  Pulse: 107 (03/14/20 2100)  Resp: (!) 23 (03/14/20 2100)  BP: (!) 71/50 (03/14/20 1837)  SpO2: 97 % (03/14/20 2100) Vital Signs (24h Range):  Temp:  [98.6 °F (37 °C)-99.1 °F (37.3 °C)] 99.1 °F (37.3 °C)  Pulse:  [] 107  Resp:  [18-51] 23  SpO2:  [73 %-100 %] 97 %  BP: ()/(40-69) 71/50     Weight: (104 kg)  Body mass index is 32.08 kg/m².      Intake/Output Summary (Last 24 hours) at 3/15/2020 0126  Last data filed at 3/14/2020 1403  Gross per 24 hour   Intake 4450 ml   Output 0 ml   Net 4450 ml       Physical Exam   HENT:   Orally " intubated   Cardiovascular: Normal rate, regular rhythm and normal heart sounds. Exam reveals no gallop.   No murmur heard.  Pulmonary/Chest: No respiratory distress. He has no wheezes. He has no rales.   Abdominal: Soft. He exhibits distension. There is no tenderness. There is no rebound.   Musculoskeletal: He exhibits no edema.   Neurological:   On sedation.   Nursing note and vitals reviewed.      Vents:  Vent Mode: PRVC (03/14/20 1420)  Ventilator Initiated: Yes (03/14/20 1042)  Set Rate: 18 BPM (03/14/20 1837)  Vt Set: 500 mL (03/14/20 1837)  PEEP/CPAP: 5 cmH20 (03/14/20 1837)  Oxygen Concentration (%): 100 (03/14/20 2100)  Peak Airway Pressure: 24.3 cmH2O (03/14/20 1837)  Total Ve: 14 mL (03/14/20 1837)  F/VT Ratio<105 (RSBI): (!) 51.72 (03/14/20 1837)    Lines/Drains/Airways     Central Venous Catheter Line            Percutaneous Central Line Insertion/Assessment - Triple Lumen  03/14/20 1100 right internal jugular less than 1 day          Drain                 NG/OG Tube 03/14/20 1630 orogastric Center mouth less than 1 day          Airway                 Airway - Non-Surgical 03/14/20 1026 Endotracheal Tube less than 1 day          Peripheral Intravenous Line                 Peripheral IV - Single Lumen 03/14/20 0614 18 G Right Antecubital less than 1 day         Peripheral IV - Single Lumen 03/14/20 0707 18 G Right Hand less than 1 day                Significant Labs:    CBC/Anemia Profile:  Recent Labs   Lab 03/14/20  0635   WBC 26.89*   HGB 18.7*   HCT 56.6*      MCV 91   RDW 13.2        Chemistries:  Recent Labs   Lab 03/14/20  0635 03/14/20  1833   * 132*   K 4.9 5.2*   CL 99 104   CO2 14* 13*   BUN 31* 36*   CREATININE 2.2* 3.2*   CALCIUM 6.9* 5.9*   ALBUMIN 3.3*  --    PROT 6.1  --    BILITOT 9.7*  --    ALKPHOS 218*  --    *  --    *  --      Results for ASHLIE ABEL JR. (MRN [id number])    3/14/2020 10:12 3/14/2020 18:35   POC pH 7.140 (LL) 7.117 (LL)   POC PCO2 37.1 35.3    POC PO2 93 56 (LL)   POC BE -16 -17   POC HCO3 12.6 (L) 11.4 (L)   POC SAO2 94 (L) 78 (L)   POC TCO2 14 (L) 12 (L)   FiO2 100 50   Vt  500   PEEP  5   Flow 15    Mode SPONT AC/PRVC   Rate  18     Results for ASHLIE ABEL JR. (MRN [id number])    3/14/2020 06:35 3/14/2020 06:53 3/14/2020 18:03 3/14/2020 18:33    (H)      Troponin I 0.021   0.142 (H)   Lactate, Alonso  9.5 (HH) 7.1 (HH)        All pertinent labs within the past 24 hours have been reviewed.    Significant Imaging:   I have reviewed all pertinent imaging results/findings within the past 24 hours.    Assessment/Plan:     * Sepsis with acute organ dysfunction and septic shock  Consistent with gram negative septic shock most likely from gall bladder source.  Significant lactic acidosis with vasopressor requirements.    · Received adequate fluid resuscitation, antibiotics, and presumed source control.  · Titrate vasopressors as appropriate.  · Hold off on steroids for now unless significant increase in vasopressor needs.    On mechanically assisted ventilation  Intubated 3/14 due to septic shock with severe lactic acidosis prior to ERCP procedure.    Continue ventilatory support while critically ill.    · Lung protective ventilation given high risk for ARDS.  · ICU Best Practices to reduce the risk of complications of mechanical ventilation.  · Daily awakening/breathing trials as tolerated.  · Duo-nebs while intubated given asthma history => Symbicort once extubated.    Acute renal failure superimposed on stage 3 chronic kidney disease  Acute kidney injury in the setting of sepsis/septic shock typical for acute tubular necrosis.    · Given persistent acidosis and oligo/anuria, CRRT to begin as per Nephrology.  · Renally dose all medications as appropriate.  · Avoid nephrotoxins.    Ascending cholangitis  Most likely source for sepsis (gram negative franco bacteremia).    · S/P ERCP with sphincterotomy on 3/14/2020  · On appropriate antibiotics.  · High  risk of pancreatitis post-ERCP, so will try to limit propofol dose as tolerated.    Type 2 diabetes mellitus with diabetic neuropathy, without long-term current use of insulin  Uncontrolled hyperglycemia in face of sepsis.    · Hold metformin.  · POCT glucose checks and moderate scale insulin.  · Low threshold for iv insulin infusion to achieve glucose <200.      Time spent providing bedside critical care for life-threatening illness (not including procedure time) on ICU rounds on 3/14/2020 = 45 minutes.       Mumtaz Ascencio MD  Pulmonology  [facility]

## 2020-03-15 NOTE — NURSING
1015: Notified Marlon SMITH about patients critical calcium level, positive blood culture of gram negative rods as well as suggesting to increase levo concentration. Will continue to monitor.

## 2020-03-15 NOTE — SUBJECTIVE & OBJECTIVE
"Past Medical History:   Diagnosis Date    Anticoagulant long-term use     Arthritis     Rt and Lt wrist    Asthma, mild intermittent, well-controlled     Cataract     CKD (chronic kidney disease), stage III 1/20/2020    Depression     Diabetes mellitus     High cholesterol     Hypertension     Renal manifestation of secondary diabetes mellitus     Respiratory distress     Had to give a breathing treatment during last cysto procedure  2/10/12    Stroke     TIA  x3    TIA (transient ischemic attack)     Type 2 diabetes mellitus     Type 2 diabetes mellitus with diabetic neuropathy, without long-term current use of insulin        Past Surgical History:   Procedure Laterality Date    CARDIAC SURGERY  2010, 2008    repair openings to heart    CYSTOSCOPY W/ LASER LITHOTRIPSY      2/2012 unsuccessful with stone removal. Ureteral stent placed    ganglion cyst Right     KNEE ARTHROSCOPY W/ LASER Right     x 2    right wrist repair      TONSILLECTOMY      as a child       Review of patient's allergies indicates:   Allergen Reactions    Codeine      Childhood allergy, patient can't remember what happened.    Pcn [penicillins] Nausea And Vomiting    Zyrtec [cetirizine]      disorientated      Contrast media Rash     Pts wife states he is allergic to IV contrast "dye"    Iodine and iodide containing products Rash     IV dye contrast       Family History     Problem Relation (Age of Onset)    Diabetes Mother, Sister    Heart attack Sister    Heart disease Sister, Paternal Grandfather    No Known Problems Father        Tobacco Use    Smoking status: Never Smoker    Smokeless tobacco: Never Used   Substance and Sexual Activity    Alcohol use: Yes     Comment: occassionally every 2-3 months     Drug use: No    Sexual activity: Yes     Partners: Female         Review of Systems   Unable to perform ROS: Intubated     Objective:     Vital Signs (Most Recent):  Temp: 99.1 °F (37.3 °C) (03/14/20 " 1600)  Pulse: 107 (03/14/20 2100)  Resp: (!) 23 (03/14/20 2100)  BP: (!) 71/50 (03/14/20 1837)  SpO2: 97 % (03/14/20 2100) Vital Signs (24h Range):  Temp:  [98.6 °F (37 °C)-99.1 °F (37.3 °C)] 99.1 °F (37.3 °C)  Pulse:  [] 107  Resp:  [18-51] 23  SpO2:  [73 %-100 %] 97 %  BP: ()/(40-69) 71/50     Weight: (104 kg)  Body mass index is 32.08 kg/m².      Intake/Output Summary (Last 24 hours) at 3/15/2020 0126  Last data filed at 3/14/2020 1403  Gross per 24 hour   Intake 4450 ml   Output 0 ml   Net 4450 ml       Physical Exam   HENT:   Orally intubated   Cardiovascular: Normal rate, regular rhythm and normal heart sounds. Exam reveals no gallop.   No murmur heard.  Pulmonary/Chest: No respiratory distress. He has no wheezes. He has no rales.   Abdominal: Soft. He exhibits distension. There is no tenderness. There is no rebound.   Musculoskeletal: He exhibits no edema.   Neurological:   On sedation.   Nursing note and vitals reviewed.      Vents:  Vent Mode: PRVC (03/14/20 1420)  Ventilator Initiated: Yes (03/14/20 1042)  Set Rate: 18 BPM (03/14/20 1837)  Vt Set: 500 mL (03/14/20 1837)  PEEP/CPAP: 5 cmH20 (03/14/20 1837)  Oxygen Concentration (%): 100 (03/14/20 2100)  Peak Airway Pressure: 24.3 cmH2O (03/14/20 1837)  Total Ve: 14 mL (03/14/20 1837)  F/VT Ratio<105 (RSBI): (!) 51.72 (03/14/20 1837)    Lines/Drains/Airways     Central Venous Catheter Line            Percutaneous Central Line Insertion/Assessment - Triple Lumen  03/14/20 1100 right internal jugular less than 1 day          Drain                 NG/OG Tube 03/14/20 1630 orogastric Center mouth less than 1 day          Airway                 Airway - Non-Surgical 03/14/20 1026 Endotracheal Tube less than 1 day          Peripheral Intravenous Line                 Peripheral IV - Single Lumen 03/14/20 0614 18 G Right Antecubital less than 1 day         Peripheral IV - Single Lumen 03/14/20 0707 18 G Right Hand less than 1 day                 Significant Labs:    CBC/Anemia Profile:  Recent Labs   Lab 03/14/20  0635   WBC 26.89*   HGB 18.7*   HCT 56.6*      MCV 91   RDW 13.2        Chemistries:  Recent Labs   Lab 03/14/20  0635 03/14/20  1833   * 132*   K 4.9 5.2*   CL 99 104   CO2 14* 13*   BUN 31* 36*   CREATININE 2.2* 3.2*   CALCIUM 6.9* 5.9*   ALBUMIN 3.3*  --    PROT 6.1  --    BILITOT 9.7*  --    ALKPHOS 218*  --    *  --    *  --      Results for ABELASHLIE GLASS JR. (MRN [id number])    3/14/2020 10:12 3/14/2020 18:35   POC pH 7.140 (LL) 7.117 (LL)   POC PCO2 37.1 35.3   POC PO2 93 56 (LL)   POC BE -16 -17   POC HCO3 12.6 (L) 11.4 (L)   POC SAO2 94 (L) 78 (L)   POC TCO2 14 (L) 12 (L)   FiO2 100 50   Vt  500   PEEP  5   Flow 15    Mode SPONT AC/PRVC   Rate  18     Results for ABELASHLIE GLASS JR. (MRN [id number])    3/14/2020 06:35 3/14/2020 06:53 3/14/2020 18:03 3/14/2020 18:33    (H)      Troponin I 0.021   0.142 (H)   Lactate, Alonso  9.5 (HH) 7.1 (HH)        All pertinent labs within the past 24 hours have been reviewed.    Significant Imaging:   I have reviewed all pertinent imaging results/findings within the past 24 hours.

## 2020-03-15 NOTE — ASSESSMENT & PLAN NOTE
Consistent with gram negative septic shock most likely from gall bladder source.  Significant lactic acidosis with vasopressor requirements.    · Received adequate fluid resuscitation, antibiotics, and presumed source control.  · Titrate vasopressors as appropriate.  · Hold off on steroids for now unless significant increase in vasopressor needs.

## 2020-03-15 NOTE — NURSING
0550: Notified eICU of critical labs with new orders as well as increasing concentration for levo. Will continue to monitor

## 2020-03-15 NOTE — PROGRESS NOTES
Chief Complaint / Reason for Consult: Abd. Pain, Abnormal LFT's, concern for cholangitis    Pt. Remains intubated, on vent.  Now on pressors, CRRT    ROS: Unable to obtain, secondary to current clinical condition    Patient Vitals for the past 24 hrs:   BP Temp Temp src Pulse Resp SpO2 Height Weight   03/15/20 0821 -- (!) 92.7 °F (33.7 °C) -- 91 (!) 23 (!) 92 % -- --   03/15/20 0800 95/67 (!) 92.7 °F (33.7 °C) -- 87 (!) 23 (!) 91 % -- --   03/15/20 0730 (!) 81/50 (!) 92.7 °F (33.7 °C) -- 92 (!) 22 (!) 91 % -- --   03/15/20 0715 92/64 (!) 92.7 °F (33.7 °C) -- 96 (!) 22 (!) 91 % -- --   03/15/20 0700 98/69 (!) 92.7 °F (33.7 °C) -- 98 (!) 22 (!) 94 % -- --   03/15/20 0645 137/85 (!) 92.7 °F (33.7 °C) -- 100 (!) 22 98 % -- --   03/15/20 0630 105/71 (!) 92.5 °F (33.6 °C) -- 87 (!) 23 (!) 89 % -- --   03/15/20 0615 112/67 (!) 92.5 °F (33.6 °C) -- 90 (!) 24 (!) 90 % -- --   03/15/20 0600 119/67 (!) 92.7 °F (33.7 °C) -- 90 (!) 23 (!) 88 % -- --   03/15/20 0545 115/73 (!) 92.7 °F (33.7 °C) -- 85 (!) 23 (!) 90 % -- --   03/15/20 0530 94/62 (!) 92.7 °F (33.7 °C) -- 87 (!) 23 (!) 92 % -- --   03/15/20 0515 96/60 (!) 92.7 °F (33.7 °C) -- 90 (!) 23 (!) 93 % -- --   03/15/20 0500 95/64 (!) 92.7 °F (33.7 °C) -- 88 (!) 23 (!) 89 % -- --   03/15/20 0445 96/67 (!) 92.7 °F (33.7 °C) -- 87 (!) 22 (!) 90 % -- --   03/15/20 0430 94/65 (!) 92.7 °F (33.7 °C) -- 88 (!) 23 (!) 91 % -- --   03/15/20 0415 -- (!) 92.7 °F (33.7 °C) -- 89 (!) 22 (!) 93 % -- --   03/15/20 0413 97/71 (!) 92.7 °F (33.7 °C) -- 90 (!) 23 (!) 92 % -- --   03/15/20 0400 95/63 (!) 92.8 °F (33.8 °C) -- 88 (!) 23 (!) 91 % -- --   03/15/20 0345 (!) 84/55 (!) 92.8 °F (33.8 °C) -- 88 (!) 23 (!) 89 % -- --   03/15/20 0330 (!) 84/63 (!) 92.8 °F (33.8 °C) -- 91 (!) 22 (!) 89 % -- --   03/15/20 0315 99/65 (!) 85.3 °F (29.6 °C) -- 88 (!) 22 (!) 89 % -- --   03/15/20 0300 100/60 -- -- 90 (!) 22 (!) 89 % -- --   03/15/20 0245 107/71 -- -- 90 (!) 23 (!) 88 % -- --   03/15/20 0230 --  -- -- 91 (!) 23 (!) 92 % -- --   03/15/20 0215 -- -- -- 91 (!) 22 (!) 93 % -- --   03/15/20 0200 -- -- -- 94 (!) 22 (!) 94 % -- --   03/15/20 0145 -- -- -- 94 (!) 23 (!) 90 % -- --   03/15/20 0135 (!) 100/58 -- -- 91 (!) 23 (!) 91 % -- --   03/15/20 0130 -- -- -- 91 (!) 23 (!) 90 % -- --   03/15/20 0115 93/61 -- -- 90 (!) 23 (!) 91 % -- --   03/15/20 0100 (!) 85/54 -- -- 83 (!) 24 (!) 90 % -- --   03/15/20 0045 (!) 72/50 -- -- 75 (!) 23 (!) 91 % -- --   03/15/20 0030 (!) 66/44 -- -- 74 (!) 23 (!) 89 % -- --   03/15/20 0015 (!) 103/38 -- -- 79 (!) 23 (!) 89 % -- --   03/15/20 0000 -- -- -- 90 (!) 24 (!) 91 % -- --   03/14/20 2345 -- -- -- 89 (!) 24 (!) 93 % -- --   03/14/20 2330 -- -- -- 91 (!) 23 (!) 94 % -- --   03/14/20 2315 -- -- -- 91 (!) 23 (!) 93 % -- --   03/14/20 2300 -- -- -- 96 (!) 24 (!) 94 % -- --   03/14/20 2245 -- -- -- 98 (!) 24 (!) 94 % -- --   03/14/20 2230 -- -- -- 101 (!) 24 (!) 93 % -- --   03/14/20 2225 -- -- -- 102 (!) 26 (!) 94 % -- --   03/14/20 2215 (!) 99/55 -- -- 106 (!) 25 (!) 94 % -- --   03/14/20 2200 -- -- -- 104 (!) 24 (!) 93 % -- --   03/14/20 2145 -- -- -- 102 (!) 26 (!) 93 % -- --   03/14/20 2130 -- -- -- 109 (!) 25 (!) 94 % -- --   03/14/20 2115 99/66 -- -- 110 (!) 25 96 % -- --   03/14/20 2100 -- -- -- 107 (!) 23 97 % -- --   03/14/20 2045 -- -- -- 103 (!) 24 (!) 91 % -- --   03/14/20 2030 (!) 80/54 -- -- 108 (!) 24 (!) 93 % -- --   03/14/20 2015 -- -- -- 108 (!) 24 95 % -- --   03/14/20 2000 -- -- -- 110 (!) 25 (!) 93 % -- --   03/14/20 1945 90/60 -- -- 110 (!) 25 (!) 93 % -- --   03/14/20 1930 (!) 79/51 -- -- 107 (!) 24 (!) 93 % -- --   03/14/20 1837 (!) 71/50 -- -- 89 (!) 27 (!) 73 % -- --   03/14/20 1835 (!) 80/52 -- -- (!) 125 (!) 27 (!) 74 % -- --   03/14/20 1830 (!) 66/50 -- -- 92 (!) 27 (!) 74 % -- --   03/14/20 1815 (!) 59/40 -- -- 99 (!) 25 (!) 88 % -- --   03/14/20 1800 -- -- -- (!) 111 20 (!) 88 % -- --   03/14/20 1745 (!) 78/60 -- -- 110 19 (!) 89 % -- --   03/14/20  "1730 94/64 -- -- (!) 114 20 (!) 90 % -- --   03/14/20 1715 101/63 -- -- (!) 111 (!) 21 (!) 90 % -- --   03/14/20 1700 (!) 90/59 -- -- 103 19 (!) 90 % -- --   03/14/20 1645 (!) 92/51 -- -- 99 19 (!) 90 % 5' 11" (1.803 m) --   03/14/20 1630 (!) 84/64 -- -- (!) 117 18 (!) 91 % -- --   03/14/20 1615 (!) 85/57 -- -- (!) 120 19 (!) 91 % -- --   03/14/20 1600 91/65 99.1 °F (37.3 °C) Oral 104 18 (!) 91 % -- --   03/14/20 1545 (!) 67/50 -- -- 107 18 (!) 92 % -- --   03/14/20 1530 -- -- -- 109 18 (!) 90 % -- --   03/14/20 1515 -- -- -- (!) 115 18 (!) 92 % -- --   03/14/20 1500 -- 99.1 °F (37.3 °C) Oral (!) 121 19 (!) 93 % -- --   03/14/20 1445 -- -- -- (!) 120 19 95 % -- --   03/14/20 1430 -- -- -- (!) 120 18 98 % -- --   03/14/20 1408 115/65 99.1 °F (37.3 °C) Oral (!) 115 18 97 % -- --   03/14/20 1205 103/69 -- -- -- -- -- -- --   03/14/20 1131 98/60 99.1 °F (37.3 °C) Oral 94 -- 100 % -- --   03/14/20 1113 (!) 70/49 -- -- 97 (!) 34 100 % -- --   03/14/20 1045 (!) 71/53 -- -- 100 (!) 34 100 % -- --   03/14/20 1042 (!) 67/47 -- -- 102 (!) 28 99 % -- --       Physical Exam:  Gen - Well developed, well nourished, intubated, on vent  HEENT - Anicteric, + ET/OG tubes  CV - S1, S2, no murmurs/rubs  Lungs - CTA-B, normal excursion  Abd - Soft, NT, ND, normal BS's, no HSM.  Ext - No c/c/e  Neuro - No asterixis    Labs:  Recent Labs   Lab 03/15/20  0330   WBC 22.39*   RBC 5.53   HGB 17.0   HCT 55.1*   *   *   MCH 30.7   MCHC 30.9*     Recent Labs   Lab 03/15/20  0450   CALCIUM 6.5*   PROT 5.7*   *   K 5.5*   CO2 7*      BUN 32*   CREATININE 3.4*   ALKPHOS 258*   *   *   BILITOT 2.8*     Recent Labs   Lab 03/14/20  1500   INR 1.1       Imaging:  Reviewed CT, + CBD stones with biliary dilation    Assessment:  This patient is a 69 y.o. male with:   1. Elevated WBC/Lactate - highly suggestive/concerning for ascending cholangitis.  Blood Cx's + for GNR.  Now is s/p ERCP with sphincterotomy/stone " extraction.    2. Abnormal GI imaging, CBD stones with obstruction, biliary dilation  3. Biliary Pancreatitis, acute/severe  4. Abnormal LFT's - secondary to above.  Improving.  5. Severe sepsis - On Abx, CRRT, supportive care.  6. Hx of DM, Asthma, HTN, CKD, TIA, Hypercholesterolemia....    Recommendations:  1. Continue with supportive care.  2. Abx, CRRT, Pressors.  3. Will follow with you.

## 2020-03-15 NOTE — NURSING
Notified Marlon SMITH of elevated BS of 452. Gave 5 units of Novolog and rechecked BS and was 379. Ordered insulin drip. Will continue to monitor.

## 2020-03-15 NOTE — ASSESSMENT & PLAN NOTE
Continue with antibiotic therapy.  Status post endoscopic retrograde cholangiopancreatography for source control.  Check ultrasound to assess for persistent obstruction and possible need for cholangiogram and possible percutaneous drain.

## 2020-03-15 NOTE — PROCEDURES
[facility]  Central Venous Catheter  Procedure Note    SUMMARY     Date of Procedure: 3/14/2020     Procedure: Insertion of Central Venous Catheter    Provider: Lai Ortiz MD    Assisting Provider: None    Indications: Dialysis access     Pre-Procedure Diagnosis: Acute renal failure, Lactic Acidosis    Post-Procedure Diagnosis: Same    Anesthesia: Patient sedated with propofol per ICU team. Local given     Technical Procedures Used: U/S guidance    Description of the Findings of the Procedure:    Informed consent was obtained for the procedure, including sedation.  Risks of lung perforation, hemorrhage, arrhythmia, and adverse drug reaction were discussed.     Maximum sterile technique was used including antiseptics, cap, gloves, gown, hand hygiene, mask and sheet.    Under sterile conditions the skin above the on the left internal jugular vein was prepped with betadine and covered with a sterile drape. Local anesthesia was applied to the skin and subcutaneous tissues. A 22-gauge needle was used to identify the vein. An 18-gauge needle was then inserted into the vein. A guide wire was then passed easily through the needle. There were no arrhythmias. The catheter was then withdrawn. A skin nick was made with a knife and the tract was serially dilated. A 16cm triple lumen dialysis CVC was then inserted into the vessel over the guide wire. Blood was able to be easily withdrawn from all ports and flushed easily. The catheter was sutured into place and a sterile dressing applied    There were no changes to vital signs. Catheter was flushed with 20 cc NS. Patient did tolerate procedure well.    Recommendations:    CXR ordered to verify placement.      Complications: No    Estimated Blood Loss (EBL): None    Implants: 16cm Dialysis CVC    Specimens: None    Condition: Critical    Disposition: Remain in ICU in critical condition

## 2020-03-15 NOTE — HPI
Asked to evaluate this 69 year old man for septic shock presumed due to gram negative bacteremia in the setting of acute cholangitis.  He presented due to uncontrolled hyperglycemia (known type 2 diabetes on metformin).  In the ED, he was found to have acute kidney injury with lactic acidosis and increased liver function tests.    He received broad spectrum antibiotics (ciprofloxacin/meropenem;metronidazole), 4 liters of normal saline fluid resuscitation, and initiation of norepinephrine for BP support.  Prior to ICU arrival, he underwent ERCP with spincterotomy as well as intubation for the procedure.    Since arrival to the ICU, he has worsening urine output despite decreased modestly improved lactic acid and decreased vasopressor requirements.     Pulmonary history is notable for chronic asthma treated with Symbicort twice daily and prn albuterol.

## 2020-03-15 NOTE — ASSESSMENT & PLAN NOTE
Uncontrolled hyperglycemia in face of sepsis.    · Hold metformin.  · POCT glucose checks and moderate scale insulin.  · Low threshold for iv insulin infusion to achieve glucose <200.

## 2020-03-15 NOTE — CONSULTS
Renal ICU Consult    Date of Admission:  3/14/2020  6:18 AM        Chief Complaint:   Chief Complaint   Patient presents with    Abdominal Pain     Patient reports abdominal pain N/V X 1 day       HPI: 69 y.o. male with a PMHx. Significant for: hypertension, diabetes mellitus type 2, and chronic kidney disease stage 3 who presented to [facility] ED with c.o. abd. Pain associated with N/V x 2 days.  Pte. Has now been diagnosed with CBD stones, possible ascending Cholangitis, Pancreatitis and GNR sepsis with septic shock.  Pte. Was anuric on presentation and with severe lactic acidosis, he was admitted to ICU and intubated following ERCP.   Case was discussed last night with Dr. Schwab (admitting) and decided to initiate CRRT in hopes to control acidosis.    PMH:  Past Medical History:   Diagnosis Date    Anticoagulant long-term use     Arthritis     Rt and Lt wrist    Asthma, mild intermittent, well-controlled     Cataract     CKD (chronic kidney disease), stage III 1/20/2020    Depression     Diabetes mellitus     High cholesterol     Hypertension     Renal manifestation of secondary diabetes mellitus     Respiratory distress     Had to give a breathing treatment during last cysto procedure  2/10/12    Stroke     TIA  x3    TIA (transient ischemic attack)     Type 2 diabetes mellitus     Type 2 diabetes mellitus with diabetic neuropathy, without long-term current use of insulin        PSH:  Past Surgical History:   Procedure Laterality Date    CARDIAC SURGERY  2010, 2008    repair openings to heart    CYSTOSCOPY W/ LASER LITHOTRIPSY      2/2012 unsuccessful with stone removal. Ureteral stent placed    ganglion cyst Right     KNEE ARTHROSCOPY W/ LASER Right     x 2    right wrist repair      TONSILLECTOMY      as a child       Allergies:  Review of patient's allergies indicates:   Allergen Reactions    Codeine      Childhood allergy, patient can't remember  "what happened.    Pcn [penicillins] Nausea And Vomiting    Zyrtec [cetirizine]      disorientated      Contrast media Rash     Pts wife states he is allergic to IV contrast "dye"    Iodine and iodide containing products Rash     IV dye contrast       No current facility-administered medications on file prior to encounter.      Current Outpatient Medications on File Prior to Encounter   Medication Sig Dispense Refill    ACCU-CHEK [place] PLUS METER [facility] [place] 1 each 0    aspirin (ECOTRIN) 81 MG EC tablet Take 81 mg by mouth after dinner. .      atorvastatin (LIPITOR) 40 MG tablet Take 1 tablet (40 mg total) by mouth once daily. 90 tablet 3    azelastine (ASTELIN) 137 mcg (0.1 %) nasal spray 1 spray (137 mcg total) by Nasal route 2 (two) times daily. 30 mL 0    bisoprolol-hydrochlorothiazide 2.5-6.25 mg (ZIAC) 2.5-6.25 mg Tab Take 1 tablet by mouth every morning. 90 tablet 1    blood sugar diagnostic Strp 100 strips by Misc.(Non-Drug; Combo Route) route once daily. 100 strip 3    citalopram (CELEXA) 40 MG tablet TAKE 1 TABLET EVERY MORNING 90 tablet 1    gabapentin (NEURONTIN) 300 MG capsule Take 1 capsule (300 mg total) by mouth 3 (three) times daily. 270 capsule 1    lancets (ACCU-CHEK SOFTCLIX LANCETS) Misc 1 Box by Misc.(Non-Drug; Combo Route) route once daily. 100 each 3    lisinopril (PRINIVIL,ZESTRIL) 2.5 MG tablet Take 1 tablet (2.5 mg total) by mouth once daily. 90 tablet 1    meclizine (ANTIVERT) 25 mg tablet Take 1 tablet (25 mg total) by mouth 3 (three) times daily as needed. 30 tablet 0    pioglitazone (ACTOS) 30 MG tablet Take 1 tablet (30 mg total) by mouth once daily. (Patient not taking: Reported on 1/20/2020) 90 tablet 0    ranitidine (ZANTAC) 300 MG tablet TAKE ONE TABLET BY MOUTH EVERY EVENING 90 tablet 3    SYMBICORT 160-4.5 mcg/actuation HFAA Inhale 2 puffs into the lungs every 12 (twelve) hours. 10.2 g 5    VENTOLIN HFA 90 mcg/actuation inhaler INHALE TWO PUFFS BY " MOUTH EVERY SIX HOURS AS NEEDED FOR SHORTNESS OF BREATH OR WHEEZING 18 g 2       Medications:  Current Facility-Administered Medications   Medication Dose Route Frequency Provider Last Rate Last Dose    amiodarone 360 mg/200 mL (1.8 mg/mL) infusion  0.5 mg/min Intravenous Continuous John Rider MD 16.7 mL/hr at 03/15/20 0906 0.5 mg/min at 03/15/20 0906    chlorhexidine 0.12 % solution 15 mL  15 mL Mouth/Throat BID Mumtaz Ascencio MD   15 mL at 03/15/20 0847    ciprofloxacin (CIPRO)400mg/200ml D5W IVPB 400 mg  400 mg Intravenous Q12H Edy Javed  mL/hr at 03/15/20 0819 400 mg at 03/15/20 0819    dextrose 50% injection 12.5 g  12.5 g Intravenous PRN Guillaume Mckeon MD        dextrose 50% injection 25 g  25 g Intravenous PRN Guillaume Mckeon MD        famotidine (PF) injection 20 mg  20 mg Intravenous Daily John Rider MD   20 mg at 03/15/20 0910    heparin (porcine) injection 5,000 Units  5,000 Units Subcutaneous Q8H Edy Javed MD   5,000 Units at 03/15/20 0647    hydrocortisone sodium succinate injection 100 mg  100 mg Intravenous Q8H John Rider MD        hydromorphone (PF) injection 0.5 mg  0.5 mg Intravenous Q1H PRN Duncan Dutta MD        insulin regular 100 Units in sodium chloride 0.9% 100 mL infusion  2 Units/hr Intravenous Continuous Guillaume Mckeon MD 5.1 mL/hr at 03/15/20 1000 5.1 Units/hr at 03/15/20 1000    magnesium sulfate 2g in water 50mL IVPB (premix)  2 g Intravenous PRN Gopal Vail MD        meropenem (MERREM) 2 g in sodium chloride 0.9% 100 mL IVPB  2 g Intravenous Q12H Edy Javed  mL/hr at 03/15/20 0946 2 g at 03/15/20 0946    norepinephrine 32 mg in dextrose 5 % 250 mL infusion  0.04 mcg/kg/min Intravenous Continuous John Rider MD 73.3 mL/hr at 03/15/20 0914 1.5 mcg/kg/min at 03/15/20 0914    norepinephrine bitartrate-D5W 4 mg/250 mL (16 mcg/mL) infusion Soln             ondansetron injection 4 mg  4 mg  Intravenous Q8H PRN Duncan Dutta MD   4 mg at 03/14/20 1523    propofol (DIPRIVAN) 10 mg/mL infusion  5 mcg/kg/min Intravenous Continuous Mumtaz Ascencio MD 12.5 mL/hr at 03/15/20 0906 20 mcg/kg/min at 03/15/20 0906    sodium bicarbonate 1 mEq/mL (8.4 %) 150 mEq in dextrose 5 % 1,000 mL infusion   Intravenous Continuous Jose Hampton  mL/hr at 03/15/20 0734      sodium bicarbonate 1 mEq/mL (8.4 %) solution             sodium chloride 0.9% flush 10 mL  10 mL Intravenous PRN Duncan Dutta MD        sodium phosphate 20.01 mmol in dextrose 5 % 250 mL IVPB  20.01 mmol Intravenous PRN Gopal Vail MD        sodium phosphate 30 mmol in dextrose 5 % 250 mL IVPB  30 mmol Intravenous PRN Gopal aVil MD        sodium phosphate 39.99 mmol in dextrose 5 % 250 mL IVPB  39.99 mmol Intravenous PRN Gopal Vail MD        vasopressin (PITRESSIN) 0.2 Units/mL in dextrose 5 % 100 mL infusion  0.04 Units/min Intravenous Continuous Guillaume Mckeon MD 12 mL/hr at 03/15/20 0734 0.04 Units/min at 03/15/20 0734       FamHx:  Family History   Problem Relation Age of Onset    Diabetes Mother     No Known Problems Father     Diabetes Sister     Heart attack Sister     Heart disease Sister     Heart disease Paternal Grandfather        SocHx:  Social History     Socioeconomic History    Marital status:      Spouse name: Not on file    Number of children: Not on file    Years of education: Not on file    Highest education level: Not on file   Occupational History    Not on file   Social Needs    Financial resource strain: Not on file    Food insecurity:     Worry: Not on file     Inability: Not on file    Transportation needs:     Medical: Not on file     Non-medical: Not on file   Tobacco Use    Smoking status: Never Smoker    Smokeless tobacco: Never Used   Substance and Sexual Activity    Alcohol use: Yes     Comment: occassionally every 2-3 months      Drug use: No    Sexual activity: Yes     Partners: Female   Lifestyle    Physical activity:     Days per week: Not on file     Minutes per session: Not on file    Stress: Not on file   Relationships    Social connections:     Talks on phone: Not on file     Gets together: Not on file     Attends [employer] service: Not on file     Active member of club or organization: Not on file     Attends meetings of clubs or organizations: Not on file     Relationship status: Not on file   Other Topics Concern    Not on file   Social History Narrative    Son and 3 stepchildren lives close by           Review of Systems: see H&P       Physical Exam:  Vitals:   Vitals:    03/15/20 0821   BP:    Pulse: 91   Resp: (!) 23   Temp: (!) 92.7 °F (33.7 °C)       I/O last 3 completed shifts:  In: 9338.3 [I.V.:6338.3; IV Piggyback:3000]  Out: 0   No intake/output data recorded.    General: On the Vent + jaundice  Neck: n/a  Lungs:   Heart: RRR  Abdomen: Distended  : n/a  Ext: n/a  Neurologic: Sedated      Laboratories:    Recent Labs   Lab 03/15/20  0330   WBC 22.39*   RBC 5.53   HGB 17.0   HCT 55.1*   *   *   MCH 30.7   MCHC 30.9*       Recent Labs   Lab 03/15/20  0450   CALCIUM 6.5*   PROT 5.7*   *   K 5.5*   CO2 7*      BUN 32*   CREATININE 3.4*   ALKPHOS 258*   *   *   BILITOT 2.8*       No results for input(s): COLORU, CLARITYU, SPECGRAV, PHUR, PROTEINUA, GLUCOSEU, BLOODU, WBCU, RBCU, BACTERIA, MUCUS in the last 24 hours.    Invalid input(s):  BILIRUBINCON    Microbiology Results (last 7 days)     Procedure Component Value Units Date/Time    Blood culture [[id number]] Collected:  03/14/20 0653    Order Status:  Completed Specimen:  Blood from Peripheral, Antecubital, Left Updated:  03/15/20 0903     Blood Culture, Routine No Growth to date      No Growth to date    Blood culture [[id number]]  (Abnormal) Collected:  03/14/20 0653    Order Status:  Completed Specimen:  Blood from Peripheral,  Antecubital, Left Updated:  03/15/20 0857     Blood Culture, Routine Gram stain salvador bottle: Gram negative rods 03/14/2020  21:42       Results called to and read back by: Saige Ybarra      GRAM NEGATIVE ANJALI  Identification and susceptibility pending              Diagnostic Tests:    XR CHEST AP PORTABLE    CLINICAL HISTORY:  CENTRAL LINE PLACEMENT;    TECHNIQUE:  Single frontal view of the chest was performed.    COMPARISON:  19:07.    FINDINGS:  Interval placement of left IJ central venous catheter with distal tip seen over the SVC.  Right-sided central venous catheter, ET tube, enteric tubes appear in stable positions.  Lungs are hypoinflated.  No significant change in cardiopulmonary status from earlier exam.  No pneumothorax seen.    CT ABDOMEN PELVIS WITHOUT CONTRAST    FINDINGS:  There are small bilateral pleural effusions.  Patchy basilar opacities, right greater than left which could be related to atelectasis, aspiration or pneumonia.  The base of the heart shows coronary artery disease.  Calcified atheromatous disease affects the aorta and its branch vessels.    The gallbladder is hydropic.  There is hyperdense debris seen within the common bile duct concerning for stones.  There is mild intrahepatic biliary ductal dilatation.  Fatty infiltration of the enlarged liver.  The pancreas is severely edematous and there is significant peripancreatic inflammatory stranding with surrounding fluid along the pericolic gutter compatible with acute pancreatitis.  Evaluation for necrosis limited given the lack of intravenous contrast material.  The spleen, adrenal glands and kidneys are normal in size, shape and contour.  Nonobstructing nephrolithiasis is seen on the right.  No obstructive uropathy is seen.  The urinary bladder is not well distended and could not be adequately evaluated.  There is some thickening of the walls of the bladder which could relate to under distension versus an infectious process.   Correlation with urinalysis is recommended.    Imaging findings compatible with choledocholithiasis with acute pancreatitis.  There is either 1 large stone versus multiple stones seen within the common bile duct.  There is associated intrahepatic biliary ductal dilatation.  Evaluation for pancreatic necrosis is limited given the lack of intravenous contrast material.    Small bilateral pleural effusions with basilar patchy opacities which could be related to atelectasis, aspiration or pneumonia      Assessment:    68 y/o male admitted with:    - Choledocholithiasis with presumed acute cholangitis  - Acute Pancreatitis  - GNR sepsis w/ septic shock  - Lactic acidosis  - LISSA anuric ATN likely  - HyperK+  - Hyperphosphatemia  - Hypocalcemia  - r/o aspiration PNA  - DM-2 with hyperglycemia  - Proteinuria  - Hx. CKD-3    Pte. Remains critical condition with refractory shock and acidosis despite CRRT.  Doubt survival.  As per Dr. Rider Family has been updated.        Plan:    - CRRT (CVVHD)  in progress, adjust dialyzate K+ and increase BFR and DFR, no UF at present  - Stop [employer]  - IV HCO3- ggt  - Insulin ggt  - Antibiotics per admitting  - GI following s/p ERCP w/ sphincterotomy  - Vent. Support per Pulmonary        30 min ICU time spent.

## 2020-03-15 NOTE — ASSESSMENT & PLAN NOTE
Acute kidney injury in the setting of sepsis/septic shock typical for acute tubular necrosis.    · Given persistent acidosis and oligo/anuria, CRRT to begin as per Nephrology.  · Renally dose all medications as appropriate.  · Avoid nephrotoxins.

## 2020-03-16 PROBLEM — J96.01 ACUTE HYPOXEMIC RESPIRATORY FAILURE: Status: ACTIVE | Noted: 2020-01-01

## 2020-03-16 PROBLEM — Z51.5 PALLIATIVE CARE ENCOUNTER: Status: ACTIVE | Noted: 2020-01-01

## 2020-03-16 NOTE — SUBJECTIVE & OBJECTIVE
Interval History:   Mr. Peoples remains critically ill and requires ongoing life support for septic shock, acute kidney injury, and acute respiratory failure.  He was seen/evaluated on ICU rounds on 3/15/2020.    Ventilator settings => On maximal vasopressors with norepinephrine, vasopressin, and phenylephrine.  Bicarbonate infusion ongoing, along with bolus dosing.  Stress steroids initiated; insulin infusion for glycemic control.    Intubation Date Vent Day PBW Mode Set Rate Pt Rate TV (ml/kg) FiO2 PEEP Ppeak Pplat   3/14 at 10:30 am 2 75.3 kg PRVC 18 18 500  (6.6) 100% 8 25      PEEP reduced from 10 to 8 given severe shock.  Off/on CRRT/SLED due to poor flows and hypotension.    ICU Best Practices => pantoprazole; heparin SQ    Minimal urine output since ICU admission.     Culture Results:  Gram negative rods in admission blood cultures.    Current antibiotics:  · Ciprofloxacin => 3/14 to present  · Meropenem => 3/14 to present  · Metronidazole => 3/14 to present    · RUQ ultrasound with likely cholecystitis changes but only intrahepatic dilatation.  Seems unlikely to benefit from percutaneous biliary drainage.  I doubt percutaneous cholecystostomy tube placement is warranted but may need to be considered for maximal source control.      Objective:     Vital Signs (Most Recent):  Temp: (!) 94.6 °F (34.8 °C) (03/15/20 2300)  Pulse: 97 (03/15/20 2315)  Resp: (!) 28 (03/15/20 2315)  BP: 121/60 (03/15/20 2300)  SpO2: (!) 93 % (03/15/20 2315) Vital Signs (24h Range):  Temp:  [85.3 °F (29.6 °C)-97.2 °F (36.2 °C)] 94.6 °F (34.8 °C)  Pulse:  [] 97  Resp:  [22-33] 28  SpO2:  [77 %-98 %] 93 %  BP: ()/(44-89) 121/60  Arterial Line BP: ()/(42-72) 90/53     Weight: (104 kg)  Body mass index is 32.08 kg/m².      Intake/Output Summary (Last 24 hours) at 3/16/2020 0018  Last data filed at 3/15/2020 2300  Gross per 24 hour   Intake 7630.03 ml   Output 154.61 ml   Net 7475.42 ml       Physical Exam   HENT:    Orally intubated   Cardiovascular: Normal rate, regular rhythm and normal heart sounds. Exam reveals no gallop.   No murmur heard.  Pulmonary/Chest: No respiratory distress. He has no wheezes. He has no rales.   Abdominal: Soft. Bowel sounds are normal. He exhibits distension. There is no tenderness. There is no rebound.   Musculoskeletal: He exhibits edema.   Neurological:   On sedation.   Skin:   Mottled extremities   Nursing note and vitals reviewed.      Vents:  Vent Mode: PRVC (03/15/20 1940)  Ventilator Initiated: Yes (03/14/20 1042)  Set Rate: 22 BPM (03/15/20 1940)  Vt Set: 500 mL (03/15/20 1940)  PEEP/CPAP: 8 cmH20 (03/15/20 1940)  Oxygen Concentration (%): 100 (03/15/20 2315)  Peak Airway Pressure: 27.4 cmH2O (03/15/20 1940)  Total Ve: 16 mL (03/15/20 1940)  F/VT Ratio<105 (RSBI): (!) 60.67 (03/15/20 1940)    Lines/Drains/Airways     Central Venous Catheter Line            Percutaneous Central Line Insertion/Assessment - Triple Lumen  03/14/20 1100 right internal jugular 1 day    Trialysis (Dialysis) Catheter 03/14/20 2205 left internal jugular 1 day          Drain                 NG/OG Tube 03/14/20 1630 orogastric Center mouth 1 day          Airway                 Airway - Non-Surgical 03/14/20 1026 Endotracheal Tube 1 day          Arterial Line                 Arterial Line 03/15/20 0921 Left Radial less than 1 day          Peripheral Intravenous Line                 Peripheral IV - Single Lumen 03/14/20 0614 18 G Right Antecubital 1 day         Peripheral IV - Single Lumen 03/14/20 0707 18 G Right Hand 1 day         Peripheral IV - Single Lumen 03/15/20 0908 20 G Anterior;Left Hand less than 1 day                Significant Labs:    CBC/Anemia Profile:  Recent Labs   Lab 03/14/20  0635 03/15/20  0330 03/15/20  1600   WBC 26.89* 22.39* 18.67*   HGB 18.7* 17.0 14.6   HCT 56.6* 55.1* 47.1    111* 58*   MCV 91 100* 98   RDW 13.2 13.3 13.3        Chemistries:  Recent Labs   Lab 03/14/20  0632   03/15/20  0450 03/15/20  0922 03/15/20  1501 03/15/20  2200   *   < > 132*  --  135*  135* 135*   K 4.9   < > 5.5*  --  3.9  3.9 4.5   CL 99   < > 102  --  100  100 102   CO2 14*   < > 7*  --  6*  6* 7*   BUN 31*   < > 32*  --  27*  27* 22   CREATININE 2.2*   < > 3.4*  --  3.3*  3.3* 3.0*   CALCIUM 6.9*   < > 6.5*  --  5.9*  5.9* 5.7*   ALBUMIN 3.3*  --  2.3*  --  2.4*  2.4* 1.8*   PROT 6.1  --  5.7*  --  4.7*  --    BILITOT 9.7*  --  2.8*  --  4.1*  --    ALKPHOS 218*  --  258*  --  307*  --    *  --  431*  --  1,467*  --    *  --  363*  --  1,377*  --    MG  --    < > 2.2 2.3 2.1  2.1  --    PHOS  --   --  6.3*  --  5.4*  5.4* 5.8*    < > = values in this interval not displayed.     Results for ASHLIE ABEL JR. (MRN [id number])    Ref. Range 3/15/2020 09:22 3/15/2020 15:01   Lactate, Alonso Latest Ref Range: 0.5 - 2.2 mmol/L >12.0 (HH) >12.0 (HH)       Results for ASHLIE ABEL JR. (MRN [id number])    3/15/2020 04:13 3/15/2020 07:17 3/15/2020 14:57   POC pH 6.878 (LL) 6.957 (LL) 7.001 (LL)   POC PCO2 40.3 34.0 (L) 32.1 (L)   POC PO2 74 (L) 84 67 (L)   POC BE -26 -24 -23   POC HCO3 7.5 (L) 7.6 (L) 7.9 (L)   POC SAO2 80 (L) 88 (L) 81 (L)   POC TCO2 9 (L) 9 (L) 9 (L)   FiO2 100 100 100   Vt 500 500 500   PiP 21     PEEP 5 5 8   Mode AC/PRVC AC/PRVC AC/PRVC   Rate 18 22 22       All pertinent labs within the past 24 hours have been reviewed.    Significant Imaging:  I have reviewed all pertinent imaging results/findings within the past 24 hours.

## 2020-03-16 NOTE — ASSESSMENT & PLAN NOTE
Acute kidney injury in the setting of sepsis/septic shock typical for acute tubular necrosis.    · Given persistent acidosis and oligo/anuria, attempting CRRT/SLED as per Nephrology => goal is clearance rather than volume removal given hemodynamic status and severe metabolic (lactic) acidosis.  · Renally dose all medications as appropriate.  · Avoid nephrotoxins.

## 2020-03-16 NOTE — PROGRESS NOTES
Pt bradycardic on monitor. Periods of asystole/ slow v manju. Family on way. MD notified. Additional gram calcium infusing.

## 2020-03-16 NOTE — CARE UPDATE
[facility]  ICU Multidisciplinary Bedside Rounds   SUMMARY     Date: 3/16/2020    Prehospitalization: Home  Admit Date / LOS : 3/14/2020/ 2 days    Diagnosis: Sepsis with acute organ dysfunction and septic shock    Consults:        Active: GI, Nephro and Pulm CC       Needed: Palliative     Code Status: DNR   Advanced Directive: <no information>    LDA: Art Line, Dwayne Hugger, Aguirre, OG, PIV, TLC, Trialysis and Vent       Central Lines/Site/Justification:Pressors       Urinary Cath/Order/Justification:Critically Ill in the ICU and requiring intensive monitoring    Vasopressors/Infusions:    amiodarone in dextrose 5% 0.5 mg/min (03/16/20 0500)    fentanyl      insulin (HUMAN R) infusion (adults) 1.8 Units/hr (03/16/20 0500)    norepinephrine bitartrate-D5W 3.001 mcg/kg/min (03/16/20 0500)    phenylephrine 5 mcg/kg/min (03/16/20 0500)    propofoL Stopped (03/15/20 1045)    sodium bicarbonate drip 150 mL/hr at 03/16/20 0500    vasopressin (PITRESSIN) infusion 0.04 Units/min (03/16/20 0500)          GOALS: Volume/ Hemodynamic: MAP > 65                   RASS: -1  awakes to voice (eye opening/contact) > 10 seconds    CAM ICU: N/A  Pain Management: IV       Pain Controlled: yes     Rhythm: A-Fib    Respiratory Device: Vent    Vent Mode: PRVC  Oxygen Concentration (%):  [100] 100  Resp Rate Total:  [22 br/min-32 br/min] 25 br/min  Vt Set:  [500 mL] 500 mL  PEEP/CPAP:  [5 cmH20-10 cmH20] 8 cmH20  Mean Airway Pressure:  [10.9 gdP46-32.8 cmH20] 15.7 cmH20             Most Recent SBT/ SAT: Does not meet criteria       MOVE Screen: FAIL    VTE Prophylaxis: Pharm  Mobility: Bedrest  Stress Ulcer Prophylaxis: Yes    Dietary: NPO  Tolerance: not applicable  /  Advancement: no    Isolation: No active isolations    Restraints: No    Significant Dates:  Post Op Date: 3/14  Rescue Date: N/A  Imaging/ Diagnostics: N/A    Noteworthy Labs:  See below    CBC/Anemia Labs: Coags:    Recent Labs   Lab 03/15/20  1270  03/15/20  1600   WBC 22.39* 18.67*   HGB 17.0 14.6   HCT 55.1* 47.1   * 58*   * 98   RDW 13.3 13.3    Recent Labs   Lab 03/14/20  1500 03/15/20  0330   INR 1.1 1.3*   APTT  --  42.9*        Chemistries:   Recent Labs   Lab 03/15/20  0450 03/15/20  0922 03/15/20  1501 03/15/20  2200   *  --  135*  135* 135*   K 5.5*  --  3.9  3.9 4.5     --  100  100 102   CO2 7*  --  6*  6* 7*   BUN 32*  --  27*  27* 22   CREATININE 3.4*  --  3.3*  3.3* 3.0*   CALCIUM 6.5*  --  5.9*  5.9* 5.7*   PROT 5.7*  --  4.7*  --    BILITOT 2.8*  --  4.1*  --    ALKPHOS 258*  --  307*  --    *  --  1,467*  --    *  --  1,377*  --    MG 2.2 2.3 2.1  2.1  --    PHOS 6.3*  --  5.4*  5.4* 5.8*        Cardiac Enzymes: Ejection Fractions:    Recent Labs     03/14/20  0635 03/14/20  1833   TROPONINI 0.021 0.142*    No results found for: EF     POCT Glucose: HbA1c:    Recent Labs   Lab 03/16/20  0310 03/16/20  0408 03/16/20  0507   POCTGLUCOSE 152* 139* 169*    Hemoglobin A1C   Date Value Ref Range Status   03/14/2020 6.9 (H) 4.0 - 5.6 % Final     Comment:     ADA Screening Guidelines:  5.7-6.4%  Consistent with prediabetes  >or=6.5%  Consistent with diabetes  High levels of fetal hemoglobin interfere with the HbA1C  assay. Heterozygous hemoglobin variants (HbS, HgC, etc)do  not significantly interfere with this assay.   However, presence of multiple variants may affect accuracy.          Needs from Care Team: Palliative care discussion?   ICU LOS 1d 15h  Level of Care: Critical Care

## 2020-03-16 NOTE — ASSESSMENT & PLAN NOTE
Worsening oxygenation overnight and now requiring FiO2 100% to achieve marginal oxygenation.  Likely consistent with   ARDS +/- cardiogenic edema given severity of illness.    · Lung protective ventilation given high risk for ARDS.  · ICU Best Practices to reduce the risk of complications of mechanical ventilation.  · Daily awakening/breathing trials as tolerated.  · Duo-nebs while intubated given asthma history => Symbicort once extubated.

## 2020-03-16 NOTE — PROGRESS NOTES
[facility]  Pulmonology  Progress Note    Patient Name: Bonifacio Peoples Jr.  MRN: [id number]  Admission Date: 3/14/2020  Hospital Length of Stay: 2 days  Code Status: DNR  Attending Provider: John Rider MD  Primary Care Provider: Justine Win MD   Principal Problem: Sepsis with acute organ dysfunction and septic shock    Subjective:     Interval History:   Mr. Peoples remains critically ill and requires ongoing life support for septic shock, acute kidney injury, and acute respiratory failure.  He was seen/evaluated on ICU rounds on 3/15/2020.    Ventilator settings => On maximal vasopressors with norepinephrine, vasopressin, and phenylephrine.  Bicarbonate infusion ongoing, along with bolus dosing.  Stress steroids initiated; insulin infusion for glycemic control.    Intubation Date Vent Day PBW Mode Set Rate Pt Rate TV (ml/kg) FiO2 PEEP Ppeak Pplat   3/14 at 10:30 am 2 75.3 kg PRVC 18 18 500  (6.6) 100% 8 25      PEEP reduced from 10 to 8 given severe shock.  Off/on CRRT/SLED due to poor flows and hypotension.    ICU Best Practices => pantoprazole; heparin SQ    Minimal urine output since ICU admission.     Culture Results:  Gram negative rods in admission blood cultures.    Current antibiotics:  · Ciprofloxacin => 3/14 to present  · Meropenem => 3/14 to present  · Metronidazole => 3/14 to present    · RUQ ultrasound with likely cholecystitis changes but only intrahepatic dilatation.  Seems unlikely to benefit from percutaneous biliary drainage.  I doubt percutaneous cholecystostomy tube placement is warranted but may need to be considered for maximal source control.      Objective:     Vital Signs (Most Recent):  Temp: (!) 94.6 °F (34.8 °C) (03/15/20 2300)  Pulse: 97 (03/15/20 2315)  Resp: (!) 28 (03/15/20 2315)  BP: 121/60 (03/15/20 2300)  SpO2: (!) 93 % (03/15/20 2315) Vital Signs (24h Range):  Temp:  [85.3 °F (29.6 °C)-97.2 °F (36.2 °C)] 94.6 °F (34.8 °C)  Pulse:  [] 97  Resp:   [22-33] 28  SpO2:  [77 %-98 %] 93 %  BP: ()/(44-89) 121/60  Arterial Line BP: ()/(42-72) 90/53     Weight: (104 kg)  Body mass index is 32.08 kg/m².      Intake/Output Summary (Last 24 hours) at 3/16/2020 0018  Last data filed at 3/15/2020 2300  Gross per 24 hour   Intake 7630.03 ml   Output 154.61 ml   Net 7475.42 ml       Physical Exam   HENT:   Orally intubated   Cardiovascular: Normal rate, regular rhythm and normal heart sounds. Exam reveals no gallop.   No murmur heard.  Pulmonary/Chest: No respiratory distress. He has no wheezes. He has no rales.   Abdominal: Soft. Bowel sounds are normal. He exhibits distension. There is no tenderness. There is no rebound.   Musculoskeletal: He exhibits edema.   Neurological:   On sedation.   Skin:   Mottled extremities   Nursing note and vitals reviewed.      Vents:  Vent Mode: PRVC (03/15/20 1940)  Ventilator Initiated: Yes (03/14/20 1042)  Set Rate: 22 BPM (03/15/20 1940)  Vt Set: 500 mL (03/15/20 1940)  PEEP/CPAP: 8 cmH20 (03/15/20 1940)  Oxygen Concentration (%): 100 (03/15/20 2315)  Peak Airway Pressure: 27.4 cmH2O (03/15/20 1940)  Total Ve: 16 mL (03/15/20 1940)  F/VT Ratio<105 (RSBI): (!) 60.67 (03/15/20 1940)    Lines/Drains/Airways     Central Venous Catheter Line            Percutaneous Central Line Insertion/Assessment - Triple Lumen  03/14/20 1100 right internal jugular 1 day    Trialysis (Dialysis) Catheter 03/14/20 2205 left internal jugular 1 day          Drain                 NG/OG Tube 03/14/20 1630 orogastric Center mouth 1 day          Airway                 Airway - Non-Surgical 03/14/20 1026 Endotracheal Tube 1 day          Arterial Line                 Arterial Line 03/15/20 0921 Left Radial less than 1 day          Peripheral Intravenous Line                 Peripheral IV - Single Lumen 03/14/20 0614 18 G Right Antecubital 1 day         Peripheral IV - Single Lumen 03/14/20 0707 18 G Right Hand 1 day         Peripheral IV - Single Lumen  03/15/20 0908 20 G Anterior;Left Hand less than 1 day                Significant Labs:    CBC/Anemia Profile:  Recent Labs   Lab 03/14/20  0635 03/15/20  0330 03/15/20  1600   WBC 26.89* 22.39* 18.67*   HGB 18.7* 17.0 14.6   HCT 56.6* 55.1* 47.1    111* 58*   MCV 91 100* 98   RDW 13.2 13.3 13.3        Chemistries:  Recent Labs   Lab 03/14/20  0635  03/15/20  0450 03/15/20  0922 03/15/20  1501 03/15/20  2200   *   < > 132*  --  135*  135* 135*   K 4.9   < > 5.5*  --  3.9  3.9 4.5   CL 99   < > 102  --  100  100 102   CO2 14*   < > 7*  --  6*  6* 7*   BUN 31*   < > 32*  --  27*  27* 22   CREATININE 2.2*   < > 3.4*  --  3.3*  3.3* 3.0*   CALCIUM 6.9*   < > 6.5*  --  5.9*  5.9* 5.7*   ALBUMIN 3.3*  --  2.3*  --  2.4*  2.4* 1.8*   PROT 6.1  --  5.7*  --  4.7*  --    BILITOT 9.7*  --  2.8*  --  4.1*  --    ALKPHOS 218*  --  258*  --  307*  --    *  --  431*  --  1,467*  --    *  --  363*  --  1,377*  --    MG  --    < > 2.2 2.3 2.1  2.1  --    PHOS  --   --  6.3*  --  5.4*  5.4* 5.8*    < > = values in this interval not displayed.     Results for ASHLIE ABEL JR. (MRN [id number])    Ref. Range 3/15/2020 09:22 3/15/2020 15:01   Lactate, Alonso Latest Ref Range: 0.5 - 2.2 mmol/L >12.0 (HH) >12.0 (HH)       Results for ASHLIE ABEL JR. (MRN [id number])    3/15/2020 04:13 3/15/2020 07:17 3/15/2020 14:57   POC pH 6.878 (LL) 6.957 (LL) 7.001 (LL)   POC PCO2 40.3 34.0 (L) 32.1 (L)   POC PO2 74 (L) 84 67 (L)   POC BE -26 -24 -23   POC HCO3 7.5 (L) 7.6 (L) 7.9 (L)   POC SAO2 80 (L) 88 (L) 81 (L)   POC TCO2 9 (L) 9 (L) 9 (L)   FiO2 100 100 100   Vt 500 500 500   PiP 21     PEEP 5 5 8   Mode AC/PRVC AC/PRVC AC/PRVC   Rate 18 22 22       All pertinent labs within the past 24 hours have been reviewed.    Significant Imaging:  I have reviewed all pertinent imaging results/findings within the past 24 hours.    Assessment/Plan:     * Sepsis with acute organ dysfunction and septic shock  Consistent with  gram negative septic shock most likely from gall bladder source.  Significant lactic acidosis with vasopressor requirements worsening over the last 24 hours.  RUQ ultrasound without significantly dilated ducts to warrant intervention; gall bladder changes may suggest cholecystitis.    · Received adequate fluid resuscitation, antibiotics, but may not have achieved source control despite timely ERCP.  · Titrate vasopressors as appropriate.  · Stress steroids started due to refractory shock.  · Continue current antibiotics without evidence of fungemia.  · Consider gall bladder drainage if we can achieve acceptable stability for transport to IR.    Palliative care encounter  Multiple discussions with family (wife and others) regarding the deterioration over the last 24 hours.  We will continue current aggressive treatment measures.  However, in the event of cardiac arrest while on maximal doses of multiple vasopressors, CPR with chest compressions is futile.  I also informed the family that Mr. Peoples has been unable to tolerate CRRT/SLED due to hemodynamic instability.  I reiterated my concern that he was unlikely to survive through the night given his severity of illness.    · Continue current modes of aggressive life support, but no CPR/chest compressions in the event of cardiac arrest.  · Do Not Resuscitate order entered.    Acute hypoxemic respiratory failure  Intubated 3/14 due to septic shock with severe lactic acidosis prior to ERCP procedure.    Worsening oxygenation overnight and now requiring FiO2 100% to achieve marginal oxygenation.  Likely consistent with   ARDS +/- cardiogenic edema given severity of illness.    · Lung protective ventilation given high risk for ARDS.  · ICU Best Practices to reduce the risk of complications of mechanical ventilation.  · Daily awakening/breathing trials as tolerated.  · Duo-nebs while intubated given asthma history => Symbicort once extubated.    Acute renal failure  superimposed on stage 3 chronic kidney disease  Acute kidney injury in the setting of sepsis/septic shock typical for acute tubular necrosis.    · Given persistent acidosis and oligo/anuria, attempting CRRT/SLED as per Nephrology => goal is clearance rather than volume removal given hemodynamic status and severe metabolic (lactic) acidosis.  · Renally dose all medications as appropriate.  · Avoid nephrotoxins.    Ascending cholangitis  Most likely source for sepsis (gram negative franco bacteremia).    · S/P ERCP with sphincterotomy on 3/14/2020  · On appropriate antibiotics.  · High risk of pancreatitis post-ERCP, so will try to limit propofol dose as tolerated.  · RUQ ultrasound today suggests biliary drainage has been adequate; consider gall bladder decompression/drainage if stable enough to travel to .    Type 2 diabetes mellitus with diabetic neuropathy, without long-term current use of insulin  Uncontrolled hyperglycemia in face of sepsis and stress steroids.    · Hold metformin.  · Titrate iv insulin infusion to achieve glucose <200.      Time spent providing bedside critical care for life-threatening illness (not including procedure time) throughout the day on 3/15/2020 = 60 minutes.  Time also included discussion and clarification of goals of care in the setting of refractory septic shock and worsening organ dysfunction.       Mumtaz Ascencio MD  Pulmonology  [facility]

## 2020-03-16 NOTE — NURSING
Dr. Ascencio at bedside. Patient blood pressure dropping, removing 0 UF from patient, increasing vasopressors. MD states to discontinue CRRT at this time and is discussing goals of care and plan with patients fmaily

## 2020-03-16 NOTE — PROGRESS NOTES
Patient with progressively worsening shock despite vasopressor support with three vasopressors (vasopressin, phenylephrine, and norepinephrine).  Patient seen and examined this morning and had no pulse.  Patient declared dead at 8:08 am on 3/16/2020.  Preliminary cause of death is septic shock secondary ascending cholangitis with gram negative bacteremia.  Family at the bedside when patient removed off the ventilatory.

## 2020-03-16 NOTE — ASSESSMENT & PLAN NOTE
Multiple discussions with family (wife and others) regarding the deterioration over the last 24 hours.  We will continue current aggressive treatment measures.  However, in the event of cardiac arrest while on maximal doses of multiple vasopressors, CPR with chest compressions is futile.  I also informed the family that Mr. Peoples has been unable to tolerate CRRT/SLED due to hemodynamic instability.  I reiterated my concern that he was unlikely to survive through the night given his severity of illness.    · Continue current modes of aggressive life support, but no CPR/chest compressions in the event of cardiac arrest.  · Do Not Resuscitate order entered.

## 2020-03-16 NOTE — PROGRESS NOTES
"Pharmacokinetic Initial Assessment: Amikacin    Assessment:  Weight utilized for dose calculation: Adjusted Body Weight  Dosing method utilized: conventional dosing (not a candidate for extended interval due to severe renal impairment (CrCl <30 mL/min))    Plan: Traditional dosing regimen: Amikacin 650 mg IV once, followed by a serum peak level to be drawn on 3/16/20 at 05:30 30 min after the end of the first dose for calculation of Vd (if necessary). Goal Peak level is 25-30 mcg/mL. Trough Monitoring to follow.    Pharmacy will continue to monitor.    Please contact pharmacy at extension [phone] with any questions regarding this assessment.    Thank you for the consult,    Karo Stuart       Patient brief summary:  Bonifacio Peoples Jr. is a 69 y.o. male initiated on aminoglycoside therapy for treatment of suspected intra-abdominal infection    Drug Allergies:   Review of patient's allergies indicates:   Allergen Reactions    Codeine      Childhood allergy, patient can't remember what happened.    Pcn [penicillins] Nausea And Vomiting    Zyrtec [cetirizine]      disorientated      Contrast media Rash     Pts wife states he is allergic to IV contrast "dye"    Iodine and iodide containing products Rash     IV dye contrast       Actual Body Weight:   104.3 kg    Adjust Body Weight:   86.9 kg    Ideal Body Weight:  75.3 kg    Renal Function:   Estimated Creatinine Clearance: 29.4 mL/min (A) (based on SCr of 3 mg/dL (H)).,     Dialysis Method (if applicable):  CRRT    CBC (last 72 hours):  Recent Labs   Lab Result Units 03/14/20  0635 03/15/20  0330 03/15/20  1600   WBC K/uL 26.89* 22.39* 18.67*   Hemoglobin g/dL 18.7* 17.0 14.6   Hemoglobin A1C % 6.9*  --   --    Hematocrit % 56.6* 55.1* 47.1   Platelets K/uL 171 111* 58*   Gran% % 87.5* 85.3* 83.9*   Lymph% % 3.2* 6.7* 5.1*   Mono% % 7.1 6.1 5.9   Eosinophil% % 0.5 0.1 3.2   Basophil% % 0.3 0.8 0.6   Differential Method  Automated Automated Automated       Metabolic Panel " (last 72 hours):  Recent Labs   Lab Result Units 03/14/20  0635 03/14/20  0929 03/14/20  1833 03/15/20  0102 03/15/20  0450 03/15/20  0922 03/15/20  1501 03/15/20  2200   Sodium mmol/L 135*  --  132*  --  132*  --  135*  135* 135*   Potassium mmol/L 4.9  --  5.2*  --  5.5*  --  3.9  3.9 4.5   Chloride mmol/L 99  --  104  --  102  --  100  100 102   CO2 mmol/L 14*  --  13*  --  7*  --  6*  6* 7*   Glucose mg/dL 423*  --  383*  --  362*  --  260*  260* 164*   Glucose, UA   --  3+*  --   --   --   --   --   --    BUN, Bld mg/dL 31*  --  36*  --  32*  --  27*  27* 22   Creatinine mg/dL 2.2*  --  3.2*  --  3.4*  --  3.3*  3.3* 3.0*   Albumin g/dL 3.3*  --   --   --  2.3*  --  2.4*  2.4* 1.8*   Total Bilirubin mg/dL 9.7*  --   --   --  2.8*  --  4.1*  --    Alkaline Phosphatase U/L 218*  --   --   --  258*  --  307*  --    AST U/L 556*  --   --   --  363*  --  1,377*  --    ALT U/L 695*  --   --   --  431*  --  1,467*  --    Magnesium mg/dL  --   --   --  2.0 2.2 2.3 2.1  2.1  --    Phosphorus mg/dL  --   --   --   --  6.3*  --  5.4*  5.4* 5.8*       Microbiologic Results:  Microbiology Results (last 7 days)       Procedure Component Value Units Date/Time    Blood culture [[id number]] Collected:  03/14/20 0653    Order Status:  Completed Specimen:  Blood from Peripheral, Antecubital, Left Updated:  03/15/20 0903     Blood Culture, Routine No Growth to date      No Growth to date    Blood culture [[id number]]  (Abnormal) Collected:  03/14/20 0653    Order Status:  Completed Specimen:  Blood from Peripheral, Antecubital, Left Updated:  03/15/20 0857     Blood Culture, Routine Gram stain salvador bottle: Gram negative rods 03/14/2020  21:42       Results called to and read back by: Saige Ybarra      GRAM NEGATIVE ANJALI  Identification and susceptibility pending

## 2020-03-16 NOTE — RESPIRATORY THERAPY
[facility]   Vt increased to 550 per MD.   Results for ASHLIE ABEL JR. (MRN [id number]) as of 3/16/2020 04:33   Ref. Range 3/16/2020 02:24   POC PH Latest Ref Range: 7.35 - 7.45  7.141 (LL)   POC PCO2 Latest Ref Range: 35 - 45 mmHg 32.1 (L)   POC PO2 Latest Ref Range: 80 - 100 mmHg 63 (L)   POC BE Latest Ref Range: -2 to 2 mmol/L -17   POC HCO3 Latest Ref Range: 24 - 28 mmol/L 10.9 (L)   POC SATURATED O2 Latest Ref Range: 95 - 100 % 84 (L)   POC TCO2 Latest Ref Range: 23 - 27 mmol/L 12 (L)   FiO2 Unknown 100   Vt Unknown 500   PiP Unknown 28   PEEP Unknown 8   Sample Unknown ARTERIAL   DelSys Unknown Adult Vent   Allens Test Unknown N/A   Site Unknown Keith/UAC   Mode Unknown AC/PRVC   Rate Unknown 22   Min Vol Unknown 13.1   Sp02 Unknown 87

## 2020-03-16 NOTE — ASSESSMENT & PLAN NOTE
Most likely source for sepsis (gram negative franco bacteremia).    · S/P ERCP with sphincterotomy on 3/14/2020  · On appropriate antibiotics.  · High risk of pancreatitis post-ERCP, so will try to limit propofol dose as tolerated.  · RUQ ultrasound today suggests biliary drainage has been adequate; consider gall bladder decompression/drainage if stable enough to travel to IR.

## 2020-03-16 NOTE — PROGRESS NOTES
MAP < 65 despite being on multiple pressors.  PH low   - give 2 amps of bicarb push, continue the drip at 150   - add Amikacin IV   - check labs, electrolytes   - may add Albumin IV trial

## 2020-03-16 NOTE — EICU
Rounding (Video Assessment):  Doctor Notified: yes      Comments:  Pt nurse called to report pt Nohemy and b/p cuff is not correlating and she is not sure which one she should go by. Earlier in the day time Dr. Ascencio said ot go by Nohemy. She would like to confer with md. Informed Dr. Mckeon.

## 2020-03-16 NOTE — PLAN OF CARE
Pt remains in ICU on mechanical ventilation and CRRT. pH minimally improved overnight but patient still maxed on vasopressors. Bicarb gtt. Amio gtt. Insulin gtt. Bicarb push and Albumin given for persistent low blood pressure. Amikacin started. Arterial line and cuff MAP not correlating, reviewed and troubleshot with emily SMITH to use cuff pressure at this time. Wife called and updated on events overnight.

## 2020-03-16 NOTE — EICU
Rounding (Video Assessment):  No    Intervention Initiated From:  Bedside    Doctor Notified:  Yes    Comments: Nurse requesting critical consult for increasing need for pressor support.

## 2020-03-16 NOTE — ASSESSMENT & PLAN NOTE
Consistent with gram negative septic shock most likely from gall bladder source.  Significant lactic acidosis with vasopressor requirements worsening over the last 24 hours.  RUQ ultrasound without significantly dilated ducts to warrant intervention; gall bladder changes may suggest cholecystitis.    · Received adequate fluid resuscitation, antibiotics, but may not have achieved source control despite timely ERCP.  · Titrate vasopressors as appropriate.  · Stress steroids started due to refractory shock.  · Continue current antibiotics without evidence of fungemia.  · Consider gall bladder drainage if we can achieve acceptable stability for transport to IR.

## 2020-03-17 LAB
HBV SURFACE AB SER QL IA: NEGATIVE
HBV SURFACE AB SERPL IA-ACNC: <3 MIU/ML

## 2020-03-17 NOTE — DISCHARGE SUMMARY
[facility] Hospital Medicine  Discharge Summary      Patient Name: Bonifacio Peoples Jr.  MRN: [id number]  Admission Date: 3/14/2020  Hospital Length of Stay: 2 days  Discharge Date and Time: 3/16/2020 10:01 AM  Attending Physician: John Rider MD  Discharging Provider: John Rider MD  Primary Care Provider: Justine Win MD      HPI:   Patient 69-year-old man with hypertension, diabetes mellitus type 2, and chronic kidney disease stage 3 who presents emergency department with severe abdominal pain associated nausea and vomiting.  Patient reports 3 episodes of emesis.  Patient reports some shortness of breath this morning.  Patient states that he has not been able to keep anything down including his prescription medications for the last few days.  Patient reports social drinking but denies any alcohol abuse.  Denies tobacco use.    Procedure(s) (LRB):  ERCP (ENDOSCOPIC RETROGRADE CHOLANGIOPANCREATOGRAPHY) (N/A)      Hospital Course:   Patient 69-year-old man with hypertension, diabetes mellitus type 2, and chronic kidney disease stage 3 who presents with sepsis with lactic acidosis due to choledocholithiasis with ascending cholangitis with multiple stones seen in the common bile duct with intrahepatic biliary ductal dilatation.  Patient started on broad-spectrum antibiotics and underwent endoscopic retrograde cholangiopancreatography for source control on 3/14/2020.  Patient intubated prior to procedure and was left intubated due to evidence of worsening shock.  Patient started on vasopressor support.  Blood culture positive for gram negative franco.  Patient required increasing doses of vasopressor support and developed worsening acidosis.  Hemodialysis line placed on the left neck by general surgery on 3/14/2020 and continuous renal replacement therapy started.  Patient also developed atrial fibrillation with rapid ventricular response for which he was started on intravenous amiodarone.   Patient converted to normal sinus. Patient did not tolerate renal replacement therapy due to worsening hypotension and therefore dialysis was held on 3/15/2020.  Decision at that time was made to not subject patient to chest compressions in the event of cardiac arrest but to otherwise continue aggressive measures.  Patient's blood pressure improved and he was restarted on renal replacement therapy on the evening of 3/15/2020 however he continued to need higher doses of vasopressor support and therefore dialysis was discontinued again.  However had progressively worsening shock on three vasopressors and subsequently developed asystole.  Patient declared dead at 8:08 am on 3/16/2020.  Preliminary cause of death is septic shock secondary ascending cholangitis with gram negative bacteremia.  Family at the bedside at the time of death.     Consults:   Consults (From admission, onward)        Status Ordering Provider     Inpatient consult to Cardiology  Once     Provider:  Geoff Rincon MD    Completed ADELIA CUETO     Inpatient consult to Pulmonology  Once     Provider:  Mumtaz Ascencio MD    Completed HOMER GOULD          Final Active Diagnoses:    Diagnosis Date Noted POA    PRINCIPAL PROBLEM:  Sepsis with acute organ dysfunction and septic shock [A41.9, R65.21] 03/14/2020 Yes    Ascending cholangitis [K83.09] 03/14/2020 Yes    Choledocholithiasis [K80.50] 03/14/2020 Yes    Acute biliary pancreatitis [K85.10] 03/14/2020 Yes    Acute renal failure superimposed on stage 3 chronic kidney disease [N17.9, N18.3] 03/14/2020 Yes    Essential hypertension [I10] 10/02/2014 Yes    Palliative care encounter [Z51.5] 03/16/2020 Not Applicable    Acute hypoxemic respiratory failure [J96.01] 03/15/2020 No    Obesity (BMI 30.0-34.9) [E66.9] 08/07/2018 Yes    Type 2 diabetes mellitus with diabetic neuropathy, without long-term current use of insulin [E11.40] 05/03/2017 Yes      Problems Resolved During this  Admission:       Discharged Condition:     Disposition:     Indwelling Lines/Drains at time of discharge:   Lines/Drains/Airways     Central Venous Catheter Line            Percutaneous Central Line Insertion/Assessment - Triple Lumen  20 1100 right internal jugular 2 days    Trialysis (Dialysis) Catheter 20 2205 left internal jugular 1 day          Drain                 NG/OG Tube 20 1630 orogastric Center mouth 2 days          Airway                 Airway - Non-Surgical 20 1026 Endotracheal Tube 2 days          Arterial Line                 Arterial Line 03/15/20 0921 Left Radial 1 day                John Rider MD  Department of Hospital Medicine  [facility]

## 2020-03-19 LAB — BACTERIA BLD CULT: NORMAL

## 2021-06-15 NOTE — ASSESSMENT & PLAN NOTE
Uncontrolled hyperglycemia in face of sepsis and stress steroids.    · Hold metformin.  · Titrate iv insulin infusion to achieve glucose <200.   mouth/pharynx detailed exam details…